# Patient Record
Sex: FEMALE | Race: WHITE | Employment: UNEMPLOYED | URBAN - NONMETROPOLITAN AREA
[De-identification: names, ages, dates, MRNs, and addresses within clinical notes are randomized per-mention and may not be internally consistent; named-entity substitution may affect disease eponyms.]

---

## 2017-07-24 ENCOUNTER — OFFICE VISIT (OUTPATIENT)
Dept: OPTOMETRY | Age: 72
End: 2017-07-24
Payer: MEDICARE

## 2017-07-24 DIAGNOSIS — H52.4 MYOPIA OF BOTH EYES WITH ASTIGMATISM AND PRESBYOPIA: ICD-10-CM

## 2017-07-24 DIAGNOSIS — H52.203 MYOPIA OF BOTH EYES WITH ASTIGMATISM AND PRESBYOPIA: ICD-10-CM

## 2017-07-24 DIAGNOSIS — H53.8 BLURRED VISION, BILATERAL: Primary | ICD-10-CM

## 2017-07-24 DIAGNOSIS — H52.13 MYOPIA OF BOTH EYES WITH ASTIGMATISM AND PRESBYOPIA: ICD-10-CM

## 2017-07-24 DIAGNOSIS — H26.9 CATARACT: ICD-10-CM

## 2017-07-24 PROCEDURE — 1123F ACP DISCUSS/DSCN MKR DOCD: CPT | Performed by: OPTOMETRIST

## 2017-07-24 PROCEDURE — 1090F PRES/ABSN URINE INCON ASSESS: CPT | Performed by: OPTOMETRIST

## 2017-07-24 PROCEDURE — 3014F SCREEN MAMMO DOC REV: CPT | Performed by: OPTOMETRIST

## 2017-07-24 PROCEDURE — 1036F TOBACCO NON-USER: CPT | Performed by: OPTOMETRIST

## 2017-07-24 PROCEDURE — G8421 BMI NOT CALCULATED: HCPCS | Performed by: OPTOMETRIST

## 2017-07-24 PROCEDURE — 3017F COLORECTAL CA SCREEN DOC REV: CPT | Performed by: OPTOMETRIST

## 2017-07-24 PROCEDURE — 99203 OFFICE O/P NEW LOW 30 MIN: CPT | Performed by: OPTOMETRIST

## 2017-07-24 PROCEDURE — G8400 PT W/DXA NO RESULTS DOC: HCPCS | Performed by: OPTOMETRIST

## 2017-07-24 PROCEDURE — 4040F PNEUMOC VAC/ADMIN/RCVD: CPT | Performed by: OPTOMETRIST

## 2017-07-24 PROCEDURE — G8427 DOCREV CUR MEDS BY ELIG CLIN: HCPCS | Performed by: OPTOMETRIST

## 2017-07-24 RX ORDER — BENOXINATE HCL/FLUORESCEIN SOD 0.4%-0.25%
1 DROPS OPHTHALMIC (EYE) ONCE
Status: COMPLETED | OUTPATIENT
Start: 2017-07-24 | End: 2017-07-24

## 2017-07-24 RX ADMIN — Medication 1 DROP: at 15:12

## 2017-07-24 ASSESSMENT — VISUAL ACUITY
OD_SC: 20/20 OU
CORRECTION_TYPE: GLASSES
OS_CC: 20/25
METHOD: SNELLEN - LINEAR

## 2017-07-24 ASSESSMENT — REFRACTION_WEARINGRX
OS_CYLINDER: -0.25
SPECS_TYPE: SVL
OD_SPHERE: -3.50
OS_AXIS: 123
OD_AXIS: 125
OD_CYLINDER: -0.75
OS_SPHERE: -3.50

## 2017-07-24 ASSESSMENT — REFRACTION_MANIFEST
OD_SPHERE: -3.50
OD_CYLINDER: -1.00
OS_CYLINDER: -0.50
OS_AXIS: 175
OS_SPHERE: -3.50
OD_AXIS: 125

## 2017-07-24 ASSESSMENT — SLIT LAMP EXAM - LIDS
COMMENTS: NORMAL
COMMENTS: NORMAL

## 2017-07-24 ASSESSMENT — TONOMETRY
OS_IOP_MMHG: 16
OD_IOP_MMHG: 16
IOP_METHOD: APPLANATION W FLURESS DROP

## 2017-08-04 ENCOUNTER — HOSPITAL ENCOUNTER (OUTPATIENT)
Age: 72
Setting detail: SPECIMEN
Discharge: HOME | End: 2017-08-04
Payer: MEDICARE

## 2017-08-04 ENCOUNTER — OFFICE VISIT (OUTPATIENT)
Dept: OBGYN | Age: 72
End: 2017-08-04
Payer: MEDICARE

## 2017-08-04 VITALS
HEIGHT: 63 IN | DIASTOLIC BLOOD PRESSURE: 80 MMHG | BODY MASS INDEX: 34.91 KG/M2 | WEIGHT: 197 LBS | RESPIRATION RATE: 16 BRPM | HEART RATE: 80 BPM | SYSTOLIC BLOOD PRESSURE: 118 MMHG

## 2017-08-04 DIAGNOSIS — N81.6 RECTOCELE: ICD-10-CM

## 2017-08-04 DIAGNOSIS — Z01.419 WELL WOMAN EXAM: ICD-10-CM

## 2017-08-04 DIAGNOSIS — N81.10 PELVIC ORGAN PROLAPSE QUANTIFICATION STAGE 3 CYSTOCELE: ICD-10-CM

## 2017-08-04 DIAGNOSIS — R35.0 URINARY FREQUENCY: Primary | ICD-10-CM

## 2017-08-04 LAB
-: ABNORMAL
AMORPHOUS: ABNORMAL
BACTERIA: ABNORMAL
BILIRUBIN URINE: NEGATIVE
CASTS UA: ABNORMAL /LPF (ref 0–2)
COLOR: ABNORMAL
COMMENT UA: ABNORMAL
CRYSTALS, UA: ABNORMAL /HPF
EPITHELIAL CELLS UA: ABNORMAL /HPF (ref 0–5)
GLUCOSE URINE: NEGATIVE
KETONES, URINE: NEGATIVE
LEUKOCYTE ESTERASE, URINE: ABNORMAL
MUCUS: ABNORMAL
NITRITE, URINE: NEGATIVE
OTHER OBSERVATIONS UA: ABNORMAL
PH UA: 5.5 (ref 5–6)
PROTEIN UA: NEGATIVE
RBC UA: ABNORMAL /HPF (ref 0–4)
RENAL EPITHELIAL, UA: ABNORMAL /HPF
SPECIFIC GRAVITY UA: 1.02 (ref 1.01–1.02)
TRICHOMONAS: ABNORMAL
TURBIDITY: ABNORMAL
URINE HGB: ABNORMAL
UROBILINOGEN, URINE: NORMAL
WBC UA: ABNORMAL /HPF (ref 0–4)
YEAST: ABNORMAL

## 2017-08-04 PROCEDURE — G8427 DOCREV CUR MEDS BY ELIG CLIN: HCPCS | Performed by: OBSTETRICS & GYNECOLOGY

## 2017-08-04 PROCEDURE — 81001 URINALYSIS AUTO W/SCOPE: CPT | Performed by: OBSTETRICS & GYNECOLOGY

## 2017-08-04 PROCEDURE — 3014F SCREEN MAMMO DOC REV: CPT | Performed by: OBSTETRICS & GYNECOLOGY

## 2017-08-04 PROCEDURE — 99203 OFFICE O/P NEW LOW 30 MIN: CPT | Performed by: OBSTETRICS & GYNECOLOGY

## 2017-08-04 PROCEDURE — 1090F PRES/ABSN URINE INCON ASSESS: CPT | Performed by: OBSTETRICS & GYNECOLOGY

## 2017-08-04 PROCEDURE — 87624 HPV HI-RISK TYP POOLED RSLT: CPT | Performed by: OBSTETRICS & GYNECOLOGY

## 2017-08-04 PROCEDURE — 3017F COLORECTAL CA SCREEN DOC REV: CPT | Performed by: OBSTETRICS & GYNECOLOGY

## 2017-08-04 PROCEDURE — 4040F PNEUMOC VAC/ADMIN/RCVD: CPT | Performed by: OBSTETRICS & GYNECOLOGY

## 2017-08-04 PROCEDURE — G8400 PT W/DXA NO RESULTS DOC: HCPCS | Performed by: OBSTETRICS & GYNECOLOGY

## 2017-08-04 PROCEDURE — G8417 CALC BMI ABV UP PARAM F/U: HCPCS | Performed by: OBSTETRICS & GYNECOLOGY

## 2017-08-04 PROCEDURE — 1123F ACP DISCUSS/DSCN MKR DOCD: CPT | Performed by: OBSTETRICS & GYNECOLOGY

## 2017-08-04 PROCEDURE — 1036F TOBACCO NON-USER: CPT | Performed by: OBSTETRICS & GYNECOLOGY

## 2017-08-05 LAB
CULTURE: NORMAL
CULTURE: NORMAL
Lab: NORMAL
SPECIMEN DESCRIPTION: NORMAL
STATUS: NORMAL

## 2017-08-07 LAB — CYTOLOGY REPORT: NORMAL

## 2017-08-08 DIAGNOSIS — Z01.419 ROUTINE GYNECOLOGICAL EXAMINATION: Primary | ICD-10-CM

## 2017-08-08 PROCEDURE — 87624 HPV HI-RISK TYP POOLED RSLT: CPT | Performed by: OBSTETRICS & GYNECOLOGY

## 2017-08-09 LAB
HPV SAMPLE: NORMAL
HPV SOURCE: NORMAL
HPV, GENOTYPE 16: NOT DETECTED
HPV, GENOTYPE 18: NOT DETECTED
HPV, HIGH RISK OTHER: NOT DETECTED
HPV, INTERPRETATION: NORMAL

## 2017-10-18 ENCOUNTER — HOSPITAL ENCOUNTER (OUTPATIENT)
Dept: SLEEP CENTER | Age: 72
Discharge: HOME | End: 2017-10-18
Payer: MEDICARE

## 2017-10-18 PROCEDURE — G0399 HOME SLEEP TEST/TYPE 3 PORTA: HCPCS

## 2017-10-27 NOTE — PROGRESS NOTES
Michelle [address]                [address] [place] [address]                                 SLEEP STUDY    PATIENT NAME: Migel Jj                       :             1945  MED REC NO:   [id number]                              ROOM:  ACCOUNT NO:   [de-identified]                            ADMISSION DATE:  10/18/2017  PROVIDER:     Bogdan Peres    SLEEP IMPROVEMENT [place]  INTERPRETATION OF CLINICAL POLYSOMNOGRAPHY    DATE OF STUDY:  10/18/2017    REFERRING PROVIDER:  Dr. Magui Salvador. CLINICAL IMPRESSION:  1. Obstructive sleep apnea syndrome. 2.  Diabetes. PROCEDURE STANDARD:  1-night ApneaLink home sleep study. PROCEDURE:  The sleep/wake evaluation consisted of an intensive intake  interview, 1 night of home sleep testing. The standard montage for Home Sleep Testing included the [address]. The respiratory battery consisted of measurements of  simultaneous recording of ventilation (oronasal thermal airflow),  respiratory effort (single thoracoabdominal piezo belt), ECG or heart  rate, oxygen saturation (finger oximetry). The sleep study was started at 11:30 p.m. and was finished at 6:35  a.m. The total time duration spent in bed was 7 hours and 28 minutes. Total evaluation was 7 hours and 16 minutes. The respiratory evaluation revealed apnea-hypopnea index of 2. The  lowest oxygen saturation was 84%. IMPRESSION:  Sleep study did not reveal any significant sleep apnea. A clinical correlation should be obtained. If the patient has  pathological snoring, an ENT evaluation may be requested for the  treatment of snoring. The patient is overweight. She should be encouraged to lose weight. If the patient's history is highly suggestive of sleep apnea, then  in-lab observed study should be ordered for further evaluation of the  sleep apnea.         Alejandro Palacios    D: 10/26/2017 14:11:43       T: 10/26/2017 16:09:30 [email or web address]  Job#: [id number]     Doc#: [id number]

## 2018-05-04 ENCOUNTER — OFFICE VISIT (OUTPATIENT)
Dept: PRIMARY CARE CLINIC | Age: 73
End: 2018-05-04

## 2018-05-04 VITALS
BODY MASS INDEX: 35.44 KG/M2 | HEART RATE: 70 BPM | SYSTOLIC BLOOD PRESSURE: 128 MMHG | TEMPERATURE: 97.6 F | DIASTOLIC BLOOD PRESSURE: 74 MMHG | OXYGEN SATURATION: 98 % | WEIGHT: 200 LBS | HEIGHT: 63 IN

## 2018-05-04 DIAGNOSIS — S81.831A PUNCTURE WOUND OF RIGHT LOWER LEG, INITIAL ENCOUNTER: ICD-10-CM

## 2018-05-04 DIAGNOSIS — L03.115 CELLULITIS OF RIGHT ANTERIOR LOWER LEG: Primary | ICD-10-CM

## 2018-05-04 PROCEDURE — 90714 TD VACC NO PRESV 7 YRS+ IM: CPT | Performed by: NURSE PRACTITIONER

## 2018-05-04 PROCEDURE — 99202 OFFICE O/P NEW SF 15 MIN: CPT | Performed by: NURSE PRACTITIONER

## 2018-05-04 PROCEDURE — 90471 IMMUNIZATION ADMIN: CPT | Performed by: NURSE PRACTITIONER

## 2018-05-04 RX ORDER — CEPHALEXIN 500 MG/1
500 CAPSULE ORAL 3 TIMES DAILY
Qty: 30 CAPSULE | Refills: 0 | Status: SHIPPED | OUTPATIENT
Start: 2018-05-04 | End: 2018-11-28

## 2018-05-04 ASSESSMENT — ENCOUNTER SYMPTOMS: RESPIRATORY NEGATIVE: 1

## 2018-09-24 ENCOUNTER — APPOINTMENT (OUTPATIENT)
Dept: GENERAL RADIOLOGY | Age: 73
End: 2018-09-24
Payer: MEDICARE

## 2018-09-24 ENCOUNTER — APPOINTMENT (OUTPATIENT)
Dept: CT IMAGING | Age: 73
End: 2018-09-24
Payer: MEDICARE

## 2018-09-24 ENCOUNTER — HOSPITAL ENCOUNTER (EMERGENCY)
Age: 73
Discharge: HOME | End: 2018-09-24
Attending: SPECIALIST
Payer: MEDICARE

## 2018-09-24 VITALS
BODY MASS INDEX: 33.13 KG/M2 | OXYGEN SATURATION: 100 % | SYSTOLIC BLOOD PRESSURE: 138 MMHG | TEMPERATURE: 98 F | DIASTOLIC BLOOD PRESSURE: 76 MMHG | HEIGHT: 63 IN | RESPIRATION RATE: 14 BRPM | HEART RATE: 66 BPM | WEIGHT: 187 LBS

## 2018-09-24 DIAGNOSIS — R42 DIZZINESS: Primary | ICD-10-CM

## 2018-09-24 DIAGNOSIS — R20.2 PARESTHESIA: ICD-10-CM

## 2018-09-24 LAB
ABSOLUTE EOS #: 0 K/UL (ref 0–0.4)
ABSOLUTE IMMATURE GRANULOCYTE: NORMAL K/UL (ref 0–0.3)
ABSOLUTE LYMPH #: 1.6 K/UL (ref 1–4.8)
ABSOLUTE MONO #: 0.5 K/UL (ref 0.1–1.2)
ANION GAP SERPL CALCULATED.3IONS-SCNC: 12 MMOL/L (ref 9–17)
BASOPHILS # BLD: 1 % (ref 0–1)
BASOPHILS ABSOLUTE: 0 K/UL (ref 0–0.2)
BUN BLDV-MCNC: 15 MG/DL (ref 8–23)
BUN/CREAT BLD: 19 (ref 9–20)
CALCIUM SERPL-MCNC: 9.3 MG/DL (ref 8.6–10.4)
CHLORIDE BLD-SCNC: 100 MMOL/L (ref 98–107)
CO2: 26 MMOL/L (ref 20–31)
CREAT SERPL-MCNC: 0.78 MG/DL (ref 0.5–0.9)
DIFFERENTIAL TYPE: NORMAL
EKG ATRIAL RATE: 61 BPM
EKG P AXIS: 32 DEGREES
EKG P-R INTERVAL: 182 MS
EKG Q-T INTERVAL: 432 MS
EKG QRS DURATION: 80 MS
EKG QTC CALCULATION (BAZETT): 434 MS
EKG R AXIS: 9 DEGREES
EKG T AXIS: 19 DEGREES
EKG VENTRICULAR RATE: 61 BPM
EOSINOPHILS RELATIVE PERCENT: 1 % (ref 1–7)
GFR AFRICAN AMERICAN: >60 ML/MIN
GFR NON-AFRICAN AMERICAN: >60 ML/MIN
GFR SERPL CREATININE-BSD FRML MDRD: NORMAL ML/MIN/{1.73_M2}
GFR SERPL CREATININE-BSD FRML MDRD: NORMAL ML/MIN/{1.73_M2}
GLUCOSE BLD-MCNC: 89 MG/DL (ref 70–99)
HCT VFR BLD CALC: 39.6 % (ref 36–46)
HEMOGLOBIN: 13.5 G/DL (ref 12–16)
IMMATURE GRANULOCYTES: NORMAL %
LYMPHOCYTES # BLD: 27 % (ref 16–46)
MAGNESIUM: 2.6 MG/DL (ref 1.6–2.6)
MCH RBC QN AUTO: 30.2 PG (ref 26–34)
MCHC RBC AUTO-ENTMCNC: 34 G/DL (ref 31–37)
MCV RBC AUTO: 88.8 FL (ref 80–100)
MONOCYTES # BLD: 9 % (ref 4–11)
NRBC AUTOMATED: NORMAL PER 100 WBC
PDW BLD-RTO: 14.1 % (ref 11–14.5)
PLATELET # BLD: 361 K/UL (ref 140–450)
PLATELET ESTIMATE: NORMAL
PMV BLD AUTO: 7.3 FL (ref 6–12)
POTASSIUM SERPL-SCNC: 4.2 MMOL/L (ref 3.7–5.3)
RBC # BLD: 4.46 M/UL (ref 4–5.2)
RBC # BLD: NORMAL 10*6/UL
SEG NEUTROPHILS: 62 % (ref 43–77)
SEGMENTED NEUTROPHILS ABSOLUTE COUNT: 3.6 K/UL (ref 1.8–7.7)
SODIUM BLD-SCNC: 138 MMOL/L (ref 135–144)
TROPONIN INTERP: NORMAL
TROPONIN T: <0.03 NG/ML
WBC # BLD: 5.7 K/UL (ref 3.5–11)
WBC # BLD: NORMAL 10*3/UL

## 2018-09-24 PROCEDURE — 70450 CT HEAD/BRAIN W/O DYE: CPT

## 2018-09-24 PROCEDURE — 99284 EMERGENCY DEPT VISIT MOD MDM: CPT

## 2018-09-24 PROCEDURE — 71046 X-RAY EXAM CHEST 2 VIEWS: CPT

## 2018-09-24 PROCEDURE — 36415 COLL VENOUS BLD VENIPUNCTURE: CPT

## 2018-09-24 PROCEDURE — 80048 BASIC METABOLIC PNL TOTAL CA: CPT

## 2018-09-24 PROCEDURE — 93005 ELECTROCARDIOGRAM TRACING: CPT

## 2018-09-24 PROCEDURE — 85025 COMPLETE CBC W/AUTO DIFF WBC: CPT

## 2018-09-24 PROCEDURE — 83735 ASSAY OF MAGNESIUM: CPT

## 2018-09-24 PROCEDURE — 84484 ASSAY OF TROPONIN QUANT: CPT

## 2018-09-24 NOTE — ED PROVIDER NOTES
Normal range of motion. Neck supple. Carotid bruit is not present. Cardiovascular: Normal rate, regular rhythm, normal heart sounds and intact distal pulses. No murmur heard. Pulmonary/Chest: Effort normal and breath sounds normal. No respiratory distress. Abdominal: Soft. Bowel sounds are normal. She exhibits no distension. There is no tenderness. Neurological: She is alert and oriented to person, place, and time. She has normal strength and normal reflexes. No cranial nerve deficit or sensory deficit. She displays a negative Romberg sign. GCS eye subscore is 4. GCS verbal subscore is 5. GCS motor subscore is 6. Skin: Skin is warm and dry. Nursing note and vitals reviewed. DIFFERENTIAL DIAGNOSIS/ MDM:     Transient ischemic attack, cerebrovascular accident, electrolyte imbalance, cardiac arrhythmia    INITIAL NIH STROKE SCALE    Administer stroke scale items in the order listed. Record performance in each category after each subscale exam. Do not go back and change scores. Follow directions provided for each exam technique. Scores should reflect what the patient does, not what the clinician thinks the patient can do. The clinician should record answers while administering the exam and work quickly. Except where indicated, the patient should not be coached (i.e., repeated requests to patient to make a special effort). 1a.  Level of consciousness:  0 - alert; keenly responsive  1b. Level of consciousness questions:  0 - answers both questions correctly  1c. Level of consciousness questions:  0 - performs both tasks correctly  2. Best Gaze:  0 - normal  3. Visual:  0 - no visual loss  4. Facial Palsy:  0 - normal symmetric movement  5a. Motor left arm:  0 - no drift, limb holds 90 (or 45) degrees for full 10 seconds  5b. Motor right arm:  0 - no drift, limb holds 90 (or 45) degrees for full 10 seconds  6a.   Motor left le - no drift; leg holds 30 degree position for full 5 seconds  6b. Motor right le - no drift; leg holds 30 degree position for full 5 seconds  7. Limb Ataxia:  0 - absent  8. Sensory:  0 - normal; no sensory loss  9. Best Language:  0 - no aphasia, normal  10. Dysarthria:  0 - normal  11. Extinction and Inattention:  0 - no abnormality    TOTAL:  0    DIAGNOSTIC RESULTS     EKG: All EKG's are interpreted by the Emergency Department Physician who either signs or Co-signs this chart in the absence of a cardiologist.    EKG Interpretation    Interpreted by me    Rhythm: normal sinus   Rate: normal  Axis: normal  Ectopy: none  Conduction: normal  ST Segments: no acute change  T Waves: no acute change  Q Waves: none    Clinical Impression: no acute changes and normal EKG    RADIOLOGY:   I directly visualized the following  images and reviewed the radiologist interpretations:  CT Head WO Contrast   Final Result   1. No acute intracranial abnormality. XR CHEST STANDARD (2 VW)   Final Result   No acute pulmonary process. Xr Chest Standard (2 Vw)    Result Date: 2018  EXAMINATION: TWO VIEWS OF THE CHEST 2018 1:27 pm COMPARISON: None. HISTORY: ORDERING SYSTEM PROVIDED HISTORY: Dizzy, wobbly, paresthesia right arm TECHNOLOGIST PROVIDED HISTORY: Dizzy, wobbly, paresthesia right arm Ordering Physician Provided Reason for Exam: dizziness; headache; right arm numbness FINDINGS: Lungs: Clear Mediastinum: Unremarkable Pleura: No pleural effusion or pneumothorax Other: Unremarkable. No acute pulmonary process. Ct Head Wo Contrast    Result Date: 2018  EXAMINATION: CT OF THE HEAD WITHOUT CONTRAST  2018 1:29 pm TECHNIQUE: CT of the head was performed without the administration of intravenous contrast. Dose modulation, iterative reconstruction, and/or weight based adjustment of the mA/kV was utilized to reduce the radiation dose to as low as reasonably achievable. COMPARISON: None.  HISTORY: ORDERING SYSTEM PROVIDED HISTORY: Dizzy, wobbly, paresthesia right arm TECHNOLOGIST PROVIDED HISTORY: Ordering Physician Provided Reason for Exam: Headache; dizziness; right arm numbness FINDINGS: BRAIN/VENTRICLES: There is no acute intracranial hemorrhage, mass effect or midline shift. No abnormal extra-axial fluid collection. The gray-white differentiation is maintained without evidence of an acute infarct. There is no evidence of hydrocephalus. ORBITS: The visualized portion of the orbits demonstrate no acute abnormality. SINUSES: The visualized paranasal sinuses and mastoid air cells demonstrate no acute abnormality. SOFT TISSUES/SKULL:  No acute abnormality of the visualized skull or soft tissues. Note is made of lucency involving the right frontal calvarium likely representing a vascular lake. 1.No acute intracranial abnormality. LABS:  Labs Reviewed   BASIC METABOLIC PANEL   MAGNESIUM   TROPONIN   CBC WITH AUTO DIFFERENTIAL       I reviewed all the lab results and these are within normal range. EMERGENCY DEPARTMENT COURSE:   Vitals:    Vitals:    09/24/18 1210 09/24/18 1316 09/24/18 1358   BP: (!) 152/85 134/65 138/76   Pulse: 74 69 66   Resp: 14 14 14   Temp: 98 °F (36.7 °C)     TempSrc: Tympanic     SpO2: 98% 99% 100%   Weight: 84.8 kg (187 lb)     Height: 5' 3\" (1.6 m)       -------------------------  BP: 138/76, Temp: 98 °F (36.7 °C), Pulse: 66, Resp: 14    No orders of the defined types were placed in this encounter. During emergency department course, patient is resting comfortably and does not appear to be in any pain or distress. She was put on the monitor which revealed normal sinus rhythm. She remained hemodynamically stable and neurologically intact throughout emergency department course. She prefers to go home.   Plan is to discharge the patient with instructions to continue current medications including aspirin once daily, follow-up with primary care physician for further evaluation as an outpatient,

## 2018-11-28 ENCOUNTER — OFFICE VISIT (OUTPATIENT)
Dept: FAMILY MEDICINE CLINIC | Age: 73
End: 2018-11-28
Payer: MEDICARE

## 2018-11-28 VITALS
SYSTOLIC BLOOD PRESSURE: 128 MMHG | DIASTOLIC BLOOD PRESSURE: 72 MMHG | WEIGHT: 192.4 LBS | BODY MASS INDEX: 34.09 KG/M2 | HEIGHT: 63 IN | HEART RATE: 82 BPM | RESPIRATION RATE: 16 BRPM

## 2018-11-28 DIAGNOSIS — E66.9 CLASS 1 OBESITY WITHOUT SERIOUS COMORBIDITY WITH BODY MASS INDEX (BMI) OF 34.0 TO 34.9 IN ADULT, UNSPECIFIED OBESITY TYPE: ICD-10-CM

## 2018-11-28 DIAGNOSIS — Z23 NEED FOR TDAP VACCINATION: ICD-10-CM

## 2018-11-28 DIAGNOSIS — Z11.59 ENCOUNTER FOR HEPATITIS C SCREENING TEST FOR LOW RISK PATIENT: ICD-10-CM

## 2018-11-28 DIAGNOSIS — Z78.0 POSTMENOPAUSAL: ICD-10-CM

## 2018-11-28 DIAGNOSIS — Z23 NEED FOR SHINGLES VACCINE: ICD-10-CM

## 2018-11-28 DIAGNOSIS — R53.83 FATIGUE, UNSPECIFIED TYPE: Primary | ICD-10-CM

## 2018-11-28 DIAGNOSIS — Z23 NEED FOR INFLUENZA VACCINATION: ICD-10-CM

## 2018-11-28 DIAGNOSIS — Z12.31 ENCOUNTER FOR SCREENING MAMMOGRAM FOR BREAST CANCER: ICD-10-CM

## 2018-11-28 DIAGNOSIS — N83.201 CYST OF RIGHT OVARY: ICD-10-CM

## 2018-11-28 DIAGNOSIS — R73.09 ELEVATED GLUCOSE: ICD-10-CM

## 2018-11-28 PROBLEM — E03.9 HYPOTHYROIDISM: Status: ACTIVE | Noted: 2017-09-11

## 2018-11-28 PROBLEM — R73.9 HYPERGLYCEMIA: Status: ACTIVE | Noted: 2018-11-28

## 2018-11-28 PROBLEM — E55.9 VITAMIN D DEFICIENCY: Status: ACTIVE | Noted: 2018-11-28

## 2018-11-28 PROCEDURE — G8417 CALC BMI ABV UP PARAM F/U: HCPCS | Performed by: FAMILY MEDICINE

## 2018-11-28 PROCEDURE — 99214 OFFICE O/P EST MOD 30 MIN: CPT | Performed by: FAMILY MEDICINE

## 2018-11-28 PROCEDURE — 3017F COLORECTAL CA SCREEN DOC REV: CPT | Performed by: FAMILY MEDICINE

## 2018-11-28 PROCEDURE — 1123F ACP DISCUSS/DSCN MKR DOCD: CPT | Performed by: FAMILY MEDICINE

## 2018-11-28 PROCEDURE — G0008 ADMIN INFLUENZA VIRUS VAC: HCPCS | Performed by: FAMILY MEDICINE

## 2018-11-28 PROCEDURE — 1090F PRES/ABSN URINE INCON ASSESS: CPT | Performed by: FAMILY MEDICINE

## 2018-11-28 PROCEDURE — 90662 IIV NO PRSV INCREASED AG IM: CPT | Performed by: FAMILY MEDICINE

## 2018-11-28 PROCEDURE — G8482 FLU IMMUNIZE ORDER/ADMIN: HCPCS | Performed by: FAMILY MEDICINE

## 2018-11-28 PROCEDURE — 1036F TOBACCO NON-USER: CPT | Performed by: FAMILY MEDICINE

## 2018-11-28 PROCEDURE — 4040F PNEUMOC VAC/ADMIN/RCVD: CPT | Performed by: FAMILY MEDICINE

## 2018-11-28 PROCEDURE — 1101F PT FALLS ASSESS-DOCD LE1/YR: CPT | Performed by: FAMILY MEDICINE

## 2018-11-28 PROCEDURE — G8400 PT W/DXA NO RESULTS DOC: HCPCS | Performed by: FAMILY MEDICINE

## 2018-11-28 PROCEDURE — G8427 DOCREV CUR MEDS BY ELIG CLIN: HCPCS | Performed by: FAMILY MEDICINE

## 2018-11-28 RX ORDER — CITALOPRAM 10 MG/1
10 TABLET ORAL DAILY
Qty: 30 TABLET | Refills: 2 | Status: SHIPPED | OUTPATIENT
Start: 2018-11-28 | End: 2019-01-29

## 2018-11-28 ASSESSMENT — PATIENT HEALTH QUESTIONNAIRE - PHQ9
SUM OF ALL RESPONSES TO PHQ QUESTIONS 1-9: 2
1. LITTLE INTEREST OR PLEASURE IN DOING THINGS: 1
SUM OF ALL RESPONSES TO PHQ9 QUESTIONS 1 & 2: 2
SUM OF ALL RESPONSES TO PHQ QUESTIONS 1-9: 2
2. FEELING DOWN, DEPRESSED OR HOPELESS: 1

## 2018-11-28 NOTE — PROGRESS NOTES
Have you had an allergic reaction to the flu (influenza) shot? No  Are you allergic to eggs or any component of the flu vaccine? No  Do you have a history of Guillain-Waldo Syndrome (GBS), which is paralysis after receiving the flu vaccine? no  Are you feeling well today? Yes  Flu vaccine given as ordered. Patient tolerated it well. No questions re: VIS information.

## 2018-12-03 ENCOUNTER — HOSPITAL ENCOUNTER (OUTPATIENT)
Dept: LAB | Age: 73
Discharge: HOME | End: 2018-12-03
Payer: MEDICARE

## 2018-12-03 DIAGNOSIS — E66.9 CLASS 1 OBESITY WITHOUT SERIOUS COMORBIDITY WITH BODY MASS INDEX (BMI) OF 34.0 TO 34.9 IN ADULT, UNSPECIFIED OBESITY TYPE: ICD-10-CM

## 2018-12-03 DIAGNOSIS — R53.83 FATIGUE, UNSPECIFIED TYPE: ICD-10-CM

## 2018-12-03 DIAGNOSIS — R73.09 ELEVATED GLUCOSE: ICD-10-CM

## 2018-12-03 DIAGNOSIS — Z11.59 ENCOUNTER FOR HEPATITIS C SCREENING TEST FOR LOW RISK PATIENT: ICD-10-CM

## 2018-12-03 LAB
ABSOLUTE EOS #: 0.1 K/UL (ref 0–0.4)
ABSOLUTE IMMATURE GRANULOCYTE: NORMAL K/UL (ref 0–0.3)
ABSOLUTE LYMPH #: 1.6 K/UL (ref 1–4.8)
ABSOLUTE MONO #: 0.4 K/UL (ref 0.1–1.2)
ALBUMIN SERPL-MCNC: 4 G/DL (ref 3.5–5.2)
ALBUMIN/GLOBULIN RATIO: 1.1 (ref 1–2.5)
ALP BLD-CCNC: 72 U/L (ref 35–104)
ALT SERPL-CCNC: 20 U/L (ref 5–33)
ANION GAP SERPL CALCULATED.3IONS-SCNC: 11 MMOL/L (ref 9–17)
AST SERPL-CCNC: 23 U/L
BASOPHILS # BLD: 1 % (ref 0–1)
BASOPHILS ABSOLUTE: 0 K/UL (ref 0–0.2)
BILIRUB SERPL-MCNC: 0.3 MG/DL (ref 0.3–1.2)
BUN BLDV-MCNC: 13 MG/DL (ref 8–23)
BUN/CREAT BLD: 20 (ref 9–20)
CALCIUM SERPL-MCNC: 9.4 MG/DL (ref 8.6–10.4)
CHLORIDE BLD-SCNC: 103 MMOL/L (ref 98–107)
CHOLESTEROL, FASTING: 92 MG/DL
CHOLESTEROL/HDL RATIO: 2.2
CO2: 25 MMOL/L (ref 20–31)
CREAT SERPL-MCNC: 0.66 MG/DL (ref 0.5–0.9)
DIFFERENTIAL TYPE: NORMAL
EOSINOPHILS RELATIVE PERCENT: 1 % (ref 1–7)
ESTIMATED AVERAGE GLUCOSE: 114 MG/DL
GFR AFRICAN AMERICAN: >60 ML/MIN
GFR NON-AFRICAN AMERICAN: >60 ML/MIN
GFR SERPL CREATININE-BSD FRML MDRD: NORMAL ML/MIN/{1.73_M2}
GFR SERPL CREATININE-BSD FRML MDRD: NORMAL ML/MIN/{1.73_M2}
GLUCOSE BLD-MCNC: 87 MG/DL (ref 70–99)
HBA1C MFR BLD: 5.6 % (ref 4.8–5.9)
HCT VFR BLD CALC: 40.5 % (ref 36–46)
HDLC SERPL-MCNC: 42 MG/DL
HEMOGLOBIN: 13.2 G/DL (ref 12–16)
HEPATITIS C ANTIBODY: NONREACTIVE
IMMATURE GRANULOCYTES: NORMAL %
LDL CHOLESTEROL: 38 MG/DL (ref 0–130)
LYMPHOCYTES # BLD: 28 % (ref 16–46)
MCH RBC QN AUTO: 28.9 PG (ref 26–34)
MCHC RBC AUTO-ENTMCNC: 32.6 G/DL (ref 31–37)
MCV RBC AUTO: 88.6 FL (ref 80–100)
MONOCYTES # BLD: 8 % (ref 4–11)
NRBC AUTOMATED: NORMAL PER 100 WBC
PDW BLD-RTO: 14 % (ref 11–14.5)
PLATELET # BLD: 335 K/UL (ref 140–450)
PLATELET ESTIMATE: NORMAL
PMV BLD AUTO: 7.5 FL (ref 6–12)
POTASSIUM SERPL-SCNC: 4 MMOL/L (ref 3.7–5.3)
RBC # BLD: 4.57 M/UL (ref 4–5.2)
RBC # BLD: NORMAL 10*6/UL
SEG NEUTROPHILS: 62 % (ref 43–77)
SEGMENTED NEUTROPHILS ABSOLUTE COUNT: 3.4 K/UL (ref 1.8–7.7)
SODIUM BLD-SCNC: 139 MMOL/L (ref 135–144)
THYROXINE, FREE: 1.28 NG/DL (ref 0.93–1.7)
TOTAL PROTEIN: 7.6 G/DL (ref 6.4–8.3)
TRIGLYCERIDE, FASTING: 60 MG/DL
TSH SERPL DL<=0.05 MIU/L-ACNC: 2.93 MIU/L (ref 0.3–5)
VLDLC SERPL CALC-MCNC: NORMAL MG/DL (ref 1–30)
WBC # BLD: 5.5 K/UL (ref 3.5–11)
WBC # BLD: NORMAL 10*3/UL

## 2018-12-03 PROCEDURE — 80061 LIPID PANEL: CPT

## 2018-12-03 PROCEDURE — 36415 COLL VENOUS BLD VENIPUNCTURE: CPT

## 2018-12-03 PROCEDURE — 85025 COMPLETE CBC W/AUTO DIFF WBC: CPT

## 2018-12-03 PROCEDURE — 86803 HEPATITIS C AB TEST: CPT

## 2018-12-03 PROCEDURE — 84439 ASSAY OF FREE THYROXINE: CPT

## 2018-12-03 PROCEDURE — 84443 ASSAY THYROID STIM HORMONE: CPT

## 2018-12-03 PROCEDURE — 80053 COMPREHEN METABOLIC PANEL: CPT

## 2018-12-03 PROCEDURE — 83036 HEMOGLOBIN GLYCOSYLATED A1C: CPT

## 2018-12-10 DIAGNOSIS — Z78.0 POSTMENOPAUSAL: Primary | ICD-10-CM

## 2019-01-08 ENCOUNTER — HOSPITAL ENCOUNTER (OUTPATIENT)
Dept: MAMMOGRAPHY | Age: 74
Discharge: HOME | End: 2019-01-10
Payer: MEDICARE

## 2019-01-08 ENCOUNTER — HOSPITAL ENCOUNTER (OUTPATIENT)
Dept: BONE DENSITY | Age: 74
Discharge: HOME | End: 2019-01-10
Payer: MEDICARE

## 2019-01-08 DIAGNOSIS — Z12.31 ENCOUNTER FOR SCREENING MAMMOGRAM FOR BREAST CANCER: ICD-10-CM

## 2019-01-08 DIAGNOSIS — Z78.0 POSTMENOPAUSAL: ICD-10-CM

## 2019-01-08 PROCEDURE — 77085 DXA BONE DENSITY AXL VRT FX: CPT

## 2019-01-08 PROCEDURE — 77063 BREAST TOMOSYNTHESIS BI: CPT

## 2019-01-09 DIAGNOSIS — E55.9 VITAMIN D DEFICIENCY: Primary | ICD-10-CM

## 2019-01-14 ENCOUNTER — HOSPITAL ENCOUNTER (OUTPATIENT)
Dept: LAB | Age: 74
Discharge: HOME | End: 2019-01-14
Payer: MEDICARE

## 2019-01-14 DIAGNOSIS — E55.9 VITAMIN D DEFICIENCY: ICD-10-CM

## 2019-01-14 LAB — VITAMIN D 25-HYDROXY: 47.9 NG/ML (ref 30–100)

## 2019-01-14 PROCEDURE — 82306 VITAMIN D 25 HYDROXY: CPT

## 2019-01-14 PROCEDURE — 36415 COLL VENOUS BLD VENIPUNCTURE: CPT

## 2019-01-25 ENCOUNTER — TELEPHONE (OUTPATIENT)
Dept: FAMILY MEDICINE CLINIC | Age: 74
End: 2019-01-25

## 2019-01-25 DIAGNOSIS — R92.8 ABNORMAL SCREENING MAMMOGRAM: ICD-10-CM

## 2019-01-25 DIAGNOSIS — Z12.31 SCREENING MAMMOGRAM, ENCOUNTER FOR: Primary | ICD-10-CM

## 2019-01-25 DIAGNOSIS — R92.8 ABNORMAL SCREENING MAMMOGRAM: Primary | ICD-10-CM

## 2019-01-28 ENCOUNTER — HOSPITAL ENCOUNTER (OUTPATIENT)
Dept: ULTRASOUND IMAGING | Age: 74
Discharge: HOME | End: 2019-01-30
Payer: MEDICARE

## 2019-01-28 ENCOUNTER — HOSPITAL ENCOUNTER (OUTPATIENT)
Dept: MAMMOGRAPHY | Age: 74
Discharge: HOME | End: 2019-01-30
Payer: MEDICARE

## 2019-01-28 DIAGNOSIS — N83.201 CYST OF RIGHT OVARY: ICD-10-CM

## 2019-01-28 DIAGNOSIS — R92.8 ABNORMAL SCREENING MAMMOGRAM: ICD-10-CM

## 2019-01-28 PROCEDURE — 77065 DX MAMMO INCL CAD UNI: CPT

## 2019-01-28 PROCEDURE — 76830 TRANSVAGINAL US NON-OB: CPT

## 2019-01-28 PROCEDURE — 76642 ULTRASOUND BREAST LIMITED: CPT

## 2019-01-29 ENCOUNTER — OFFICE VISIT (OUTPATIENT)
Dept: FAMILY MEDICINE CLINIC | Age: 74
End: 2019-01-29
Payer: MEDICARE

## 2019-01-29 VITALS
HEIGHT: 63 IN | BODY MASS INDEX: 34.41 KG/M2 | HEART RATE: 74 BPM | SYSTOLIC BLOOD PRESSURE: 120 MMHG | WEIGHT: 194.2 LBS | DIASTOLIC BLOOD PRESSURE: 82 MMHG | RESPIRATION RATE: 16 BRPM

## 2019-01-29 DIAGNOSIS — Z23 NEED FOR PNEUMOCOCCAL VACCINE: ICD-10-CM

## 2019-01-29 DIAGNOSIS — M85.852 OSTEOPENIA OF LEFT HIP: ICD-10-CM

## 2019-01-29 DIAGNOSIS — F32.A DEPRESSION, UNSPECIFIED DEPRESSION TYPE: Primary | ICD-10-CM

## 2019-01-29 PROCEDURE — G8427 DOCREV CUR MEDS BY ELIG CLIN: HCPCS | Performed by: FAMILY MEDICINE

## 2019-01-29 PROCEDURE — 1090F PRES/ABSN URINE INCON ASSESS: CPT | Performed by: FAMILY MEDICINE

## 2019-01-29 PROCEDURE — G8400 PT W/DXA NO RESULTS DOC: HCPCS | Performed by: FAMILY MEDICINE

## 2019-01-29 PROCEDURE — 90670 PCV13 VACCINE IM: CPT | Performed by: FAMILY MEDICINE

## 2019-01-29 PROCEDURE — 3017F COLORECTAL CA SCREEN DOC REV: CPT | Performed by: FAMILY MEDICINE

## 2019-01-29 PROCEDURE — 4040F PNEUMOC VAC/ADMIN/RCVD: CPT | Performed by: FAMILY MEDICINE

## 2019-01-29 PROCEDURE — G0009 ADMIN PNEUMOCOCCAL VACCINE: HCPCS | Performed by: FAMILY MEDICINE

## 2019-01-29 PROCEDURE — 1123F ACP DISCUSS/DSCN MKR DOCD: CPT | Performed by: FAMILY MEDICINE

## 2019-01-29 PROCEDURE — 1101F PT FALLS ASSESS-DOCD LE1/YR: CPT | Performed by: FAMILY MEDICINE

## 2019-01-29 PROCEDURE — G8482 FLU IMMUNIZE ORDER/ADMIN: HCPCS | Performed by: FAMILY MEDICINE

## 2019-01-29 PROCEDURE — 1036F TOBACCO NON-USER: CPT | Performed by: FAMILY MEDICINE

## 2019-01-29 PROCEDURE — 99213 OFFICE O/P EST LOW 20 MIN: CPT | Performed by: FAMILY MEDICINE

## 2019-01-29 PROCEDURE — G8417 CALC BMI ABV UP PARAM F/U: HCPCS | Performed by: FAMILY MEDICINE

## 2019-01-29 ASSESSMENT — PATIENT HEALTH QUESTIONNAIRE - PHQ9
2. FEELING DOWN, DEPRESSED OR HOPELESS: 1
SUM OF ALL RESPONSES TO PHQ QUESTIONS 1-9: 2
SUM OF ALL RESPONSES TO PHQ QUESTIONS 1-9: 2
1. LITTLE INTEREST OR PLEASURE IN DOING THINGS: 1
SUM OF ALL RESPONSES TO PHQ9 QUESTIONS 1 & 2: 2

## 2019-01-30 ENCOUNTER — TELEPHONE (OUTPATIENT)
Dept: FAMILY MEDICINE CLINIC | Age: 74
End: 2019-01-30

## 2019-01-31 PROBLEM — R13.10 DYSPHAGIA: Status: ACTIVE | Noted: 2019-01-31

## 2019-01-31 PROBLEM — M85.852 OSTEOPENIA OF LEFT HIP: Status: ACTIVE | Noted: 2019-01-31

## 2019-02-22 ENCOUNTER — OFFICE VISIT (OUTPATIENT)
Dept: FAMILY MEDICINE CLINIC | Age: 74
End: 2019-02-22

## 2019-02-22 VITALS
WEIGHT: 194.8 LBS | SYSTOLIC BLOOD PRESSURE: 128 MMHG | HEIGHT: 63 IN | DIASTOLIC BLOOD PRESSURE: 72 MMHG | BODY MASS INDEX: 34.52 KG/M2

## 2019-02-22 DIAGNOSIS — Z53.21 PATIENT LEFT WITHOUT BEING SEEN: Primary | ICD-10-CM

## 2019-02-22 PROCEDURE — 99999 PR OFFICE/OUTPT VISIT,PROCEDURE ONLY: CPT | Performed by: FAMILY MEDICINE

## 2019-02-22 RX ORDER — CITALOPRAM 10 MG/1
10 TABLET ORAL DAILY
COMMUNITY
Start: 2019-02-10 | End: 2019-05-15

## 2019-02-22 ASSESSMENT — PATIENT HEALTH QUESTIONNAIRE - PHQ9
SUM OF ALL RESPONSES TO PHQ QUESTIONS 1-9: 2
1. LITTLE INTEREST OR PLEASURE IN DOING THINGS: 1
SUM OF ALL RESPONSES TO PHQ QUESTIONS 1-9: 2
2. FEELING DOWN, DEPRESSED OR HOPELESS: 1
SUM OF ALL RESPONSES TO PHQ9 QUESTIONS 1 & 2: 2

## 2019-05-15 ENCOUNTER — OFFICE VISIT (OUTPATIENT)
Dept: FAMILY MEDICINE CLINIC | Age: 74
End: 2019-05-15
Payer: MEDICARE

## 2019-05-15 ENCOUNTER — HOSPITAL ENCOUNTER (OUTPATIENT)
Dept: GENERAL RADIOLOGY | Age: 74
Discharge: HOME | End: 2019-05-17
Payer: MEDICARE

## 2019-05-15 VITALS
BODY MASS INDEX: 34.41 KG/M2 | HEART RATE: 72 BPM | DIASTOLIC BLOOD PRESSURE: 62 MMHG | RESPIRATION RATE: 16 BRPM | HEIGHT: 63 IN | WEIGHT: 194.2 LBS | SYSTOLIC BLOOD PRESSURE: 112 MMHG

## 2019-05-15 DIAGNOSIS — F32.A DEPRESSION, UNSPECIFIED DEPRESSION TYPE: Primary | ICD-10-CM

## 2019-05-15 DIAGNOSIS — R73.09 ELEVATED GLUCOSE: ICD-10-CM

## 2019-05-15 DIAGNOSIS — M79.675 PAIN AND SWELLING OF TOE, LEFT: ICD-10-CM

## 2019-05-15 DIAGNOSIS — M79.89 PAIN AND SWELLING OF TOE, LEFT: ICD-10-CM

## 2019-05-15 DIAGNOSIS — N81.10 CYSTOCELE WITH RECTOCELE: ICD-10-CM

## 2019-05-15 DIAGNOSIS — N81.6 CYSTOCELE WITH RECTOCELE: ICD-10-CM

## 2019-05-15 PROCEDURE — G8400 PT W/DXA NO RESULTS DOC: HCPCS | Performed by: FAMILY MEDICINE

## 2019-05-15 PROCEDURE — 73630 X-RAY EXAM OF FOOT: CPT

## 2019-05-15 PROCEDURE — G8427 DOCREV CUR MEDS BY ELIG CLIN: HCPCS | Performed by: FAMILY MEDICINE

## 2019-05-15 PROCEDURE — 4040F PNEUMOC VAC/ADMIN/RCVD: CPT | Performed by: FAMILY MEDICINE

## 2019-05-15 PROCEDURE — 99214 OFFICE O/P EST MOD 30 MIN: CPT | Performed by: FAMILY MEDICINE

## 2019-05-15 PROCEDURE — 1123F ACP DISCUSS/DSCN MKR DOCD: CPT | Performed by: FAMILY MEDICINE

## 2019-05-15 PROCEDURE — G8417 CALC BMI ABV UP PARAM F/U: HCPCS | Performed by: FAMILY MEDICINE

## 2019-05-15 PROCEDURE — 3017F COLORECTAL CA SCREEN DOC REV: CPT | Performed by: FAMILY MEDICINE

## 2019-05-15 PROCEDURE — 1090F PRES/ABSN URINE INCON ASSESS: CPT | Performed by: FAMILY MEDICINE

## 2019-05-15 PROCEDURE — 1036F TOBACCO NON-USER: CPT | Performed by: FAMILY MEDICINE

## 2019-05-15 RX ORDER — FLUOXETINE 10 MG/1
10 CAPSULE ORAL DAILY
Qty: 30 CAPSULE | Refills: 1 | Status: SHIPPED | OUTPATIENT
Start: 2019-05-15 | End: 2019-06-19 | Stop reason: SDUPTHER

## 2019-05-15 ASSESSMENT — PATIENT HEALTH QUESTIONNAIRE - PHQ9
SUM OF ALL RESPONSES TO PHQ9 QUESTIONS 1 & 2: 2
SUM OF ALL RESPONSES TO PHQ QUESTIONS 1-9: 2
2. FEELING DOWN, DEPRESSED OR HOPELESS: 1
1. LITTLE INTEREST OR PLEASURE IN DOING THINGS: 1
SUM OF ALL RESPONSES TO PHQ QUESTIONS 1-9: 2

## 2019-05-15 NOTE — PROGRESS NOTES
[address], [address]                        Telephone [phone]             Fax [phone]     Romayne Metz  1945  MRN:  [id number]  Date of visit:  5/15/2019    Subjective:    Romayne Metz is a 72 y.o.  female who presents to [facility] today (5/15/2019) for follow up/evaluation of:  Depression (follow up visit-stopped taking Celexa-felt dizzy and was not sleeping well); Toe Pain (stubbed left 4th toe,painful and swollen,happened 3 weeks ago); and Other (discuss referral to OB/GYN for cystocele/rectocele,had seen Dr Nicole Morgan in [facility] last year)      She states that she stopped Celexa after her last office visit as it was causing her to feel dizzy, and she was not sleeping well. She still has symptoms of depression, and she would like to try a different medication. She also reports pain in the left 4th toe. She states that she injured to toe about 3 weeks ago. She continues to have pain and swelling in the toe. She also requests a referral to an OB-Gyn physician for treatment of a cystocele and rectocele. She has tried a pessary in the past without success. She was not interested in surgery in the past, but she is now ready to consider surgery. She has the following problem list:  Patient Active Problem List   Diagnosis    Vitamin D deficiency    Obesity (BMI 30-39. 9)    Hypothyroidism    Hyperglycemia    Osteopenia of left hip    Dysphagia        Current medications are:  Outpatient Medications Marked as Taking for the 5/15/19 encounter (Office Visit) with Karly Morris MD   Medication Sig Dispense Refill    Cholecalciferol (VITAMIN D3) 5000 UNITS TABS Take 5,000 Units by mouth daily      co-enzyme Q-10 50 MG capsule Take 50 mg by mouth daily      magnesium oxide (MAG-OX) 400 MG tablet Take 400 mg by mouth daily      Omega-3 300 MG CAPS - 2.5 Final    GFR Non- 12/03/2018 >60  >60 mL/min Final    GFR  12/03/2018 >60  >60 mL/min Final    GFR Comment 12/03/2018        Final    Comment: Average GFR for 79or more years old:   76 mL/min/1.73sq m  Chronic Kidney Disease:   <60 mL/min/1.73sq m  Kidney failure:   <15 mL/min/1.73sq m              eGFR calculated using average adult body mass. Additional eGFR calculator   available at:        [email or web address]            GFR Staging 12/03/2018 NOT REPORTED   Final    Hepatitis C Ab 12/03/2018 NONREACTIVE  NR Final    Comment:       The hepatitis C procedure used in our laboratory is a Chemiluminescent test   specific for three recombinant HCV antigens. A negative anti-HCV result   indicates that the antibodies to hepatitis C virus are not present at this   time. Individuals with reactive anti-HCV should be considered infected and infectious   until proven otherwise. Confirmation of all equivocal or reactive results is   recommended by ordering HCV RNA by PCR.  Cholesterol, Fasting 12/03/2018 92  <200 mg/dL Final    Comment:    Cholesterol Guidelines:      <200  Desirable   200-240  Borderline      >240  Undesirable         HDL 12/03/2018 42  >40 mg/dL Final    Comment:    HDL Guidelines:    <40     Undesirable   40-59    Borderline    >59     Desirable         LDL Cholesterol 12/03/2018 38  0 - 130 mg/dL Final    Comment:    LDL Guidelines:     <100    Desirable   100-129   Near to/above Desirable   130-159   Borderline      >159   Undesirable     Direct (measured) LDL and calculated LDL are not interchangeable tests.  Chol/HDL Ratio 12/03/2018 2.2  <5 Final            Triglyceride, Fasting 12/03/2018 60  <150 mg/dL Final    Comment:    Triglyceride Guidelines:     <150   Desirable   150-199  Borderline   200-499  High     >499   Very high   Based on AHA Guidelines for fasting triglyceride, October 2012.          VLDL 12/03/2018 NOT REPORTED  1 - 30 mg/dL Final    Hemoglobin A1C 12/03/2018 5.6  4.8 - 5.9 % Final    Estimated Avg Glucose 12/03/2018 114  mg/dL Final          Assessment and Plan:    1. Depression, unspecified depression type  As above, she did not tolerate Celexa. I will have her begin a low dose of Fluoxetine:  - FLUoxetine (PROZAC) 10 MG capsule; Take 1 capsule by mouth daily  Dispense: 30 capsule; Refill: 1    I have asked her to return in approximately 4 weeks for re-evaluation, or sooner prn.    2. Elevated glucose  Her fasting glucose (87 mg/dL) and HgbA1c (5.6%) are both within normal limits on her recent lab work done 12/3/2018. I recommended that she decrease her intake of processed carbohydrates, begin a regular exercise program, and attempt to lose weight. 3. Cystocele with rectocele  She requested a referral to [facility], and a referral was ordered:  - External Referral To Urogynecology    4. Pain and swelling of toe, left  - XR FOOT LEFT (MIN 3 VIEWS); Future was ordered to be done today. She will be contacted when the radiologist's interpretation of her x-rays is available. 5.  Routine health maintenance  Health maintenance was reviewed with the patient. She has had one dose of Shingrix. All other health maintenance, including Pneumovax, Prevnar-13, and Tdap, is up to date at this time.       (Please note that portions of this note were completed with a voice-recognition program. Efforts were made to edit the dictation but occasionally words are mis-transcribed.)

## 2019-05-15 NOTE — PATIENT INSTRUCTIONS
Hospital Outpatient Visit on 01/14/2019   Component Date Value Ref Range Status    Vit D, 25-Hydroxy 01/14/2019 47.9  30.0 - 100.0 ng/mL Final    Comment:    Reference Range:  Vitamin D status         Range   Deficiency              <20 ng/mL   Mild Deficiency       20-30 ng/mL   Sufficiency           ng/mL   Toxicity               >100 ng/mL     Hospital Outpatient Visit on 12/03/2018   Component Date Value Ref Range Status    Thyroxine, Free 12/03/2018 1.28  0.93 - 1.70 ng/dL Final    TSH 12/03/2018 2.93  0.30 - 5.00 mIU/L Final    WBC 12/03/2018 5.5  3.5 - 11.0 k/uL Final    RBC 12/03/2018 4.57  4.0 - 5.2 m/uL Final    Hemoglobin 12/03/2018 13.2  12.0 - 16.0 g/dL Final    Hematocrit 12/03/2018 40.5  36 - 46 % Final    MCV 12/03/2018 88.6  80 - 100 fL Final    MCH 12/03/2018 28.9  26 - 34 pg Final    MCHC 12/03/2018 32.6  31 - 37 g/dL Final    RDW 12/03/2018 14.0  11.0 - 14.5 % Final    Platelets 35/83/4761 335  140 - 450 k/uL Final    MPV 12/03/2018 7.5  6.0 - 12.0 fL Final    NRBC Automated 12/03/2018 NOT REPORTED  per 100 WBC Final    Differential Type 12/03/2018 NOT REPORTED   Final    Immature Granulocytes 12/03/2018 NOT REPORTED  0 % Final    Absolute Immature Granulocyte 12/03/2018 NOT REPORTED  0.00 - 0.30 k/uL Final    WBC Morphology 12/03/2018 NOT REPORTED   Final    RBC Morphology 12/03/2018 NOT REPORTED   Final    Platelet Estimate 16/07/7988 NOT REPORTED   Final    Seg Neutrophils 12/03/2018 62  43 - 77 % Final    Lymphocytes 12/03/2018 28  16 - 46 % Final    Monocytes 12/03/2018 8  4 - 11 % Final    Eosinophils % 12/03/2018 1  1 - 7 % Final    Basophils 12/03/2018 1  0 - 1 % Final    Segs Absolute 12/03/2018 3.40  1.8 - 7.7 k/uL Final    Absolute Lymph # 12/03/2018 1.60  1.0 - 4.8 k/uL Final    Absolute Mono # 12/03/2018 0.40  0.1 - 1.2 k/uL Final    Absolute Eos # 12/03/2018 0.10  0.0 - 0.4 k/uL Final    Basophils # 12/03/2018 0.00  0.0 - 0.2 k/uL Final    Glucose 12/03/2018 87  70 - 99 mg/dL Final    BUN 12/03/2018 13  8 - 23 mg/dL Final    CREATININE 12/03/2018 0.66  0.50 - 0.90 mg/dL Final    Bun/Cre Ratio 12/03/2018 20  9 - 20 Final    Calcium 12/03/2018 9.4  8.6 - 10.4 mg/dL Final    Sodium 12/03/2018 139  135 - 144 mmol/L Final    Potassium 12/03/2018 4.0  3.7 - 5.3 mmol/L Final    Chloride 12/03/2018 103  98 - 107 mmol/L Final    CO2 12/03/2018 25  20 - 31 mmol/L Final    Anion Gap 12/03/2018 11  9 - 17 mmol/L Final    Alkaline Phosphatase 12/03/2018 72  35 - 104 U/L Final    ALT 12/03/2018 20  5 - 33 U/L Final    AST 12/03/2018 23  <32 U/L Final    Total Bilirubin 12/03/2018 0.30  0.3 - 1.2 mg/dL Final    Total Protein 12/03/2018 7.6  6.4 - 8.3 g/dL Final    Alb 12/03/2018 4.0  3.5 - 5.2 g/dL Final    Albumin/Globulin Ratio 12/03/2018 1.1  1.0 - 2.5 Final    GFR Non- 12/03/2018 >60  >60 mL/min Final    GFR  12/03/2018 >60  >60 mL/min Final    GFR Comment 12/03/2018        Final    Comment: Average GFR for 79or more years old:   76 mL/min/1.73sq m  Chronic Kidney Disease:   <60 mL/min/1.73sq m  Kidney failure:   <15 mL/min/1.73sq m              eGFR calculated using average adult body mass. Additional eGFR calculator   available at:        [email or web address]            GFR Staging 12/03/2018 NOT REPORTED   Final    Hepatitis C Ab 12/03/2018 NONREACTIVE  NR Final    Comment:       The hepatitis C procedure used in our laboratory is a Chemiluminescent test   specific for three recombinant HCV antigens. A negative anti-HCV result   indicates that the antibodies to hepatitis C virus are not present at this   time. Individuals with reactive anti-HCV should be considered infected and infectious   until proven otherwise. Confirmation of all equivocal or reactive results is   recommended by ordering HCV RNA by PCR.       Cholesterol, Fasting 12/03/2018 92  <200 mg/dL Final    Comment:

## 2019-06-19 ENCOUNTER — HOSPITAL ENCOUNTER (OUTPATIENT)
Dept: LAB | Age: 74
Discharge: HOME | End: 2019-06-19
Payer: MEDICARE

## 2019-06-19 ENCOUNTER — OFFICE VISIT (OUTPATIENT)
Dept: FAMILY MEDICINE CLINIC | Age: 74
End: 2019-06-19
Payer: MEDICARE

## 2019-06-19 VITALS
HEIGHT: 63 IN | BODY MASS INDEX: 33.84 KG/M2 | SYSTOLIC BLOOD PRESSURE: 122 MMHG | OXYGEN SATURATION: 98 % | HEART RATE: 65 BPM | DIASTOLIC BLOOD PRESSURE: 60 MMHG | WEIGHT: 191 LBS

## 2019-06-19 DIAGNOSIS — F32.A DEPRESSION, UNSPECIFIED DEPRESSION TYPE: ICD-10-CM

## 2019-06-19 DIAGNOSIS — R73.09 ELEVATED GLUCOSE: ICD-10-CM

## 2019-06-19 DIAGNOSIS — F41.9 ANXIETY DISORDER, UNSPECIFIED TYPE: ICD-10-CM

## 2019-06-19 DIAGNOSIS — E66.9 CLASS 1 OBESITY WITHOUT SERIOUS COMORBIDITY WITH BODY MASS INDEX (BMI) OF 34.0 TO 34.9 IN ADULT, UNSPECIFIED OBESITY TYPE: ICD-10-CM

## 2019-06-19 DIAGNOSIS — Z86.010 HISTORY OF COLON POLYPS: ICD-10-CM

## 2019-06-19 DIAGNOSIS — Z00.00 ROUTINE GENERAL MEDICAL EXAMINATION AT A HEALTH CARE FACILITY: Primary | ICD-10-CM

## 2019-06-19 LAB
ALBUMIN SERPL-MCNC: 4.1 G/DL (ref 3.5–5.2)
ALBUMIN/GLOBULIN RATIO: 1.1 (ref 1–2.5)
ALP BLD-CCNC: 69 U/L (ref 35–104)
ALT SERPL-CCNC: 23 U/L (ref 5–33)
ANION GAP SERPL CALCULATED.3IONS-SCNC: 9 MMOL/L (ref 9–17)
AST SERPL-CCNC: 26 U/L
BILIRUB SERPL-MCNC: 0.35 MG/DL (ref 0.3–1.2)
BUN BLDV-MCNC: 19 MG/DL (ref 8–23)
BUN/CREAT BLD: 25 (ref 9–20)
CALCIUM SERPL-MCNC: 9.7 MG/DL (ref 8.6–10.4)
CHLORIDE BLD-SCNC: 102 MMOL/L (ref 98–107)
CHOLESTEROL/HDL RATIO: 2.3
CHOLESTEROL: 100 MG/DL
CO2: 29 MMOL/L (ref 20–31)
CREAT SERPL-MCNC: 0.76 MG/DL (ref 0.5–0.9)
ESTIMATED AVERAGE GLUCOSE: 108 MG/DL
GFR AFRICAN AMERICAN: >60 ML/MIN
GFR NON-AFRICAN AMERICAN: >60 ML/MIN
GFR SERPL CREATININE-BSD FRML MDRD: ABNORMAL ML/MIN/{1.73_M2}
GFR SERPL CREATININE-BSD FRML MDRD: ABNORMAL ML/MIN/{1.73_M2}
GLUCOSE BLD-MCNC: 94 MG/DL (ref 70–99)
HBA1C MFR BLD: 5.4 % (ref 4.8–5.9)
HDLC SERPL-MCNC: 44 MG/DL
LDL CHOLESTEROL: 46 MG/DL (ref 0–130)
POTASSIUM SERPL-SCNC: 4.5 MMOL/L (ref 3.7–5.3)
SODIUM BLD-SCNC: 140 MMOL/L (ref 135–144)
THYROXINE, FREE: 1.28 NG/DL (ref 0.93–1.7)
TOTAL PROTEIN: 7.8 G/DL (ref 6.4–8.3)
TRIGL SERPL-MCNC: 48 MG/DL
TSH SERPL DL<=0.05 MIU/L-ACNC: 4.69 MIU/L (ref 0.3–5)
VLDLC SERPL CALC-MCNC: NORMAL MG/DL (ref 1–30)

## 2019-06-19 PROCEDURE — G0438 PPPS, INITIAL VISIT: HCPCS | Performed by: FAMILY MEDICINE

## 2019-06-19 PROCEDURE — G8400 PT W/DXA NO RESULTS DOC: HCPCS | Performed by: FAMILY MEDICINE

## 2019-06-19 PROCEDURE — 1123F ACP DISCUSS/DSCN MKR DOCD: CPT | Performed by: FAMILY MEDICINE

## 2019-06-19 PROCEDURE — 80061 LIPID PANEL: CPT

## 2019-06-19 PROCEDURE — 3017F COLORECTAL CA SCREEN DOC REV: CPT | Performed by: FAMILY MEDICINE

## 2019-06-19 PROCEDURE — 80053 COMPREHEN METABOLIC PANEL: CPT

## 2019-06-19 PROCEDURE — 83036 HEMOGLOBIN GLYCOSYLATED A1C: CPT

## 2019-06-19 PROCEDURE — 1036F TOBACCO NON-USER: CPT | Performed by: FAMILY MEDICINE

## 2019-06-19 PROCEDURE — G8417 CALC BMI ABV UP PARAM F/U: HCPCS | Performed by: FAMILY MEDICINE

## 2019-06-19 PROCEDURE — 84443 ASSAY THYROID STIM HORMONE: CPT

## 2019-06-19 PROCEDURE — 36415 COLL VENOUS BLD VENIPUNCTURE: CPT

## 2019-06-19 PROCEDURE — 4040F PNEUMOC VAC/ADMIN/RCVD: CPT | Performed by: FAMILY MEDICINE

## 2019-06-19 PROCEDURE — G8427 DOCREV CUR MEDS BY ELIG CLIN: HCPCS | Performed by: FAMILY MEDICINE

## 2019-06-19 PROCEDURE — 99213 OFFICE O/P EST LOW 20 MIN: CPT | Performed by: FAMILY MEDICINE

## 2019-06-19 PROCEDURE — 1090F PRES/ABSN URINE INCON ASSESS: CPT | Performed by: FAMILY MEDICINE

## 2019-06-19 PROCEDURE — 84439 ASSAY OF FREE THYROXINE: CPT

## 2019-06-19 RX ORDER — FLUOXETINE HYDROCHLORIDE 20 MG/1
20 CAPSULE ORAL DAILY
Qty: 30 CAPSULE | Refills: 1 | Status: SHIPPED | OUTPATIENT
Start: 2019-06-19 | End: 2019-07-17 | Stop reason: SDUPTHER

## 2019-06-19 ASSESSMENT — PATIENT HEALTH QUESTIONNAIRE - PHQ9
SUM OF ALL RESPONSES TO PHQ9 QUESTIONS 1 & 2: 0
2. FEELING DOWN, DEPRESSED OR HOPELESS: 0
SUM OF ALL RESPONSES TO PHQ QUESTIONS 1-9: 0
1. LITTLE INTEREST OR PLEASURE IN DOING THINGS: 0
SUM OF ALL RESPONSES TO PHQ QUESTIONS 1-9: 0

## 2019-06-19 ASSESSMENT — LIFESTYLE VARIABLES: HOW OFTEN DO YOU HAVE A DRINK CONTAINING ALCOHOL: 0

## 2019-06-19 ASSESSMENT — ANXIETY QUESTIONNAIRES: GAD7 TOTAL SCORE: 0

## 2019-06-19 NOTE — PROGRESS NOTES
[address], [address] [place] [address] Drive                        Telephone [phone]             Fax [phone]     Yoselin Nolan  1945  MRN:  [id number]  Date of visit:  6/19/2019    Subjective:    Yoselin Nolan is a 72 y.o.  female who presents to [facility] today (6/19/2019) for follow up/evaluation of:  Medicare AWV and 1 Month Follow-Up (depression; started taking prozac 1 month ago; unsure if working)    Overall, she states that she has been feeling well. Fluoxetine was prescribed at her last office visit on 5/15/2019, Fluoxetine 10 mg was prescribed. She is not certain if it is helping. She denies side effects. She has the following problem list:  Patient Active Problem List   Diagnosis    Vitamin D deficiency    Obesity (BMI 30-39. 9)    Hypothyroidism    Hyperglycemia    Osteopenia of left hip    Dysphagia        Current medications are:  Outpatient Medications Marked as Taking for the 6/19/19 encounter (Office Visit) with Viki Velásquez MD   Medication Sig Dispense Refill    FLUoxetine (PROZAC) 10 MG capsule Take 1 capsule by mouth daily 30 capsule 1    Cholecalciferol (VITAMIN D3) 5000 UNITS TABS Take 5,000 Units by mouth daily      co-enzyme Q-10 50 MG capsule Take 50 mg by mouth daily      magnesium oxide (MAG-OX) 400 MG tablet Take 400 mg by mouth daily      Omega-3 300 MG CAPS Take 300 mg by mouth daily         She is allergic to asa [aspirin]; celexa [citalopram hydrobromide]; penicillins; and sulfur. She  reports that she has never smoked. She has never used smokeless tobacco.      Objective:    Vitals:    06/19/19 0911   BP: 122/60   Pulse: 65   SpO2: 98%   Weight: 191 lb (86.6 kg)   Height: 5' 3\" (1.6 m)     Body mass index is 33.83 kg/m². Obese elderly  female, healthy-appearing, alert, cooperative and in no distress.   Neck

## 2019-06-19 NOTE — PROGRESS NOTES
Medicare Annual Wellness Visit  Name: Ramses Pedersen Date: 2019   MRN: [id number] Sex: Female   Age: 72 y.o. Ethnicity: Non-/Non    : 1945 Race: Roselyn Sher is here for Medicare AWV and 1 Month Follow-Up (depression; started taking prozac 1 month ago; unsure if working)    Screenings for behavioral, psychosocial and functional/safety risks, and cognitive dysfunction are all negative except as indicated below. These results, as well as other patient data from the [address] form, are documented in Flowsheets linked to this Encounter. Allergies   Allergen Reactions    Asa [Aspirin]      Nausea and Vomiting    Celexa [Citalopram Hydrobromide]      dizziness    Penicillins Nausea Only    Sulfur Hives     Prior to Visit Medications    Medication Sig Taking?  Authorizing Provider   FLUoxetine (PROZAC) 10 MG capsule Take 1 capsule by mouth daily Yes Bekah Quiroga MD   Cholecalciferol (VITAMIN D3) 5000 UNITS TABS Take 5,000 Units by mouth daily Yes Historical Provider, MD   co-enzyme Q-10 50 MG capsule Take 50 mg by mouth daily Yes Historical Provider, MD   magnesium oxide (MAG-OX) 400 MG tablet Take 400 mg by mouth daily Yes Historical Provider, MD   Omega-3 300 MG CAPS Take 300 mg by mouth daily Yes Historical Provider, MD     Past Medical History:   Diagnosis Date    ADHD (attention deficit hyperactivity disorder)     Dysphagia     schatzki ring    Hyperglycemia     Vitamin D deficiency      Past Surgical History:   Procedure Laterality Date    COLONOSCOPY  2013    internal hemorrhoids,two sessile polyps ascending colon,few diverticula noted descending colon and sigmoid colon, per Dr Demond Chen at [id number] ARTHROSCOPY Left    [facility] OTHER SURGICAL HISTORY  2009    lipoma removed from upper arm    UPPER GASTROINTESTINAL ENDOSCOPY  2013    fragments of squamous mucosa with minimal,non-specific reactive epithelial changes,negative Hpylori,intestinal metaplasia or dysplasia,negative for active inflammatory changes per Dr Chayito Edwards at [facility] History   Problem Relation Age of Onset    Cataracts Father     Diabetes Father     Alzheimer's Disease Mother     Diabetes Brother     Diabetes Sister     Glaucoma Neg Hx        CareTeam (Including outside providers/suppliers regularly involved in providing care):   Patient Care Team:  Maria Teresa Paredes MD as PCP - General (Family Medicine)  Maria Teresa Paredes MD as PCP - [facility] Provider    Wt Readings from Last 3 Encounters:   06/19/19 191 lb (86.6 kg)   05/15/19 194 lb 3.2 oz (88.1 kg)   02/22/19 194 lb 12.8 oz (88.4 kg)     Vitals:    06/19/19 0911   BP: 122/60   Pulse: 65   SpO2: 98%   Weight: 191 lb (86.6 kg)   Height: 5' 3\" (1.6 m)     Body mass index is 33.83 kg/m². Based upon direct observation of the patient, evaluation of cognition reveals recent and remote memory intact. Patient's complete Health Risk Assessment and screening values have been reviewed and are found in Flowsheets. The following problems were reviewed today and where indicated follow up appointments were made and/or referrals ordered. Positive Risk Factor Screenings with Interventions:     General Health:  General  In general, how would you say your health is?: Very Good  In the past 7 days, have you experienced any of the following?  New or Increased Pain, New or Increased Fatigue, Loneliness, Social Isolation, Stress or Anger?: (!) Stress, Anger  Do you get the social and emotional support that you need?: Yes  Do you have a Living Will?: Yes  General Health Risk Interventions:  · Stress: counseling/psychotherapy referral ordered for further evaluation/treatment    Health Habits/Nutrition:  Health Habits/Nutrition  Do you exercise for at least 20 minutes 2-3 times per week?: Yes  Have you lost any weight without trying in the past 3 months?: No  Do you eat fewer than 2 meals per day?: No  Have you seen a dentist within the past year?: Yes  Body mass index is 33.83 kg/m². Health Habits/Nutrition Interventions:  · Nutritional issues:  educational materials for healthy, well-balanced diet provided    Safety:  Safety  Do you have working smoke detectors?: Yes  Have all throw rugs been removed or fastened?: Yes  Do you have non-slip mats in all bathtubs?: Yes  Do all of your stairways have a railing or banister?: (!) No(one level home)  Are your doorways, halls and stairs free of clutter?: Yes  Do you always fasten your seatbelt when you are in a car?: Yes  Safety Interventions:  · Patient declines any further evaluation/treatment for this issue    Personalized Preventive Plan   Current Health Maintenance Status  Immunization History   Administered Date(s) Administered    Influenza Vaccine, unspecified formulation 10/02/2017    Influenza, High Dose (Fluzone 65 yrs and older) 11/28/2018    Pneumococcal Conjugate 13-valent (Lindsay Beers) 01/29/2019    Pneumococcal Polysaccharide (Oqahesnwu80) 07/12/2004, 09/18/2007, 08/22/2013    Td, unspecified formulation 05/04/2018    Tdap (Boostrix, Adacel) 09/03/2012, 11/28/2018    Zoster Recombinant (Shingrix) 03/01/2019, 05/15/2019        Health Maintenance   Topic Date Due    Annual Wellness Visit (AWV)  09/22/2010    TSH testing  12/03/2019    Breast cancer screen  01/28/2020    DEXA (modify frequency per FRAX score)  01/08/2021    Colon cancer screen colonoscopy  08/12/2023    Lipid screen  12/03/2023    DTaP/Tdap/Td vaccine (4 - Td) 11/28/2028    Flu vaccine  Completed    Shingles Vaccine  Completed    Pneumococcal 65+ years Vaccine  Completed    Hepatitis C screen  Completed     Recommendations for Preventive Services Due: see orders and patient instructions/AVS.  .   Recommended screening schedule for the next 5-10 years is provided to the patient in written form: see Patient Instructions/AVS.

## 2019-06-19 NOTE — PATIENT INSTRUCTIONS
Patient Education        Learning About Mindfulness for Stress  What are mindfulness and stress? Stress is what you feel when you have to handle more than you are used to. A lot of things can cause stress. You may feel stress when you go on a job interview, take a test, or run a race. This kind of short-term stress is normal and even useful. It can help you if you need to work hard or react quickly. Stress also can last a long time. Long-term stress is caused by stressful situations or events. Examples of long-term stress include long-term health problems, ongoing problems at work, and conflicts in your family. Long-term stress can harm your health. Mindfulness is a focus only on things happening in the present moment. It's a process of purposefully paying attention to and being aware of your surroundings, your emotions, your thoughts, and how your body feels. You are aware of these things, but you aren't judging these experiences as \"good\" or \"bad. \" Mindfulness can help you learn to calm your mind and body to help you cope with illness, pain, and stress. How does mindfulness help to relieve stress? Mindfulness can help quiet your mind and relax your body. Studies show that it can help some people sleep better, feel less anxious, and bring their blood pressure down. And it's been shown to help some people live and cope better with certain health problems like heart disease, depression, chronic pain, and cancer. How do you practice mindfulness? To be mindful is to pay attention, to be present, and to be accepting. · When you're mindful, you do just one thing and you pay close attention to that one thing. For example, you may sit quietly and notice your emotions or how your food tastes and smells. · When you're present, you focus on the things that are happening right now. You let go of your thoughts about the past and the future.  When you dwell on the past or the future, you miss moments that can heal and strengthen you. You may miss moments like hearing a child laugh or seeing a friendly face when you think you're all alone. · When you're accepting, you don't  the present moment. Instead you accept your thoughts and feelings as they come. You can practice anytime, anywhere, and in any way you choose. You can practice in many ways. Here are a few ideas:  · While doing your chores, like washing the dishes, let your mind focus on what's in your hand. What does the dish feel like? Is the water warm or cold? · Go outside and take a few deep breaths. What is the air like? Is it warm or cold? · When you can, take some time at the start of your day to sit alone and think. · Take a slow walk by yourself. Count your steps while you breathe in and out. · Try yoga breathing exercises, stretches, and poses to strengthen and relax your muscles. · At work, if you can, try to stop for a few moments each hour. Note how your body feels. Let yourself regroup and let your mind settle before you return to what you were doing. · If you struggle with anxiety or \"worry thoughts,\" imagine your mind as a blue celio and your worry thoughts as clouds. Now imagine those worry thoughts floating across your mind's celio. Just let them pass by as you watch. Follow-up care is a key part of your treatment and safety. Be sure to make and go to all appointments, and call your doctor if you are having problems. It's also a good idea to know your test results and keep a list of the medicines you take. Where can you learn more? Go to https://[employer] Inventory S[pecialists. org and sign in to your [facility] account. Enter L685 in the [employer] box to learn more about \"Learning About Mindfulness for Stress. \"     If you do not have an account, please click on the \"Sign Up Now\" link. Current as of: June 28, 2018  Content Version: 12.0  © 1529-4421 Healthwise, Incorporated. Care instructions adapted under license by [facility] ([facility]).  If you LDL and decreases HDL     It is generally recommended that you limit your total fat for the day to less than 30% of your total calories. If you follow an 1800-calorie heart healthy diet, for example, this would mean 60 grams of fat or less per day. Saturated fat and trans fat in your diet raises your blood cholesterol the most, much more than dietary cholesterol does. For this reason, on a heart-healthy diet, less than 7% of your calories should come from saturated fat and ideally 0% from trans fat. On an 1800-calorie diet, this translates into less than 14 grams of saturated fat per day, leaving 46 grams of fat to come from mono- and polyunsaturated fats.    Food Choices on a Heart Healthy Diet   Food Category   Foods Recommended   Foods to Avoid   Grains   Breads and rolls without salted tops Most dry and cooked cereals Unsalted crackers and breadsticks Low-sodium or homemade breadcrumbs or stuffing All rice and pastas   Breads, rolls, and crackers with salted tops High-fat baked goods (eg, muffins, donuts, pastries) Quick breads, self-rising flour, and biscuit mixes Regular bread crumbs Instant hot cereals Commercially prepared rice, pasta, or stuffing mixes   Vegetables   Most fresh, frozen, and low-sodium canned vegetables Low-sodium and salt-free vegetable juices Canned vegetables if unsalted or rinsed   Regular canned vegetables and juices, including sauerkraut and pickled vegetables Frozen vegetables with sauces Commercially prepared potato and vegetable mixes   Fruits   Most fresh, frozen, and canned fruits All fruit juices   Fruits processed with salt or sodium   Milk   Nonfat or low-fat (1%) milk Nonfat or low-fat yogurt Cottage cheese, low-fat ricotta, cheeses labeled as low-fat and low-sodium   Whole milk Reduced-fat (2%) milk Malted and chocolate milk Full fat yogurt Most cheeses (unless low-fat and low salt) Buttermilk (no more than 1 cup per week)   Meats and Beans   Lean cuts of fresh or frozen products low in fat and cholesterol, look for fat free, low-fat, cholesterol free, saturated fat free, and trans fat freeAlso scan the Nutrition Facts Label, which lists saturated fat, trans fat, and cholesterol amounts. For products low in sodium, look for sodium free, very low sodium, low sodium, no added salt, and unsalted   Skip the salt when cooking or at the table; if food needs more flavor, get creative and try out different herbs and spices. Garlic and onion also add substantial flavor to foods. Trim any visible fat off meat and poultry before cooking, and drain the fat off after walsh. Use cooking methods that require little or no added fat, such as grilling, boiling, baking, poaching, broiling, roasting, steaming, stir-frying, and sauting. Avoid fast food and convenience food. They tend to be high in saturated and trans fat and have a lot of added salt. Talk to a registered dietitian for individualized diet advice.       Last Reviewed: March 2011 Serena Park MS, MPH, RD   Updated: 3/29/2011   ·

## 2019-06-26 ENCOUNTER — OFFICE VISIT (OUTPATIENT)
Dept: BEHAVIORAL/MENTAL HEALTH | Age: 74
End: 2019-06-26
Payer: MEDICARE

## 2019-06-26 DIAGNOSIS — F43.23 ADJUSTMENT DISORDER WITH MIXED ANXIETY AND DEPRESSED MOOD: Primary | ICD-10-CM

## 2019-06-26 DIAGNOSIS — R41.840 ATTENTION DISTURBANCE: ICD-10-CM

## 2019-06-26 PROCEDURE — 90791 PSYCH DIAGNOSTIC EVALUATION: CPT | Performed by: COUNSELOR

## 2019-06-26 NOTE — PROGRESS NOTES
Behavioral Health Consultation  Alfredo Mckeon PsyD  Psychologist  6/26/2019  6:46 PM      Time spent with Patient:  60 minutes  This is patient's first  [facility] appointment. Reason for Consult:  depression  Referring Provider: Virginia Ashraf MD  [address] [place] [address], [id number]    Pt provided informed consent for the behavioral health program. Discussed with patient model of service to include the limits of confidentiality (i.e. abuse reporting, suicide intervention, etc.) and short-term intervention focused approach. Pt indicated understanding. S:  Patient presented alone for appointment, and engaged without hesitation. Patient was highly animated and notably verbose throughout appointment. Patient speech was tangential, at times repetitive, and difficult to interrupt. Patient appeared to have a generally good fund of knowledge and recall. Patient reported that she has not begun her new antidepressant medication, and stated that she does not think it would be of benefit to her. Patient reported that she is not sure that she is actually experiencing depression, but knows that she feels as if something is abnormal about her. Patient reported difficulty sleeping, but denied appetite disruption, and reported engagement in a variety of healthful activities. Patient stated that she has been struggling with the lack of stimulation since moving from [place] to [place] 5 years ago. Patient reported that she is hoping to move back to [facility], but is struggling with the financial aspects of doing so. Patient reported that she has believed for decades that she had ADHD, but believed that it was under control. Patient stated that she had requested evaluation for this approximately 30 years ago; patient reported that she would attempt to provide a copy of associated paperwork for review.   Patient reported some ongoing anxiety related to self-monitoring and feeling overwhelmed by family difficulties and caregiving duties. Patient stated that she feels as if she is drowning in paper at her home, because she is generally disorganized. O:  MSE:    Appearance    alert, cooperative  Appetite normal  Sleep disturbance Yes  Loss of pleasure mild  Speech    normal volume, well articulated, rapid and hyperverbal  Mood    Anxious  Affect    normal affect  Thought Content    tangential  Insight    Fair  Judgment    Intact  Suicide Assessment    no suicidal ideation      History:    Medications:   Current Outpatient Medications   Medication Sig Dispense Refill    FLUoxetine (PROZAC) 20 MG capsule Take 1 capsule by mouth daily 30 capsule 1    Cholecalciferol (VITAMIN D3) 5000 UNITS TABS Take 5,000 Units by mouth daily      co-enzyme Q-10 50 MG capsule Take 50 mg by mouth daily      magnesium oxide (MAG-OX) 400 MG tablet Take 400 mg by mouth daily      Omega-3 300 MG CAPS Take 300 mg by mouth daily       No current facility-administered medications for this visit.         Social History:   Social History     Socioeconomic History    Marital status: Single     Spouse name: Not on file    Number of children: Not on file    Years of education: Not on file    Highest education level: Not on file   Occupational History    Not on file   Social Needs    Financial resource strain: Not on file    Food insecurity:     Worry: Not on file     Inability: Not on file    Transportation needs:     Medical: Not on file     Non-medical: Not on file   Tobacco Use    Smoking status: Never Smoker    Smokeless tobacco: Never Used   Substance and Sexual Activity    Alcohol use: No    Drug use: No    Sexual activity: Never   Lifestyle    Physical activity:     Days per week: Not on file     Minutes per session: Not on file    Stress: Not on file   Relationships    Social connections:     Talks on phone: Not on file     Gets together: Not on file     Attends Hinduism service: Not on file     Active member of club or organization: Not on file     Attends meetings of clubs or organizations: Not on file     Relationship status: Not on file    Intimate partner violence:     Fear of current or ex partner: Not on file     Emotionally abused: Not on file     Physically abused: Not on file     Forced sexual activity: Not on file   Other Topics Concern    Not on file   Social History Narrative    Not on file       TOBACCO:   reports that she has never smoked. She has never used smokeless tobacco.  ETOH:   reports that she does not drink alcohol. Family History:   Family History   Problem Relation Age of Onset    Cataracts Father     Diabetes Father     Alzheimer's Disease Mother     Diabetes Brother     Diabetes Sister     Glaucoma Neg Hx            A:  I do not think that patient is experiencing a full clinical depression, but is experiencing adjustment difficulties following her move to this area. Patient reports a historical ADHD diagnosis, and I do think this may very well be accurate. I have asked patient to provide any paperwork that she has regarding the past diagnosis, and plan to explore further myself to better evaluate patient's current functioning with regard to any extant symptoms. If this is indeed the case, patient's feelings of being out of sorts may very well be due to stress associated with the involved executive function dysregulation. Patient has agreed to return for follow-up, and I plan to complete a brief cognitive evaluation, as well as more in-depth exploration of reported executive function symptoms.     Diagnosis:  Adjustment disorder with mixed anxiety and depressed mood  Attention disturbance  R/O Attention deficit/hyperactivity disorder        Diagnosis Date    ADHD (attention deficit hyperactivity disorder)     Dysphagia     schatzki ring    Hyperglycemia     Vitamin D deficiency          Plan:  Pt interventions:  Discussed various factors related to the development and maintenance of  stress (including biological, cognitive, behavioral, and environmental factors), Discussed and set plan for behavioral activation, Discussed self-care (sleep, nutrition, rewarding activities, social support, exercise), Established rapport, Conducted functional assessment, [place]-setting to identify pt's primary goals for [facility] visit / overall health and Supportive techniques      Pt Behavioral Change Plan:  **If you have copies of the past psychological evaluation(s) that were done, please bring them with you next time. 1) Review the attached information on sleep hygiene. Go through the worksheet at the end of it; are there things that make sense to try right now? If so, do them. 2) Make sure to eat regularly. If you haven't eaten in 3-4 hours, grab at least a small snack, even if you have no appetite. If your stomach is talking to you, listen. 3) Aim for at least 30-40 cumulative minutes of movement each day. This doesn't have to be all at once, or a workout. Just stretch or dance to a song you like. If you've been sitting for more than an hour or two, get up and move around for a few minutes. 4) Review the attached list of coping skills. Try 1-2 a week; do more if you're feeling frisky! 5) Review the attached information on deep breathing. Use this to help manage intense emotions, or just to relax. Practice this daily, even if you're not feeling particularly stressed. 6) Are there things that you've stopped doing because of stress or your mood? If so, make note of them and consider some ways to reincorporate them into your life. This is also something that can be discussed in counseling. 7) Remember to be kind to yourself. This is a challenging experience, and you're doing the best you can. Patient to return in 2 weeks for follow-up. Patient has been made aware and expressed understanding of resources and procedures for crisis contact if needed.

## 2019-06-26 NOTE — PATIENT INSTRUCTIONS
**If you have copies of the past psychological evaluation(s) that were done, please bring them with you next time. 1) Review the attached information on sleep hygiene. Go through the worksheet at the end of it; are there things that make sense to try right now? If so, do them. 2) Make sure to eat regularly. If you haven't eaten in 3-4 hours, grab at least a small snack, even if you have no appetite. If your stomach is talking to you, listen. 3) Aim for at least 30-40 cumulative minutes of movement each day. This doesn't have to be all at once, or a workout. Just stretch or dance to a song you like. If you've been sitting for more than an hour or two, get up and move around for a few minutes. 4) Review the attached list of coping skills. Try 1-2 a week; do more if you're feeling frisky! 5) Review the attached information on deep breathing. Use this to help manage intense emotions, or just to relax. Practice this daily, even if you're not feeling particularly stressed. 6) Are there things that you've stopped doing because of stress or your mood? If so, make note of them and consider some ways to reincorporate them into your life. This is also something that can be discussed in counseling. 7) Remember to be kind to yourself. This is a challenging experience, and you're doing the best you can. Sleep Hygiene Guidelines    Good dental hygiene is important in determining the health of your teeth and gums. We all know we are supposed to brush and floss regularly. Those who do so are more likely to have strong, healthy gums and less cavities. Similarly, good sleep hygiene is important in determining the quality and quantity of your sleep. Below are guidelines for good sleep hygiene practices. Review these guidelines and evaluate how well you practice good sleep hygiene.     Caffeine:  Avoid Caffeine 6-8 Hours Before Bedtime       Caffeine disturbs sleep, even in people who do not think they experience a stimulation effect. Individuals with insomnia are often more sensitive to mild stimulants than are normal sleepers. Caffeine is found in items such as coffee, tea, soda, chocolate, and many over-the-counter medications (e.g., Excedrin). Thus, drinking caffeinated beverages should be avoided near bedtime and during the night. You might consider a trial period of no caffeine if you tend to be sensitive to its effects. Nicotine:  Avoid Nicotine Before Bedtime       Although some smokers claim that smoking helps them relax, but nicotine is a stimulant. The initial relaxing effects occur with the initial entry of the nicotine, but as the nicotine builds in the system it produces an effect similar to caffeine. Thus, smoking, dipping, or chewing tobacco should be avoided near bedtime and during the night. Dont smoke to get yourself back to sleep. Alcohol:  Avoid Alcohol After Dinner       Alcohol often promotes the onset of sleep, but as alcohol is metabolized sleep becomes disturbed and fragmented. Thus, a large amount of alcohol is a poor sleep aid and should not be used as such. Limit alcohol use to small quantities to moderate quantities. Sleeping Pills:  Sleep Medications are Effective Only Temporarily       Scientists have shown that sleep medications lose their effectiveness in about 2 - 4 weeks when taken regularly. Despite advertisements to the contrary, over-the-counter sleeping aids have little impact on sleep beyond the placebo effect. Over time, sleeping pills actually can make sleep problems worse. When sleeping pills have been used for a long period, withdrawal from the medication can lead to an insomnia rebound. Thus, after long-term use, many individuals incorrectly conclude that they need sleeping pills in order to sleep normally. Keep use of sleep pills infrequent, but dont worry if you need t use one on an occasional basis.     Regular Exercise       Get regular exercise, preferably 40 minutes each day of an activity that causes sweating. .  Exercise in the late afternoon or early evening seems to aid sleep, although the positive effect often takes several weeks to become noticeable. Exercising sporadically is not likely to improve sleep, and exercise within 2 hours of bedtime may elevate nervous system activity and interfere with sleep onset. Hot Baths  Spending 20 minutes in a tub of hot water an hour or two prior to bedtime may promote sleep and is strongly recommended. Bedroom Environment: Moderate Temperature, Quiet, and Dark       Extremes of heat or cold can disrupt sleep. A quiet environment is more sleep promoting than a noisy one. Noises can be masked with background white noise (such as the noise of a fan) or with earplugs. Bedrooms may be darkened with black-out shades or sleep masks can be worn. Position clocks out-of-sight since clock-watching can increase worry about the effects of lack of sleep. Be sure your mattress is not too soft or too firm and that your pillow is the right height and firmness. Eating       A light bedtime snack, such a glass of warm milk, cheese, or a bowl of cereal can promote sleep. You should avoid the following foods at bedtime:  any caffeinated foods (e.g., chocolate), peanuts, beans, most raw fruits and vegetables (since they may cause gas), and high-fat foods such as potato chips or corn chips. Avoid snacks in the middle of the nights since awakening may become associated with hunger. If you have trouble with regurgitation, be especially careful to avid heavy meals and spices in the evening. Do not go to bed too hungry or too full. It may help to elevate you head with some pillows. Avoid Naps       Avoid naps, the sleep you obtain during the day takes away from you sleep need that night resulting in lighter, more restless sleep, difficulty falling asleep or early morning awakening.   If you must nap, keep it brief, and take the nap about 8 hours after arising. It is best to set an alarm to ensure you dont sleep more than 10-15 minutes. Limit Your Time in Bed        Restrict your sleep period to the average number of hours you have actually slept per night during the preceding week. Quality of sleep is important. Too much time in bed can decrease the quality on subsequent night and contribute to the maintenance of existing sleep problems. Dont lay in bed for extended times not sleep. If you arent asleep in about 15-20 minutes go ahead and get up. Do something outside the bedroom that is relaxing. When you feel sleepy (i.e., yawning, head bobbing, eyes closing, concentration decreasing, then return to bed. Dont confuse tiredness with sleepiness, they are different. Tiredness doesnt lead to sleep, only sleepiness does. Regular Sleep Schedule       Keep a regular time each day, 7 days a week, to get out of bed. Keeping a regular awaking time helps set your circadian rhythm set so that your body learns to sleep at the desired time. Use the attached form to develop a plan for improving you sleep hygiene. It will take time for you sleep to get back in line so once you begin your sleep hygiene plan, stick with if for at least 6-8 weeks. Planned Improvements of My Sleep Hygiene    Check Those  That Apply  _____ Avoid Caffeine 6-8 Hours Before Bedtime. I will not have caffeine after ________ PM.    ____ Avoid Nicotine Before Bedtime. I will not have a cigarette after _________ PM.    ______  Limit Alcohol Use. I will not have more than _______ drinks in the evening.    ______ Avoid Use of Sleeping Pills. (If you are currently using them regularly, all changes should be   medical supervised by your medical provider). ______ Do Exercise Regularly, But Not Within 2 Hours of Bedtime.  I ________________ for ____   minutes, on the following days ____________________________________________________    ______ Ensure your Bedroom is a Comfortable Temperature, Quiet, and Dark and Your   Mattress and Pillow are good. I will make the  following changes to my bedroom   ____________________________________________________________________________    ______ Do Take a Hot Bath 1-2 Hours Prior to Bedtime. I will take a hot bath about ______ PM.    ______ Eat a Light Snack at Bedtime but Avoid Large or Problematic Foods. I will eat     __________________  or _____________________ or __________________ before bed.    ______ Avoid Naps. I try not to nap, if I must, I will limit it to _______ minutes, about 8 hours after I   awoke and will use alarm to limit my nap time. ______ Limit Time In Bed. I have been sleeping on average ______ hours per night, therefore I will   limit my time in bed to _____ hours (the same number). If Im not asleep in about 15 to 20   minutes I will get up and not return to bed until Im sleepy.    ______ Stay on a Regular Sleep Schedule  I will get up at _______ AM, 7 days a week, no matter   how poorly I slept that night. Relaxation:  Diaphragmatic Breathing             _____________________________________________________________________________    1. Sit in a comfortable position    2. Place one hand on your stomach and the other on your chest    3. Try to breathe so that only your stomach rises and falls    As you inhale, concentrate on your chest remaining relatively still while your stomach rises. It may be helpful for you to imagine that your pants are too big and you need to push your stomach out to hold them up. When exhaling, allow your stomach to fall in and the air to fully escape. Inhale slowly. You may choose to hold the air in for about a second. Exhale slowly. Dont push the air out, but just let the natural pressure of your body slowly move it out.     It is normal for this healthy method of breathing challenging situations with others   Encourage others    Cognitive Coping:  Make a gratitude list  Brainstorm solutions   Lower your expectations of the situation   Keep an inspirational quote with you   Be flexible   Write a list of goals   Take a class Act opposite of negative feelings   Write a list of pros and cons for decisions   Reward or pamper yourself when successful   Write a list of strengths   Accept a challenge with a positive attitude    Tension Releasers:  Exercise or play sports   Catharsis (yelling in the bathroom, punching a punching bag)   Cry   Laugh    Physical:  Get enough sleep   Eat healthy foods   Get into a good routine   Eat a little chocolate   Limit caffeine   Deep/slow breathing    Spiritual:  [place] or meditate   Enjoy nature   Get involved in a worthy cause    Limit Setting:  Drop some involvement   Prioritize important tasks   Use assertive communication   Schedule time for yourself    The 2021 Elena Lira (NISHA) website also lists some coping skills, some that are positive and encourage mental health, and others that are destructive or used to avoid your problems.     The positive coping skills include:   Meditation and relaxation techniques  Having time to yourself   Physical activity or exercise   Reading   Spending time with friends   Finding humor   Spending time on your hobbies   Spirituality   Quality time with your pets   Getting a good nights sleep   Eating healthy    Negative coping skills include:  Drugs   Excessive alcohol   Self-mutilation   Ignoring or bottling up feelings   Taking sedatives   Taking stimulants   Working too much   Avoiding your problems   Denial    Aside from using the positive coping methods, the [address] website also suggests ten tips you can put to use to strengthen your mental state and build resilience to lifes stressors:   Build up your confidence  Accept compliments when they are given to you   Be sure to make time for your loved ones Give support to others when needed and accept support from others when needed   Create and stick to a realistic budget   Volunteer in your community   Find ways to manage your stress on a regular basis   Share your burdens with others, especially those who have been through the same things   Identify and address your shifting moods   Learn how to be at peace with yourself    While none of these tips are easy to follow, they are sure to provide you with the strength and resilience you need to navigate difficult times in your life (MHWW.org). More Coping Skills! · Exercise (running, walking, etc.). · Put on fake tattoos. · Write (poetry, stories, journal). · Scribble/doodle on paper. · Be with other people. · Watch a favorite TV show. · Post on [employer], and answer others' posts. · Go see a movie. · Do a word search or crossword puzzle. · Do schoolwork or read about something new and interesting. · Play a musical instrument. · Paint your nails, do make-up, or style your hair. · Sing. · Study the celio. · Punch a punching bag. · Cover yourself with Band-Aids instead of cutting. · Let yourself cry. · Take a nap (only if you are tired! ). · Take a hot shower or relaxing bath. · Play with a pet. · Go shopping. · Clean something. · Knit or sew. · Read a good book. · Listen to music. · Try some aromatherapy (candle, lotion, room spray). · Meditate. · Go somewhere very public. · Bake cookies. · Alphabetize your "[employer] / [place] / books. · Paint or draw. · Rip paper into itty-bitty pieces. · Shoot hoops, kick a ball. · Write a letter or send an email. · Plan your dream room (colors / furniture). · Hug a pillow or stuffed animal.  · Hyperfocus on something like a rock, hand, etc. (This is sensory mindfulness!)  · Dance. · Make hot chocolate, a milkshake, or a smoothie. · Play with modeling yadira or Play-Josue. · Build a pillow fort. · Go for a nice, long drive.   · Complete something you've been putting off. · Draw on yourself with a marker. · Take up a new hobby. · Look up recipes, cook a meal.  · Look at pretty things, like flowers or art. · Create or build something. · Frontier. · Make a list of blessings in your life. · Read the Bible. · Go to a friend's house. · Jump on a trampoline. · Watch an old, happy movie. · Contact a hotline / your therapist. Teodoro Raza can also dial the [facility] crisis line at 8-926-HOT-HELP (520-5006). · Talk to someone close to you. · Ride a bicycle. · Feed the ducks, birds, or squirrels. · Color. · Memorize a poem, play, or song. · Stretch. · Search for ridiculous things on the internet. · \"Shop\" online (without buying anything -- save that for when you feel better). · Color-coordinate your wardrobe. · Watch fish. · Make a CD or playlist of your favorite songs. · Play the \"15 minute game. \" (When you're frustrated by something - do something else for 15 minutes, then try again when time is up.)  · Plan your wedding / Sharlee Barrack / birthday party / another event. · Plant some seeds. · Hunt for your perfect home or car online. · Try to make as many words out of your full name as possible. · Sort through or edit your pictures. · Play with a balloon. · Give yourself a facial.  · Play with a favorite childhood toy. · Start collecting something. · Play video / computer games. · Clean up trash at your local park. · Try the Anxiety [employer] Depression [employer] of [place]'s online support group. You can find more information at https://adaa.org/adaa-online-support-group  · Text or call an old friend. · Write yourself an \"I love you because. [place] Burns \" letter. · Look up new words and use them. · Rearrange furniture. · Write a letter to someone that you may never send. · Smile at five people. · Play with a younger family member. · Go for a walk (with or without a friend). · Put a puzzle together. · Clean your room / closet.   · Try to do handstands, cartwheels, or backbends. · Yoga. · Teach your pet a new trick. · Learn a new language. · Move EVERYTHING in your room to a new spot. · Get together with friends and play Frisbee or a pick-up sport. · Cyrena Riling a friend or family member. · Search online for new songs / artists. · Make a list of goals for the week / month / year / next 5 years. · Perform a random act of kindness.

## 2019-07-10 ENCOUNTER — OFFICE VISIT (OUTPATIENT)
Dept: BEHAVIORAL/MENTAL HEALTH | Age: 74
End: 2019-07-10
Payer: MEDICARE

## 2019-07-10 DIAGNOSIS — F43.23 ADJUSTMENT DISORDER WITH MIXED ANXIETY AND DEPRESSED MOOD: Primary | ICD-10-CM

## 2019-07-10 DIAGNOSIS — R41.840 ATTENTION DISTURBANCE: ICD-10-CM

## 2019-07-10 PROCEDURE — 90837 PSYTX W PT 60 MINUTES: CPT | Performed by: COUNSELOR

## 2019-07-10 NOTE — PROGRESS NOTES
Adjustment disorder with mixed anxiety and depressed mood    2. Attention disturbance      Patient presented with even less mood disruption today than was evident at the time of her last contact. I do not have the impression that patient has begun taking her prescribed antidepressant medication, and it seems that much of patient's progress is due to behavioral means. Although patient was unable to locate her historical evaluation, based upon the information provided and my experience of her over several sessions, I am strongly inclined to believe patient's report of historical ADHD diagnosis. No formal measures have been pursued at this time due to difficulty adequately focusing patient's attention, although I am willing to complete them if requested. In the meantime, I am beginning some simple behavioral accommodations with patient to help her improve her functioning with regard to reported symptoms. I do think the patient may benefit from medical intervention for the symptoms as well, based upon the level of functional impairment described. P:    Discussed various factors related to the development and maintenance of  stress (including biological, cognitive, behavioral, and environmental factors), Discussed and set plan for behavioral activation, Discussed potential treatments for  ADHD, Discussed self-care (sleep, nutrition, rewarding activities, social support, exercise), Discussed and problem-solved barriers in adhering to behavioral change plan, Motivational Interviewing to increase patient confidence and compliance with adhering to behavioral change plan, Supportive techniques and Provided Psychoeducation re: ADHD    Patient Plan:  1) Review the attached information on sleep hygiene. Go through the worksheet at the end of it; are there things that make sense to try right now? If so, do them. 2) Make sure to eat regularly.  If you haven't eaten in 3-4 hours, grab at least a small snack, even if you

## 2019-07-17 ENCOUNTER — OFFICE VISIT (OUTPATIENT)
Dept: FAMILY MEDICINE CLINIC | Age: 74
End: 2019-07-17
Payer: MEDICARE

## 2019-07-17 VITALS
BODY MASS INDEX: 34.3 KG/M2 | WEIGHT: 193.6 LBS | SYSTOLIC BLOOD PRESSURE: 118 MMHG | DIASTOLIC BLOOD PRESSURE: 72 MMHG | HEART RATE: 74 BPM | HEIGHT: 63 IN

## 2019-07-17 DIAGNOSIS — F32.A DEPRESSION, UNSPECIFIED DEPRESSION TYPE: ICD-10-CM

## 2019-07-17 DIAGNOSIS — R73.09 ELEVATED GLUCOSE: Primary | ICD-10-CM

## 2019-07-17 PROCEDURE — 4040F PNEUMOC VAC/ADMIN/RCVD: CPT | Performed by: FAMILY MEDICINE

## 2019-07-17 PROCEDURE — 1036F TOBACCO NON-USER: CPT | Performed by: FAMILY MEDICINE

## 2019-07-17 PROCEDURE — 1123F ACP DISCUSS/DSCN MKR DOCD: CPT | Performed by: FAMILY MEDICINE

## 2019-07-17 PROCEDURE — G8427 DOCREV CUR MEDS BY ELIG CLIN: HCPCS | Performed by: FAMILY MEDICINE

## 2019-07-17 PROCEDURE — 1090F PRES/ABSN URINE INCON ASSESS: CPT | Performed by: FAMILY MEDICINE

## 2019-07-17 PROCEDURE — 3017F COLORECTAL CA SCREEN DOC REV: CPT | Performed by: FAMILY MEDICINE

## 2019-07-17 PROCEDURE — G8417 CALC BMI ABV UP PARAM F/U: HCPCS | Performed by: FAMILY MEDICINE

## 2019-07-17 PROCEDURE — G8400 PT W/DXA NO RESULTS DOC: HCPCS | Performed by: FAMILY MEDICINE

## 2019-07-17 PROCEDURE — 99213 OFFICE O/P EST LOW 20 MIN: CPT | Performed by: FAMILY MEDICINE

## 2019-07-17 RX ORDER — BUPROPION HYDROCHLORIDE 150 MG/1
150 TABLET, EXTENDED RELEASE ORAL 2 TIMES DAILY
Qty: 60 TABLET | Refills: 3 | Status: SHIPPED | OUTPATIENT
Start: 2019-07-17 | End: 2020-01-30

## 2019-07-17 RX ORDER — FLUOXETINE HYDROCHLORIDE 20 MG/1
20 CAPSULE ORAL DAILY
Qty: 30 CAPSULE | Refills: 5 | Status: SHIPPED | OUTPATIENT
Start: 2019-07-17 | End: 2019-09-11

## 2019-07-17 NOTE — PROGRESS NOTES
Patient not interested in colonoscopy at this time.
penicillins; and sulfur. She  reports that she has never smoked. She has never used smokeless tobacco.      Objective:    Vitals:    07/17/19 1520   BP: 118/72   Site: Right Upper Arm   Position: Sitting   Cuff Size: Large Adult   Pulse: 74   Weight: 193 lb 9.6 oz (87.8 kg)   Height: 5' 3\" (1.6 m)     Body mass index is 34.29 kg/m². Obese  female, healthy-appearing, alert, cooperative and in no distress. Neck supple. No adenopathy. Thyroid symmetric, normal size. Chest:  Normal expansion. Clear to auscultation. No rales, rhonchi, or wheezing. Heart sounds are normal.  Regular rate and rhythm without murmur, gallop or rub. Lower extremities have no edema. Affect is anxious. Thought processes are logical. There is no evidence of paranoia or delusions. Responses to questions are appropriate. Dress and grooming are appropriate. Assessment and Plan:    1. Depression, unspecified depression type  As above, the patient is not certain if she has had any improvement since increasing her dose of Fluoxetine. Her affect seems brighter today. I have recommended that she continue Fluoxetine:  - FLUoxetine (PROZAC) 20 MG capsule; Take 1 capsule by mouth daily  Dispense: 30 capsule; Refill: 5    I will also have her add Wellbutrin:  - buPROPion (WELLBUTRIN SR) 150 MG extended release tablet; Take 1 tablet by mouth 2 times daily  Dispense: 60 tablet; Refill: 3    I have asked her to return in 3 months for re-evaluation, or sooner prn.    2. Elevated glucose  Labs were ordered to be done prior to her return visit in 3 months:  - Comprehensive Metabolic Panel;  Future  - Hemoglobin A1C; Future          (Please note that portions of this note were completed with a voice-recognition program. Efforts were made to edit the dictation but occasionally words are mis-transcribed.)

## 2019-07-19 ENCOUNTER — OFFICE VISIT (OUTPATIENT)
Dept: OPHTHALMOLOGY | Age: 74
End: 2019-07-19
Payer: MEDICARE

## 2019-07-19 DIAGNOSIS — H02.831 DERMATOCHALASIS OF BOTH UPPER EYELIDS: ICD-10-CM

## 2019-07-19 DIAGNOSIS — H25.813 COMBINED FORMS OF AGE-RELATED CATARACT OF BOTH EYES: Primary | ICD-10-CM

## 2019-07-19 DIAGNOSIS — H43.813 DEGENERATION OF POSTERIOR VITREOUS BODY OF BOTH EYES: ICD-10-CM

## 2019-07-19 DIAGNOSIS — H02.834 DERMATOCHALASIS OF BOTH UPPER EYELIDS: ICD-10-CM

## 2019-07-19 PROCEDURE — 1090F PRES/ABSN URINE INCON ASSESS: CPT | Performed by: OPHTHALMOLOGY

## 2019-07-19 PROCEDURE — 1123F ACP DISCUSS/DSCN MKR DOCD: CPT | Performed by: OPHTHALMOLOGY

## 2019-07-19 PROCEDURE — G8427 DOCREV CUR MEDS BY ELIG CLIN: HCPCS | Performed by: OPHTHALMOLOGY

## 2019-07-19 PROCEDURE — 99204 OFFICE O/P NEW MOD 45 MIN: CPT | Performed by: OPHTHALMOLOGY

## 2019-07-19 PROCEDURE — G8417 CALC BMI ABV UP PARAM F/U: HCPCS | Performed by: OPHTHALMOLOGY

## 2019-07-19 PROCEDURE — G8400 PT W/DXA NO RESULTS DOC: HCPCS | Performed by: OPHTHALMOLOGY

## 2019-07-19 PROCEDURE — 3017F COLORECTAL CA SCREEN DOC REV: CPT | Performed by: OPHTHALMOLOGY

## 2019-07-19 PROCEDURE — 4040F PNEUMOC VAC/ADMIN/RCVD: CPT | Performed by: OPHTHALMOLOGY

## 2019-07-19 PROCEDURE — 92250 FUNDUS PHOTOGRAPHY W/I&R: CPT | Performed by: OPHTHALMOLOGY

## 2019-07-19 PROCEDURE — 1036F TOBACCO NON-USER: CPT | Performed by: OPHTHALMOLOGY

## 2019-07-19 RX ORDER — TROPICAMIDE 10 MG/ML
1 SOLUTION/ DROPS OPHTHALMIC ONCE
Status: COMPLETED | OUTPATIENT
Start: 2019-07-19 | End: 2019-07-19

## 2019-07-19 RX ORDER — PHENYLEPHRINE HYDROCHLORIDE 100 MG/ML
1 SOLUTION/ DROPS OPHTHALMIC ONCE
Status: COMPLETED | OUTPATIENT
Start: 2019-07-19 | End: 2019-07-19

## 2019-07-19 RX ADMIN — PHENYLEPHRINE HYDROCHLORIDE 1 DROP: 100 SOLUTION/ DROPS OPHTHALMIC at 10:52

## 2019-07-19 RX ADMIN — TROPICAMIDE 1 DROP: 10 SOLUTION/ DROPS OPHTHALMIC at 10:52

## 2019-07-19 ASSESSMENT — TONOMETRY
OD_IOP_MMHG: 20
IOP_METHOD: NON-CONTACT AIR PUFF
OS_IOP_MMHG: 24

## 2019-07-19 ASSESSMENT — SLIT LAMP EXAM - LIDS
COMMENTS: MILD BLEPHARITIS. MILD MGD, 2+ DERMATOCHALASIS - UPPER LID
COMMENTS: MILD BLEPHARITIS. MILD MGD, 2+ DERMATOCHALASIS - UPPER LID

## 2019-07-19 ASSESSMENT — VISUAL ACUITY
CORRECTION_TYPE: GLASSES
METHOD: SNELLEN - LINEAR
OS_CC: 20/30-

## 2019-07-19 ASSESSMENT — ENCOUNTER SYMPTOMS
EYES NEGATIVE: 0
GASTROINTESTINAL NEGATIVE: 0
ALLERGIC/IMMUNOLOGIC NEGATIVE: 0
RESPIRATORY NEGATIVE: 0

## 2019-07-19 ASSESSMENT — CONF VISUAL FIELD
OS_NORMAL: 1
OD_NORMAL: 1

## 2019-07-19 NOTE — PROGRESS NOTES
Not recorded         Confrontational Visual Fields     Visual Fields       Right Left     Full Full               Extraocular Movement     Extraocular Movement       Right Left     Full, Ortho Full, Ortho               Not recorded          Past Medical History:   Diagnosis Date    ADHD (attention deficit hyperactivity disorder)     Dysphagia     schatzki ring    Hyperglycemia     Vitamin D deficiency           Current Outpatient Medications:     FLUoxetine (PROZAC) 20 MG capsule, Take 1 capsule by mouth daily, Disp: 30 capsule, Rfl: 5    buPROPion (WELLBUTRIN SR) 150 MG extended release tablet, Take 1 tablet by mouth 2 times daily, Disp: 60 tablet, Rfl: 3    Cholecalciferol (VITAMIN D3) 5000 UNITS TABS, Take 5,000 Units by mouth daily, Disp: , Rfl:     co-enzyme Q-10 50 MG capsule, Take 50 mg by mouth daily, Disp: , Rfl:     magnesium oxide (MAG-OX) 400 MG tablet, Take 400 mg by mouth daily, Disp: , Rfl:     Omega-3 300 MG CAPS, Take 300 mg by mouth daily, Disp: , Rfl:      Allergies   Allergen Reactions    Asa [Aspirin]      Nausea and Vomiting    Celexa [Citalopram Hydrobromide]      dizziness    Penicillins Nausea Only    Sulfur Hives        IMPRESSION:  1. Combined forms of age-related cataract of both eyes    2. 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 Dermatochalasis of both upper eyelids        PLAN:    1. Combined forms of age-related cataract of both eyes    Pt states that they are having significant difficulty performing  Activities of Daily Living such as reading, watching television,  and driving. Counseled pt regarding Visually Significant  Cataracts and various treatments which may  include; Observation, Manifest Refraction, and  Cataract Surgery. Advised pt to exercise caution while operating a   motor vehicle or performing other critical visual tasks.     Thoroughly counseled pt regarding the significant  risks of Cataract Surgery including, but not limited to:    - Additional Surgery and/or the need to    refer to another specialist.  - Heart or Lung problems  - Long term eye pain  - Eyelid drooping or other eyelid problems  - Endophthalmitis  - Severe bleeding inside the eye  - Worsening of Medical Problems   - Intraocular Inflammation  - Intraocular Lens Dislocation  - Injury to the Iris (Iridodialysis, Prolapse)  - Macular Edema  - Permanent Pupillary Abnormalities  - Refractive Surprise  - The need for permanent vision correction      such as eyeglasses or contact lenses or      keratorefractive surgery for distance     vision and/or for near vision.  - A tear and/or detachment of the Retina    Pt request visual rehabilitation by having   cataract surgery. D/W pt various IOL options including:    - Limbal Relaxing Incisions  - Monofocal Lenses  - Premium Lenses   - Toric Lenses    Counseled pt regarding pre-operative medications and  post-operative instructions. Dilated Pupil Size:    OD: 7  OS: 7    Malyugin Ring:  No    Trypan Blue: Possible    MiLoop: Possible    Planned Surgery:  Right Eye First    2. 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 Follow. 3. Dermatochalasis of both upper eyelids    Follow with serial exams.     Electronically signed by Doug Mcmullen MD on 7/19/2019 at 10:48 AM

## 2019-07-30 ENCOUNTER — OFFICE VISIT (OUTPATIENT)
Dept: BEHAVIORAL/MENTAL HEALTH | Age: 74
End: 2019-07-30
Payer: MEDICARE

## 2019-07-30 ENCOUNTER — OFFICE VISIT (OUTPATIENT)
Dept: OPHTHALMOLOGY | Age: 74
End: 2019-07-30
Payer: MEDICAID

## 2019-07-30 DIAGNOSIS — F90.9 ATTENTION DEFICIT HYPERACTIVITY DISORDER (ADHD), UNSPECIFIED ADHD TYPE: Primary | ICD-10-CM

## 2019-07-30 DIAGNOSIS — H25.813 COMBINED FORMS OF AGE-RELATED CATARACT OF BOTH EYES: ICD-10-CM

## 2019-07-30 DIAGNOSIS — F43.23 ADJUSTMENT DISORDER WITH MIXED ANXIETY AND DEPRESSED MOOD: ICD-10-CM

## 2019-07-30 PROCEDURE — 92136 OPHTHALMIC BIOMETRY: CPT | Performed by: OPHTHALMOLOGY

## 2019-07-30 PROCEDURE — 90837 PSYTX W PT 60 MINUTES: CPT | Performed by: COUNSELOR

## 2019-07-30 NOTE — PROGRESS NOTES
Behavioral Health Consultation/Psychotherapy Note  Sara Fenton. Masoud Juan Psy.D. Visit Date:  7/30/2019    Patient:  Karely Mckay  YOB: 1945  Chief Complaint:  Follow-up    Duration of session:  60 minutes      S:     Patient presented alone for appointment, and engaged readily. Patient was again voluble throughout appointment, but with less physical restlessness. Speech was somewhat less tangential, although stream of consciousness thinking was still present. Patient reported that she continues to experience an increased overall stability of mood. 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 Patient reported feeling overwhelmed by the details of these arrangements, including an accumulation of clutter and bric-a-brac throughout her home. Patient discussed potential accommodations to assist her with organizing and packing belongings. Patient presented with a copy of the evaluation completed at [facility] Medicine, and discussed the results. Although patient was not able to be formally diagnosed with ADHD due to difficulty confirming symptom presentation in childhood under the stricter DSM-IV criteria, the evaluating psychiatrist did note multiple areas of concern in patient's executive functioning which fit with a diagnosis of ADHD. Psychiatrist additionally recommended that patient be treated presumptively for ADHD. The psychiatrist additionally noted symptoms of mild anxiety and depression, which are not unusual in the context of ADHD. Patient discussed how to use the information in the assessment to help herself accommodate her symptoms and advocate with her medical providers. Patient reviewed available resources for learning behavioral management of ADHD symptoms.       O:    Appearance    Patient presents as alert, oriented, and cooperative  Appetite normal  Sleep disturbance Yes  Loss of pleasure No  Speech    hyperverbal,

## 2019-08-12 ENCOUNTER — OFFICE VISIT (OUTPATIENT)
Dept: BEHAVIORAL/MENTAL HEALTH | Age: 74
End: 2019-08-12
Payer: MEDICARE

## 2019-08-12 DIAGNOSIS — F90.9 ATTENTION DEFICIT HYPERACTIVITY DISORDER (ADHD), UNSPECIFIED ADHD TYPE: Primary | ICD-10-CM

## 2019-08-12 DIAGNOSIS — F43.23 ADJUSTMENT DISORDER WITH MIXED ANXIETY AND DEPRESSED MOOD: ICD-10-CM

## 2019-08-12 PROCEDURE — 90837 PSYTX W PT 60 MINUTES: CPT | Performed by: COUNSELOR

## 2019-08-12 NOTE — PROGRESS NOTES
talking to you, listen. 3) Aim for at least 30-40 cumulative minutes of movement each day. This doesn't have to be all at once, or a workout. Just stretch or dance to a song you like. If you've been sitting for more than an hour or two, get up and move around for a few minutes. 4) Review the attached list of coping skills. Try 1-2 a week; do more if you're feeling frisky! 5) Review the attached information on deep breathing. Use this to help manage intense emotions, or just to relax. Practice this daily, even if you're not feeling particularly stressed. 6) Are there things that you've stopped doing because of stress or your mood? If so, make note of them and consider some ways to reincorporate them into your life. This is also something that can be discussed in counseling. 7) Remember to be kind to yourself. This is a challenging experience, and you're doing the best you can. 8) Check out www.[employer] for more information on ADHD accommodations. .. Patient to return in two weeks for follow-up. All questions about treatment plan answered. Pt instructed to call the crisis line and/or proceed to emergency room if suicidal or homicidal ideations occur outside of clinic hours and crisis management skills do not provide relief. Pt stated understanding and is agreeable to treatment and crisis plan.     (Please note that portions of this note were completed with a voice recognition program. Efforts were made to edit the dictations but occasionally words are mis-transcribed.)      Provider Signature:  Electronically signed by Andre Beltrán PSYD on 8/12/2019 at 12:49 PM

## 2019-08-12 NOTE — PATIENT INSTRUCTIONS
1) Review the attached information on sleep hygiene. Go through the worksheet at the end of it; are there things that make sense to try right now? If so, do them. 2) Make sure to eat regularly. If you haven't eaten in 3-4 hours, grab at least a small snack, even if you have no appetite. If your stomach is talking to you, listen. 3) Aim for at least 30-40 cumulative minutes of movement each day. This doesn't have to be all at once, or a workout. Just stretch or dance to a song you like. If you've been sitting for more than an hour or two, get up and move around for a few minutes. 4) Review the attached list of coping skills. Try 1-2 a week; do more if you're feeling frisky! 5) Review the attached information on deep breathing. Use this to help manage intense emotions, or just to relax. Practice this daily, even if you're not feeling particularly stressed. 6) Are there things that you've stopped doing because of stress or your mood? If so, make note of them and consider some ways to reincorporate them into your life. This is also something that can be discussed in counseling. 7) Remember to be kind to yourself. This is a challenging experience, and you're doing the best you can. 8) Check out www.[employer] for more information on ADHD accommodations.

## 2019-08-26 ENCOUNTER — OFFICE VISIT (OUTPATIENT)
Dept: BEHAVIORAL/MENTAL HEALTH | Age: 74
End: 2019-08-26
Payer: MEDICARE

## 2019-08-26 DIAGNOSIS — F43.23 ADJUSTMENT DISORDER WITH MIXED ANXIETY AND DEPRESSED MOOD: ICD-10-CM

## 2019-08-26 DIAGNOSIS — F90.9 ATTENTION DEFICIT HYPERACTIVITY DISORDER (ADHD), UNSPECIFIED ADHD TYPE: Primary | ICD-10-CM

## 2019-08-26 PROCEDURE — 90834 PSYTX W PT 45 MINUTES: CPT | Performed by: COUNSELOR

## 2019-08-26 NOTE — PROGRESS NOTES
Behavioral Health Consultation/Psychotherapy Note  Simon Cook. Blaze Felipe Psy.D. Visit Date:  8/26/2019    Patient:  Vijaya Williamson  YOB: 1945  Chief Complaint:  Follow-up    Duration of session:  42 minutes      S:     Patient presented alone for appointment but was tardy. Patient engaged readily, but was notably more distractible today. Patient reported that she continues to take prescribed Wellbutrin, but is only compliant with 1 dose per day due to experiencing side effects when taking both prescribed doses. Patient was encouraged to discuss this with her PCP. Patient reported that she was scheduled to move at the beginning of September, but that this is likely to be delayed due to difficulties arranging logistics and completing packing. Patient reported increased stress associated with needing to pay rent on her new apartment beginning 9/1 despite not being able to move there yet. Patient reviewed information she had recently read regarding ADHD, and discussed information on practical accommodations to assist her with organization. Patient processed recent family stress, and discussed ways to reduce her entanglement in order to increase her ability to focus on her own needs and factors associated with the move. O:    Appearance    Patient presents as alert, oriented, and cooperative  Appetite normal  Sleep disturbance minor improvement  Loss of pleasure No  Speech    clear and understandable but highly voluble and interrupting  Mood    Anxious  Affect    agitation  Thought Process    highly tangential but better able to redirect self back to her original topic; noted difficulty considering the viewpoint of others  Insight    Fair  Judgment    Intact  Memory    remote memory intact, recent memory impaired  Suicide Assessment    no suicidal ideation      A:    1. Attention deficit hyperactivity disorder (ADHD), unspecified ADHD type    2.  Adjustment disorder with mixed anxiety and depressed mood      Patient continues to take Wellbutrin, but is not fully compliant due to reported side effects. Patient reported that she is taking her morning dose consistently, but has stopped taking her second prescribed dose. Patient is showing some improvement in her ability to focus, but is still demonstrating significant cognitive symptoms. As patient is on a lower dose of medication, this is not unexpected. As patient is not at her full therapeutic dose, I have encouraged her to follow up with her PCP regarding her concerns. Patient has indicated that she continues not to take prescribed Prozac. Patient continues to work toward moving out of state, but continues to struggle with logistics. Patient is hoping to have completed her move within the next 2 weeks, but has currently scheduled a follow-up for further contact in the event that this does not happen as planned. If patient does complete her move, she will contact to cancel and arrange appropriate referrals for continuity of care.       P:    Provided education on the use of medication to treat  stress and ADHD, Discussed various factors related to the development and maintenance of  stress and ADHD (including biological, cognitive, behavioral, and environmental factors), Discussed and set plan for behavioral activation, Discussed self-care (sleep, nutrition, rewarding activities, social support, exercise), Discussed benefits of referral for specialty care, Discussed and problem-solved barriers in adhering to behavioral change plan, Motivational Interviewing to increase patient confidence and compliance with adhering to behavioral change plan, Discussed potential barriers to change, Supportive techniques, Provided Psychoeducation re: ADHD, CBT to target Cognitive distortions, Identified maladaptive thoughts, Provided pt book recommendation and Engaged in review of behavioral interventions for ADHD accommodation    Patient Plan:  1) Review the are mis-transcribed.)      Provider Signature:  Electronically signed by Jonathan Hogan PSYD on 8/26/2019 at 10:49 AM

## 2019-09-11 ENCOUNTER — OFFICE VISIT (OUTPATIENT)
Dept: FAMILY MEDICINE CLINIC | Age: 74
End: 2019-09-11
Payer: MEDICARE

## 2019-09-11 ENCOUNTER — OFFICE VISIT (OUTPATIENT)
Dept: BEHAVIORAL/MENTAL HEALTH | Age: 74
End: 2019-09-11
Payer: MEDICARE

## 2019-09-11 VITALS
RESPIRATION RATE: 16 BRPM | SYSTOLIC BLOOD PRESSURE: 132 MMHG | BODY MASS INDEX: 32.92 KG/M2 | DIASTOLIC BLOOD PRESSURE: 74 MMHG | HEART RATE: 70 BPM | TEMPERATURE: 97.6 F | OXYGEN SATURATION: 98 % | HEIGHT: 63 IN | WEIGHT: 185.8 LBS

## 2019-09-11 DIAGNOSIS — R05.9 COUGH: ICD-10-CM

## 2019-09-11 DIAGNOSIS — Z01.818 PRE-OP TESTING: Primary | ICD-10-CM

## 2019-09-11 DIAGNOSIS — F90.9 ATTENTION DEFICIT HYPERACTIVITY DISORDER (ADHD), UNSPECIFIED ADHD TYPE: Primary | ICD-10-CM

## 2019-09-11 DIAGNOSIS — F43.23 ADJUSTMENT DISORDER WITH MIXED ANXIETY AND DEPRESSED MOOD: ICD-10-CM

## 2019-09-11 PROCEDURE — 1090F PRES/ABSN URINE INCON ASSESS: CPT | Performed by: FAMILY MEDICINE

## 2019-09-11 PROCEDURE — 4040F PNEUMOC VAC/ADMIN/RCVD: CPT | Performed by: FAMILY MEDICINE

## 2019-09-11 PROCEDURE — 99213 OFFICE O/P EST LOW 20 MIN: CPT | Performed by: FAMILY MEDICINE

## 2019-09-11 PROCEDURE — 1036F TOBACCO NON-USER: CPT | Performed by: FAMILY MEDICINE

## 2019-09-11 PROCEDURE — 93000 ELECTROCARDIOGRAM COMPLETE: CPT | Performed by: FAMILY MEDICINE

## 2019-09-11 PROCEDURE — G8427 DOCREV CUR MEDS BY ELIG CLIN: HCPCS | Performed by: FAMILY MEDICINE

## 2019-09-11 PROCEDURE — G8417 CALC BMI ABV UP PARAM F/U: HCPCS | Performed by: FAMILY MEDICINE

## 2019-09-11 PROCEDURE — 90834 PSYTX W PT 45 MINUTES: CPT | Performed by: COUNSELOR

## 2019-09-11 PROCEDURE — G8400 PT W/DXA NO RESULTS DOC: HCPCS | Performed by: FAMILY MEDICINE

## 2019-09-11 PROCEDURE — 1123F ACP DISCUSS/DSCN MKR DOCD: CPT | Performed by: FAMILY MEDICINE

## 2019-09-11 PROCEDURE — 3017F COLORECTAL CA SCREEN DOC REV: CPT | Performed by: FAMILY MEDICINE

## 2019-09-11 RX ORDER — BENZONATATE 100 MG/1
100-200 CAPSULE ORAL 3 TIMES DAILY PRN
Qty: 60 CAPSULE | Refills: 0 | Status: SHIPPED | OUTPATIENT
Start: 2019-09-11 | End: 2019-09-18

## 2019-09-11 RX ORDER — ALBUTEROL SULFATE 90 UG/1
2 AEROSOL, METERED RESPIRATORY (INHALATION) EVERY 4 HOURS PRN
Qty: 1 INHALER | Refills: 1 | Status: SHIPPED | OUTPATIENT
Start: 2019-09-11 | End: 2020-01-30

## 2019-09-11 ASSESSMENT — ENCOUNTER SYMPTOMS
SHORTNESS OF BREATH: 0
CHEST TIGHTNESS: 0
EYE DISCHARGE: 0
BLOOD IN STOOL: 0
VOMITING: 0
ABDOMINAL PAIN: 0
EYE ITCHING: 0
COUGH: 1
SINUS PRESSURE: 0
SORE THROAT: 0
NAUSEA: 0
SINUS PAIN: 0

## 2019-09-11 NOTE — PROGRESS NOTES
Behavioral Health Consultation/Psychotherapy Note  Yasmin Phelan. Eliel Zaidi Psy.D. Visit Date:  9/11/2019    Patient:  Peri Wright  YOB: 1945  Chief Complaint:  Follow-up    Duration of session:  47 minutes      S:     Patient presented alone for appointment, but was again tardy. Patient engaged readily, but continued to demonstrate elevated distractibility. Patient reported that she continues to take prescribed Wellbutrin, but is not consistently compliant with full dosing due to side effects. Patient was encouraged to discuss this with her PCP at an appointment scheduled later that day. Patient discussed her progress in moving out of state, and expressed frustration that she had not been able to complete her move yet. Patient reported having significant difficulty focusing on packing, as well as that she is finding it difficult to discard various belongings due to the perceived invested value. Patient reported that she had been working with members of her [employer] to help her in remaining on task, and that this was beneficial, but that she felt self-conscious about asking them to do more of the same for her. Patient verbalized her desire to organize her space herself, and then request friends to come help pack what she had sorted. Patient discussed whether this was a realistic expectation, and if this might further prolong her from moving. Patient discussed options for further follow-up, as well as assistance arranging continuity of care subsequent to her move.       O:    Appearance    Patient presents as alert, oriented, and cooperative  Appetite normal  Sleep disturbance some increased disturbance due to elevated stress  Loss of pleasure No  Speech    clear and understandable but highly voluble and interrupting  Mood    Anxious  Affect    agitation  Thought Process    highly tangential with evidence of cognitive distortions; continued difficulty considering the viewpoint of others  Insight

## 2019-09-11 NOTE — PROGRESS NOTES
Hospital       Current Outpatient Medications   Medication Sig Dispense Refill    buPROPion (WELLBUTRIN SR) 150 MG extended release tablet Take 1 tablet by mouth 2 times daily (Patient taking differently: Take 150 mg by mouth daily ) 60 tablet 3    Cholecalciferol (VITAMIN D3) 5000 UNITS TABS Take 5,000 Units by mouth daily      co-enzyme Q-10 50 MG capsule Take 50 mg by mouth daily      magnesium oxide (MAG-OX) 400 MG tablet Take 500 mg by mouth daily       Omega-3 300 MG CAPS Take 300 mg by mouth daily       No current facility-administered medications for this visit. She is allergic to asa [aspirin]; celexa [citalopram hydrobromide]; penicillins; sulfur; and wasp venom. She  reports that she has never smoked. She has never used smokeless tobacco..    Review of Systems:  Review of Systems   Constitutional: Positive for appetite change. Negative for chills and fever. Appetite decreased on Wellbutrin   HENT: Negative for congestion, ear pain, sinus pressure, sinus pain and sore throat. Eyes: Negative for discharge and itching. Respiratory: Positive for cough. Negative for chest tightness and shortness of breath. Cardiovascular: Negative for chest pain, palpitations and leg swelling. Gastrointestinal: Negative for abdominal pain, blood in stool, nausea and vomiting. Endocrine: Negative for polydipsia and polyuria. Genitourinary: Negative for difficulty urinating and hematuria. Musculoskeletal: Positive for joint swelling. Negative for arthralgias. Occasional swelling in left knee after exercise   Skin: Negative for rash and wound. Neurological: Negative for dizziness, seizures and headaches. Hematological: Negative for adenopathy. Does not bruise/bleed easily. Psychiatric/Behavioral: Negative for dysphoric mood. The patient is not nervous/anxious.          Objective:    Vitals:    09/11/19 1331   BP: 132/74   Pulse: 70   Resp: 16   Temp: 97.6 °F (36.4 °C)   SpO2: 98%

## 2020-01-20 ENCOUNTER — OFFICE VISIT (OUTPATIENT)
Dept: OPHTHALMOLOGY | Age: 75
End: 2020-01-20
Payer: MEDICARE

## 2020-01-20 PROCEDURE — 1036F TOBACCO NON-USER: CPT | Performed by: OPHTHALMOLOGY

## 2020-01-20 PROCEDURE — 1123F ACP DISCUSS/DSCN MKR DOCD: CPT | Performed by: OPHTHALMOLOGY

## 2020-01-20 PROCEDURE — G8417 CALC BMI ABV UP PARAM F/U: HCPCS | Performed by: OPHTHALMOLOGY

## 2020-01-20 PROCEDURE — 1090F PRES/ABSN URINE INCON ASSESS: CPT | Performed by: OPHTHALMOLOGY

## 2020-01-20 PROCEDURE — 3017F COLORECTAL CA SCREEN DOC REV: CPT | Performed by: OPHTHALMOLOGY

## 2020-01-20 PROCEDURE — G8427 DOCREV CUR MEDS BY ELIG CLIN: HCPCS | Performed by: OPHTHALMOLOGY

## 2020-01-20 PROCEDURE — G8400 PT W/DXA NO RESULTS DOC: HCPCS | Performed by: OPHTHALMOLOGY

## 2020-01-20 PROCEDURE — 4040F PNEUMOC VAC/ADMIN/RCVD: CPT | Performed by: OPHTHALMOLOGY

## 2020-01-20 PROCEDURE — 99214 OFFICE O/P EST MOD 30 MIN: CPT | Performed by: OPHTHALMOLOGY

## 2020-01-20 PROCEDURE — G8484 FLU IMMUNIZE NO ADMIN: HCPCS | Performed by: OPHTHALMOLOGY

## 2020-01-20 PROCEDURE — 99211 OFF/OP EST MAY X REQ PHY/QHP: CPT

## 2020-01-20 RX ORDER — TROPICAMIDE 10 MG/ML
1 SOLUTION/ DROPS OPHTHALMIC ONCE
Status: COMPLETED | OUTPATIENT
Start: 2020-01-20 | End: 2020-01-21

## 2020-01-20 RX ORDER — TROPICAMIDE 10 MG/ML
1 SOLUTION/ DROPS OPHTHALMIC ONCE
Status: CANCELLED | OUTPATIENT
Start: 2020-01-20 | End: 2020-01-20

## 2020-01-20 RX ORDER — PHENYLEPHRINE HYDROCHLORIDE 100 MG/ML
1 SOLUTION/ DROPS OPHTHALMIC ONCE
Status: CANCELLED | OUTPATIENT
Start: 2020-01-20 | End: 2020-01-20

## 2020-01-20 RX ORDER — PHENYLEPHRINE HYDROCHLORIDE 100 MG/ML
1 SOLUTION/ DROPS OPHTHALMIC ONCE
Status: COMPLETED | OUTPATIENT
Start: 2020-01-20 | End: 2020-01-21

## 2020-01-20 ASSESSMENT — CONF VISUAL FIELD
OS_NORMAL: 1
OD_NORMAL: 1

## 2020-01-20 ASSESSMENT — ENCOUNTER SYMPTOMS
GASTROINTESTINAL NEGATIVE: 0
EYES NEGATIVE: 0
ALLERGIC/IMMUNOLOGIC NEGATIVE: 0
RESPIRATORY NEGATIVE: 0

## 2020-01-20 ASSESSMENT — TONOMETRY
OD_IOP_MMHG: 19
IOP_METHOD: NON-CONTACT AIR PUFF
OS_IOP_MMHG: 15

## 2020-01-20 ASSESSMENT — SLIT LAMP EXAM - LIDS
COMMENTS: NORMAL
COMMENTS: NORMAL

## 2020-01-20 ASSESSMENT — VISUAL ACUITY
OS_CC+: -1
METHOD: SNELLEN - LINEAR
OD_CC+: -1
OS_CC: 20/20

## 2020-01-20 NOTE — PROGRESS NOTES
Shannan laird 69 y.o. female here for a complete eye exam.      Chief Complaint   Patient presents with    Cataract    Pre-op Exam       HPI     Cataract     In both eyes. Associated symptoms include blurred vision, glare and haloes. Context:  night driving and driving. Treatments tried include no treatments and glasses. Comments     Pt presents today for evaluation for Cataract surgery OU .                 ROS     Positive for: HENT    Negative for: Constitutional, Gastrointestinal, Neurological, Skin, Genitourinary, Musculoskeletal, Endocrine, Cardiovascular, Eyes, Respiratory, Psychiatric, Allergic/Imm, Heme/Lymph          Main Ophthalmology Exam     Slit Lamp Exam       Right Left    Lids/Lashes Normal Normal    Conjunctiva/Sclera White and quiet White and quiet    Cornea no guttata no guttata    Anterior Chamber Deep and quiet Deep and quiet    Iris Round and reactive; 7mm pupil dilation Round and reactive; 7mm pupil dilation    Lens 2+ Cortical cataract, 2+ Nuclear sclerosis 2+ Cortical cataract, 2+ Nuclear sclerosis    Vitreous Normal Normal          Fundus Exam       Right Left    Disc Normal Normal    C/D Ratio Vertical 0.45 0.45    Macula Normal     Vessels Normal     Periphery Normal                    Tonometry     Tonometry (Non-contact air puff, 5:11 PM)       Right Left    Pressure 19 15              Visual Acuity     Visual Acuity (Snellen - Linear)       Right Left    Dist cc 20/20 -1 20/20 -1   BAT  OD:  20/25-2  OS:  20/30-2               Pupils     Pupils       Pupils    Right PERRL    Left PERRL               Confrontational Visual Fields     Visual Fields       Right Left     Full Full               Extraocular Movement     Extraocular Movement       Right Left     Full, Ortho Full, Ortho               Not recorded          Past Medical History:   Diagnosis Date    ADHD (attention deficit hyperactivity disorder)     Dysphagia     schatzki ring    Hyperglycemia    

## 2020-01-21 RX ADMIN — TROPICAMIDE 1 DROP: 10 SOLUTION/ DROPS OPHTHALMIC at 08:11

## 2020-01-21 RX ADMIN — PHENYLEPHRINE HYDROCHLORIDE 1 DROP: 100 SOLUTION/ DROPS OPHTHALMIC at 08:10

## 2020-01-27 ENCOUNTER — OFFICE VISIT (OUTPATIENT)
Dept: BEHAVIORAL/MENTAL HEALTH | Age: 75
End: 2020-01-27
Payer: COMMERCIAL

## 2020-01-27 PROCEDURE — 90834 PSYTX W PT 45 MINUTES: CPT | Performed by: COUNSELOR

## 2020-01-27 NOTE — PROGRESS NOTES
Behavioral Health Consultation/Psychotherapy Note  Alem Hanson Psy.D. Visit Date:  1/27/2020    Patient:  Ihsan Gunderson  YOB: 1945  Chief Complaint:  Follow-up    Duration of session:  44 minutes      S:     Patient presented alone and on time for appointment. Patient engaged readily, but continues to demonstrate elevated distractibility. Patient reported that she had stopped all psychiatric medication following her last contact. Patient discussed her move out of state, and processed the impact her mood and outlook. Patient reported that she is back in the area to attend to some medical matters subsequent to insurance issues in her new home in [place]. Patient discussed her confusion regarding the intentions of her landlord pertaining to a possible relationship, and and discussed her relationship history. Patient processed her feelings regarding the flirtation. Patient discussed options for further follow-up. O:    Appearance    Patient presents as alert, oriented, and cooperative  Appetite normal  Sleep disturbance some increased disturbance due to elevated stress  Loss of pleasure No  Speech    clear and understandable but highly voluble and interrupting  Mood    Anxious  Affect    agitation  Thought Process    highly tangential with evidence of cognitive distortions; continued difficulty considering the viewpoint of others  Insight    Fair  Judgment    Intact  Memory    remote memory intact, recent memory impaired  Suicide Assessment    no suicidal ideation      A:    1. Attention deficit hyperactivity disorder (ADHD), unspecified ADHD type    2. Adjustment disorder with mixed anxiety and depressed mood      Patient is presenting with improved general mood at this time, and appears to be responding well to her move to [place].   Patient is engaging more actively in her interests due to improved access in the city, and has been more attentive to diet and exercise, resulting in improved general health. Patient has had difficulties with access to healthcare, as there are few providers who accept her specific insurance in her new area. Patient has requested to keep periodic follow-up while she is back in town for several health procedures. P:    Provided education on the use of medication to treat  stress and ADHD, Discussed various factors related to the development and maintenance of  stress and ADHD (including biological, cognitive, behavioral, and environmental factors), Discussed and set plan for behavioral activation, Trained in relaxation strategies, Discussed self-care (sleep, nutrition, rewarding activities, social support, exercise), Discussed benefits of referral for specialty care, Discussed and problem-solved barriers in adhering to behavioral change plan, Motivational Interviewing to increase patient confidence and compliance with adhering to behavioral change plan, Motivational Interviewing to determine importance and readiness for change, Discussed potential barriers to change, Supportive techniques, Emphasized self-care as important for managing overall health, Provided Psychoeducation re: ADHD, CBT to target cognitive distortions, Identified maladaptive thoughts and Engaged in review of behavioral interventions for ADHD accommodation    Patient Plan:  1) Review the attached information on sleep hygiene. Go through the worksheet at the end of it; are there things that make sense to try right now? If so, do them. 2) Make sure to eat regularly. If you haven't eaten in 3-4 hours, grab at least a small snack, even if you have no appetite. If your stomach is talking to you, listen. 3) Aim for at least 30-40 cumulative minutes of movement each day. This doesn't have to be all at once, or a workout. Just stretch or dance to a song you like. If you've been sitting for more than an hour or two, get up and move around for a few minutes.    4) Review the attached

## 2020-01-28 ENCOUNTER — HOSPITAL ENCOUNTER (OUTPATIENT)
Dept: LAB | Age: 75
Discharge: HOME | End: 2020-01-28
Payer: MEDICARE

## 2020-01-28 ENCOUNTER — OFFICE VISIT (OUTPATIENT)
Dept: OPHTHALMOLOGY | Age: 75
End: 2020-01-28
Payer: COMMERCIAL

## 2020-01-28 LAB
ALBUMIN SERPL-MCNC: 4.3 G/DL (ref 3.5–5.2)
ALBUMIN/GLOBULIN RATIO: 1.2 (ref 1–2.5)
ALP BLD-CCNC: 80 U/L (ref 35–104)
ALT SERPL-CCNC: 20 U/L (ref 5–33)
ANION GAP SERPL CALCULATED.3IONS-SCNC: 12 MMOL/L (ref 9–17)
AST SERPL-CCNC: 23 U/L
BILIRUB SERPL-MCNC: 0.35 MG/DL (ref 0.3–1.2)
BUN BLDV-MCNC: 15 MG/DL (ref 8–23)
BUN/CREAT BLD: 18 (ref 9–20)
CALCIUM SERPL-MCNC: 9.8 MG/DL (ref 8.6–10.4)
CHLORIDE BLD-SCNC: 97 MMOL/L (ref 98–107)
CO2: 27 MMOL/L (ref 20–31)
CREAT SERPL-MCNC: 0.84 MG/DL (ref 0.5–0.9)
ESTIMATED AVERAGE GLUCOSE: 114 MG/DL
GFR AFRICAN AMERICAN: >60 ML/MIN
GFR NON-AFRICAN AMERICAN: >60 ML/MIN
GFR SERPL CREATININE-BSD FRML MDRD: ABNORMAL ML/MIN/{1.73_M2}
GFR SERPL CREATININE-BSD FRML MDRD: ABNORMAL ML/MIN/{1.73_M2}
GLUCOSE BLD-MCNC: 103 MG/DL (ref 70–99)
HBA1C MFR BLD: 5.6 % (ref 4.8–5.9)
POTASSIUM SERPL-SCNC: 4.2 MMOL/L (ref 3.7–5.3)
SODIUM BLD-SCNC: 136 MMOL/L (ref 135–144)
TOTAL PROTEIN: 7.9 G/DL (ref 6.4–8.3)

## 2020-01-28 PROCEDURE — 83036 HEMOGLOBIN GLYCOSYLATED A1C: CPT

## 2020-01-28 PROCEDURE — 36415 COLL VENOUS BLD VENIPUNCTURE: CPT

## 2020-01-28 PROCEDURE — 92136 OPHTHALMIC BIOMETRY: CPT

## 2020-01-28 PROCEDURE — 92136 OPHTHALMIC BIOMETRY: CPT | Performed by: OPHTHALMOLOGY

## 2020-01-28 PROCEDURE — 80053 COMPREHEN METABOLIC PANEL: CPT

## 2020-01-28 PROCEDURE — 99024 POSTOP FOLLOW-UP VISIT: CPT | Performed by: OPHTHALMOLOGY

## 2020-01-30 ENCOUNTER — OFFICE VISIT (OUTPATIENT)
Dept: FAMILY MEDICINE CLINIC | Age: 75
End: 2020-01-30
Payer: MEDICARE

## 2020-01-30 VITALS
BODY MASS INDEX: 31.31 KG/M2 | DIASTOLIC BLOOD PRESSURE: 72 MMHG | RESPIRATION RATE: 16 BRPM | HEART RATE: 68 BPM | SYSTOLIC BLOOD PRESSURE: 112 MMHG | HEIGHT: 63 IN | WEIGHT: 176.7 LBS

## 2020-01-30 PROCEDURE — G8400 PT W/DXA NO RESULTS DOC: HCPCS | Performed by: FAMILY MEDICINE

## 2020-01-30 PROCEDURE — G8417 CALC BMI ABV UP PARAM F/U: HCPCS | Performed by: FAMILY MEDICINE

## 2020-01-30 PROCEDURE — 4040F PNEUMOC VAC/ADMIN/RCVD: CPT | Performed by: FAMILY MEDICINE

## 2020-01-30 PROCEDURE — G8427 DOCREV CUR MEDS BY ELIG CLIN: HCPCS | Performed by: FAMILY MEDICINE

## 2020-01-30 PROCEDURE — G8482 FLU IMMUNIZE ORDER/ADMIN: HCPCS | Performed by: FAMILY MEDICINE

## 2020-01-30 PROCEDURE — 99212 OFFICE O/P EST SF 10 MIN: CPT | Performed by: FAMILY MEDICINE

## 2020-01-30 PROCEDURE — 3017F COLORECTAL CA SCREEN DOC REV: CPT | Performed by: FAMILY MEDICINE

## 2020-01-30 PROCEDURE — 1123F ACP DISCUSS/DSCN MKR DOCD: CPT | Performed by: FAMILY MEDICINE

## 2020-01-30 PROCEDURE — 1090F PRES/ABSN URINE INCON ASSESS: CPT | Performed by: FAMILY MEDICINE

## 2020-01-30 PROCEDURE — 1036F TOBACCO NON-USER: CPT | Performed by: FAMILY MEDICINE

## 2020-01-30 PROCEDURE — 99213 OFFICE O/P EST LOW 20 MIN: CPT | Performed by: FAMILY MEDICINE

## 2020-01-30 RX ORDER — GLUCOSAMINE HCL/CHONDROITIN SU 500-400 MG
CAPSULE ORAL
Qty: 100 STRIP | Refills: 1 | Status: SHIPPED | OUTPATIENT
Start: 2020-01-30 | End: 2021-01-27 | Stop reason: SDUPTHER

## 2020-01-30 ASSESSMENT — PATIENT HEALTH QUESTIONNAIRE - PHQ9
SUM OF ALL RESPONSES TO PHQ9 QUESTIONS 1 & 2: 0
SUM OF ALL RESPONSES TO PHQ QUESTIONS 1-9: 0
SUM OF ALL RESPONSES TO PHQ QUESTIONS 1-9: 0
1. LITTLE INTEREST OR PLEASURE IN DOING THINGS: 0
2. FEELING DOWN, DEPRESSED OR HOPELESS: 0

## 2020-01-30 NOTE — PROGRESS NOTES
Stopped Wellbutrin months ago-due to change in medical coverage,doen't feel she really needed medication. Patient was in [place] October to [facility] she was going to stay there until her issue with medical coverage.

## 2020-01-30 NOTE — PROGRESS NOTES
[address], [address] [place] [address] Drive                        Telephone [phone]             Fax [phone]     Janice Connolly  1945  MRN:  [id number]  Date of visit:  1/30/2020    Subjective:    Janice Connolly is a 69 y.o.  female who presents to [facility] today (1/30/2020) for follow up/evaluation of:  Depression and Hyperglycemia      She states that she stopped Wellbutrin months ago due to change in medical coverage. She states that she has not felt much different since she stopped the medication. She is not certain if she needs to re-start Wellbutrin. She was in [place] from October to December. She thought she was going to stay there until she had issues with medical coverage. She has a Medicare Advantage plan, and she could not find providers in [place]. She states that she has been exercising regularly. She states that she has lost about 20 pounds in the past few months. She states that she has been sleeping well. She has concerns about uterine prolapse. She has seen a gynecologist in the past, and she tried a pessary. She is considering surgery. She has the following problem list:  Patient Active Problem List   Diagnosis    Vitamin D deficiency    Obesity (BMI 30-39. 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 She  reports that she has never smoked. She has never used smokeless tobacco.      Objective:    Vitals:    01/30/20 0937   BP: 112/72   Site: Right Upper Arm   Position: Sitting   Cuff Size: Large Adult   Pulse: 68   Resp: 16   Weight: 176 lb 11.2 oz (80.2 kg)   Height: 5' 3\" (1.6 m)     Body mass index is 31.3 kg/m². Obese  female, healthy-appearing, alert, cooperative and in no distress. Neck supple. No adenopathy. Thyroid symmetric, normal size. Chest:  Normal expansion. Clear to auscultation. No rales, rhonchi, or wheezing. Heart sounds are normal.  Regular rate and rhythm without murmur, gallop or rub. Lower extremities have no edema. Affect is somewhat anxious. Thought processes are logical. There is no evidence of paranoia or delusions. Responses to questions are appropriate. Dress and grooming are appropriate.      Labs done 1/28/2020 were reviewed with the patient:   Hospital Outpatient Visit on 01/28/2020   Component Date Value Ref Range Status    Hemoglobin A1C 01/28/2020 5.6  4.8 - 5.9 % Final    Estimated Avg Glucose 01/28/2020 114  mg/dL Final    Glucose 01/28/2020 103* 70 - 99 mg/dL Final    BUN 01/28/2020 15  8 - 23 mg/dL Final    CREATININE 01/28/2020 0.84  0.50 - 0.90 mg/dL Final    Bun/Cre Ratio 01/28/2020 18  9 - 20 Final    Calcium 01/28/2020 9.8  8.6 - 10.4 mg/dL Final    Sodium 01/28/2020 136  135 - 144 mmol/L Final    Potassium 01/28/2020 4.2  3.7 - 5.3 mmol/L Final    Chloride 01/28/2020 97* 98 - 107 mmol/L Final    CO2 01/28/2020 27  20 - 31 mmol/L Final    Anion Gap 01/28/2020 12  9 - 17 mmol/L Final    Alkaline Phosphatase 01/28/2020 80  35 - 104 U/L Final    ALT 01/28/2020 20  5 - 33 U/L Final    AST 01/28/2020 23  <32 U/L Final    Total Bilirubin 01/28/2020 0.35  0.3 - 1.2 mg/dL Final    Total Protein 01/28/2020 7.9  6.4 - 8.3 g/dL Final    Alb 01/28/2020 4.3  3.5 - 5.2 g/dL Final    Albumin/Globulin Ratio 01/28/2020 1.2  1.0 - 2.5 Final    GFR Non- 01/28/2020 >60  >60 mL/min Final    GFR  01/28/2020 >60  >60 mL/min Final    GFR Comment 01/28/2020        Final    Comment: Average GFR for 79or more years old:   76 mL/min/1.73sq m  Chronic Kidney Disease:   <60 mL/min/1.73sq m  Kidney failure:   <15 mL/min/1.73sq m              eGFR calculated using average adult body mass. Additional eGFR calculator available at:        [email or web address]            GFR Staging 01/28/2020 NOT REPORTED   Final         Assessment and Plan:    1. Depression, unspecified depression type  As above, she feels that she is doing well off of Wellbutrin. 2. Hyperglycemia  Her fasting glucose is elevated, but her HgbA1c is within normal limits. She has been trying to lose weight, and she has lost about 20 pounds in the past 9 months. I recommended that she avoid processed carbohydrates, exercise regularly, and continue her attempts to lose weight. Labs were ordered to be done prior to her return visit in 6 months:  - Lipid, Fasting; Future  - Comprehensive Metabolic Panel; Future  - Hemoglobin A1C; Future    Glucometer test strips were refilled:  - blood glucose monitor strips; Test one time a day & as needed for symptoms of irregular blood glucose. Dispense: 100 strip; Refill: 1    3. Female genital prolapse, unspecified type  As above, she has seen a gynecologist, and she had a trial of a pessary. She would like a second opinion. A referral was ordered:  - External Referral To Urogynecology    4. Routine health maintenance  Health maintenance was reviewed with the patient. She has received an influenza vaccine this influenza season. Colonoscopy was recommended.   She states that she has an

## 2020-02-05 ENCOUNTER — TELEPHONE (OUTPATIENT)
Dept: FAMILY MEDICINE CLINIC | Age: 75
End: 2020-02-05

## 2020-02-11 ENCOUNTER — OFFICE VISIT (OUTPATIENT)
Dept: BEHAVIORAL/MENTAL HEALTH | Age: 75
End: 2020-02-11
Payer: MEDICARE

## 2020-02-11 ENCOUNTER — OFFICE VISIT (OUTPATIENT)
Dept: OPTOMETRY | Age: 75
End: 2020-02-11
Payer: MEDICARE

## 2020-02-11 PROCEDURE — G8417 CALC BMI ABV UP PARAM F/U: HCPCS | Performed by: OPTOMETRIST

## 2020-02-11 PROCEDURE — 92250 FUNDUS PHOTOGRAPHY W/I&R: CPT | Performed by: OPTOMETRIST

## 2020-02-11 PROCEDURE — G8400 PT W/DXA NO RESULTS DOC: HCPCS | Performed by: OPTOMETRIST

## 2020-02-11 PROCEDURE — 90837 PSYTX W PT 60 MINUTES: CPT | Performed by: COUNSELOR

## 2020-02-11 PROCEDURE — 99213 OFFICE O/P EST LOW 20 MIN: CPT | Performed by: OPTOMETRIST

## 2020-02-11 PROCEDURE — G8427 DOCREV CUR MEDS BY ELIG CLIN: HCPCS | Performed by: OPTOMETRIST

## 2020-02-11 PROCEDURE — 1123F ACP DISCUSS/DSCN MKR DOCD: CPT | Performed by: OPTOMETRIST

## 2020-02-11 PROCEDURE — G8482 FLU IMMUNIZE ORDER/ADMIN: HCPCS | Performed by: OPTOMETRIST

## 2020-02-11 PROCEDURE — 1090F PRES/ABSN URINE INCON ASSESS: CPT | Performed by: OPTOMETRIST

## 2020-02-11 PROCEDURE — 3017F COLORECTAL CA SCREEN DOC REV: CPT | Performed by: OPTOMETRIST

## 2020-02-11 PROCEDURE — 4040F PNEUMOC VAC/ADMIN/RCVD: CPT | Performed by: OPTOMETRIST

## 2020-02-11 PROCEDURE — 99211 OFF/OP EST MAY X REQ PHY/QHP: CPT

## 2020-02-11 PROCEDURE — 1036F TOBACCO NON-USER: CPT | Performed by: OPTOMETRIST

## 2020-02-11 RX ORDER — PREDNISOLONE/MOXIFLO/NEPAFENAC 1-0.5-0.1%
SUSPENSION, DROPS(FINAL DOSAGE FORM)(ML) OPHTHALMIC (EYE)
Qty: 1 BOTTLE | Refills: 0 | Status: ON HOLD | OUTPATIENT
Start: 2020-02-11 | End: 2020-05-26

## 2020-02-11 ASSESSMENT — REFRACTION_WEARINGRX
OS_SPHERE: -3.50
OD_SPHERE: -3.50
OS_AXIS: 123
OD_CYLINDER: -0.75
OD_AXIS: 125
SPECS_TYPE: SVL
OS_CYLINDER: -0.25

## 2020-02-11 ASSESSMENT — ENCOUNTER SYMPTOMS
GASTROINTESTINAL NEGATIVE: 0
RESPIRATORY NEGATIVE: 0
EYES NEGATIVE: 0
ALLERGIC/IMMUNOLOGIC NEGATIVE: 0

## 2020-02-11 ASSESSMENT — VISUAL ACUITY
OS_CC+: -1
OS_CC: 20/30
OD_CC+: -1
CORRECTION_TYPE: GLASSES
OD_SC: 20/20 OU
METHOD: SNELLEN - LINEAR

## 2020-02-11 ASSESSMENT — SLIT LAMP EXAM - LIDS
COMMENTS: NORMAL
COMMENTS: NORMAL

## 2020-02-11 ASSESSMENT — TONOMETRY
OS_IOP_MMHG: 14
IOP_METHOD: NON-CONTACT AIR PUFF
OD_IOP_MMHG: 19

## 2020-02-11 NOTE — PATIENT INSTRUCTIONS
1) Review the attached information on sleep hygiene. Go through the worksheet at the end of it; are there things that make sense to try right now? If so, do them. 2) Make sure to eat regularly. If you haven't eaten in 3-4 hours, grab at least a small snack, even if you have no appetite. If your stomach is talking to you, listen. 3) Aim for at least 30-40 cumulative minutes of movement each day. This doesn't have to be all at once, or a workout. Just stretch or dance to a song you like. If you've been sitting for more than an hour or two, get up and move around for a few minutes. 4) Review the attached list of coping skills. Try 1-2 a week; do more if you're feeling frisky! 5) Review the attached information on deep breathing. Use this to help manage intense emotions, or just to relax. Practice this daily, even if you're not feeling particularly stressed. 6) Are there things that you've stopped doing because of stress or your mood? If so, make note of them and consider some ways to reincorporate them into your life. This is also something that can be discussed in counseling. 7) Remember to be kind to yourself. This is a challenging experience, and you're doing the best you can. 8) Check out www.[employer]. [employer] for more information on ADHD accommodations.

## 2020-02-11 NOTE — PROGRESS NOTES
Jennifer Devi presents today for   Chief Complaint   Patient presents with    Visual Problems   . HPI     Yesterday patient seen a huge floater out of her Right eye. She said she has had trouble in the past and is scheduled for cataract surgery in the first week of March. She states that the last time it happened was many years ago when she was living in [place] and she was referred to a specialist.   Not noticing today and no flashes were associated            Current Outpatient Medications   Medication Sig Dispense Refill    blood glucose monitor strips Test one time a day & as needed for symptoms of irregular blood glucose. 100 strip 1    Cholecalciferol (VITAMIN D3) 5000 UNITS TABS Take 5,000 Units by mouth daily      co-enzyme Q-10 50 MG capsule Take 50 mg by mouth daily      magnesium oxide (MAG-OX) 400 MG tablet Take 500 mg by mouth daily       Omega-3 300 MG CAPS Take 300 mg by mouth daily       No current facility-administered medications for this visit.           Family History   Problem Relation Age of Onset    Cataracts Father     Diabetes Father    [facility] Alzheimer's Disease Mother     Diabetes Brother     Diabetes Sister     Glaucoma Neg Hx      Social History     Socioeconomic History    Marital status: Single     Spouse name: None    Number of children: None    Years of education: None    Highest education level: None   Occupational History    None   Social Needs    Financial resource strain: None    Food insecurity:     Worry: None     Inability: None    Transportation needs:     Medical: None     Non-medical: None   Tobacco Use    Smoking status: Never Smoker    Smokeless tobacco: Never Used   Substance and Sexual Activity    Alcohol use: No    Drug use: No    Sexual activity: Never   Lifestyle    Physical activity:     Days per week: None     Minutes per session: None    Stress: None   Relationships    Social connections:     Talks on phone: None     Gets together: None     Attends Evangelical service: None     Active member of club or organization: None     Attends meetings of clubs or organizations: None     Relationship status: None    Intimate partner violence:     Fear of current or ex partner: None     Emotionally abused: None     Physically abused: None     Forced sexual activity: None   Other Topics Concern    None   Social History Narrative    None     Past Medical History:   Diagnosis Date    ADHD (attention deficit hyperactivity disorder)     Dysphagia     schatzki ring    Hyperglycemia     Vitamin D deficiency        ROS     Negative for: Constitutional, Gastrointestinal, Neurological, Skin, Genitourinary, Musculoskeletal, HENT, Endocrine, Cardiovascular, Eyes, Respiratory, Psychiatric, Allergic/Imm, Heme/Lymph          Main Ophthalmology Exam     External Exam       Right Left    External Normal Normal          Slit Lamp Exam       Right Left    Lids/Lashes Normal Normal    Conjunctiva/Sclera White and quiet White and quiet    Cornea Clear Clear    Anterior Chamber Deep and quiet Deep and quiet    Iris Round and reactive Round and reactive    Lens Trace Nuclear sclerosis Trace Nuclear sclerosis    Vitreous Normal Normal          Fundus Exam       Right Left    Disc Normal Normal    C/D Ratio wnl  wnl     Macula Normal Normal    Vessels Normal Normal    Periphery Normal Normal    360 attached                    Tonometry     Tonometry (Non-contact air puff, 9:41 AM)       Right Left    Pressure 19 14   IOP.1             21.2  CH:  9.3          17.3  WS: 5.7          4.9                  Not recorded         Not recorded          Visual Acuity (Snellen - Linear)       Right Left    Dist cc 20/30 -1 20/30 -1    Near sc 20/20 ou      Correction:  Glasses           Not recorded        Neuro/Psych     Neuro/Psych     Oriented x3:  Yes    Mood/Affect:  Normal               Not recorded            Ophthalmology Exam     Wearing Rx       Sphere Cylinder Axis Right -3.50 -0.75 125    Left -3.50 -0.25 123    Age:  2rs    Type:  SVL               Not recorded               1. Vitreous floaters of both eyes    2. Vitreous degeneration of both eyes           Patient Instructions   NO treatment today; If any flashes or flood of floaters return asap      Return if symptoms worsen or fail to improve, for if any flashes or flood of floaters return asap .

## 2020-02-17 ENCOUNTER — TELEPHONE (OUTPATIENT)
Dept: BEHAVIORAL/MENTAL HEALTH | Age: 75
End: 2020-02-17

## 2020-02-19 ENCOUNTER — OFFICE VISIT (OUTPATIENT)
Dept: FAMILY MEDICINE CLINIC | Age: 75
End: 2020-02-19
Payer: MEDICARE

## 2020-02-19 VITALS
TEMPERATURE: 97.9 F | HEIGHT: 63 IN | OXYGEN SATURATION: 99 % | WEIGHT: 169.1 LBS | BODY MASS INDEX: 29.96 KG/M2 | DIASTOLIC BLOOD PRESSURE: 60 MMHG | HEART RATE: 82 BPM | SYSTOLIC BLOOD PRESSURE: 102 MMHG | RESPIRATION RATE: 16 BRPM

## 2020-02-19 PROCEDURE — 99213 OFFICE O/P EST LOW 20 MIN: CPT | Performed by: FAMILY MEDICINE

## 2020-02-19 PROCEDURE — 1123F ACP DISCUSS/DSCN MKR DOCD: CPT | Performed by: FAMILY MEDICINE

## 2020-02-19 PROCEDURE — G8482 FLU IMMUNIZE ORDER/ADMIN: HCPCS | Performed by: FAMILY MEDICINE

## 2020-02-19 PROCEDURE — 93010 ELECTROCARDIOGRAM REPORT: CPT | Performed by: FAMILY MEDICINE

## 2020-02-19 PROCEDURE — G8427 DOCREV CUR MEDS BY ELIG CLIN: HCPCS | Performed by: FAMILY MEDICINE

## 2020-02-19 PROCEDURE — 1090F PRES/ABSN URINE INCON ASSESS: CPT | Performed by: FAMILY MEDICINE

## 2020-02-19 PROCEDURE — 1036F TOBACCO NON-USER: CPT | Performed by: FAMILY MEDICINE

## 2020-02-19 PROCEDURE — 4040F PNEUMOC VAC/ADMIN/RCVD: CPT | Performed by: FAMILY MEDICINE

## 2020-02-19 PROCEDURE — G8400 PT W/DXA NO RESULTS DOC: HCPCS | Performed by: FAMILY MEDICINE

## 2020-02-19 PROCEDURE — 93005 ELECTROCARDIOGRAM TRACING: CPT | Performed by: FAMILY MEDICINE

## 2020-02-19 PROCEDURE — 99213 OFFICE O/P EST LOW 20 MIN: CPT

## 2020-02-19 PROCEDURE — G8417 CALC BMI ABV UP PARAM F/U: HCPCS | Performed by: FAMILY MEDICINE

## 2020-02-19 PROCEDURE — 3017F COLORECTAL CA SCREEN DOC REV: CPT | Performed by: FAMILY MEDICINE

## 2020-02-19 ASSESSMENT — ENCOUNTER SYMPTOMS
COUGH: 0
SINUS PAIN: 0
DIARRHEA: 0
NAUSEA: 0
SORE THROAT: 0
ABDOMINAL PAIN: 0
EYE PAIN: 0
SHORTNESS OF BREATH: 0
BLOOD IN STOOL: 0
CONSTIPATION: 0
SINUS PRESSURE: 0
CHEST TIGHTNESS: 0
VOMITING: 0

## 2020-02-19 NOTE — TELEPHONE ENCOUNTER
Unable to reach patient via phone. Left message requesting that patient contact the Family Practice desk if she would still like to speak with me tomorrow, as my desk line does not have a voicemail inbox for messages.

## 2020-02-19 NOTE — PROGRESS NOTES
Dispense Refill    blood glucose monitor strips Test one time a day & as needed for symptoms of irregular blood glucose. 100 strip 1    Cholecalciferol (VITAMIN D3) 5000 UNITS TABS Take 5,000 Units by mouth daily      co-enzyme Q-10 50 MG capsule Take 50 mg by mouth daily      magnesium oxide (MAG-OX) 400 MG tablet Take 500 mg by mouth daily       Omega-3 300 MG CAPS Take 300 mg by mouth daily      Prednisolon-Moxiflox-Nepafenac 1-0.5-0.1 % SUSP Use one drop four times a day in the eye to be operated on starting one day prior to cataract surgery, and once on the morning of surgery. (Patient not taking: Reported on 2/19/2020) 1 Bottle 0     No current facility-administered medications for this visit. She is allergic to asa [aspirin]; celexa [citalopram hydrobromide]; penicillins; sulfur; and wasp venom. She  reports that she has never smoked. She has never used smokeless tobacco..    Review of Systems:  Review of Systems   Constitutional: Negative for appetite change, fatigue and fever. HENT: Negative for sinus pressure, sinus pain, sneezing and sore throat. Eyes: Positive for visual disturbance. Negative for pain. Floaters   Respiratory: Negative for cough, chest tightness and shortness of breath. Cardiovascular: Negative for chest pain, palpitations and leg swelling. Gastrointestinal: Negative for abdominal pain, blood in stool, constipation, diarrhea, nausea and vomiting. Endocrine: Negative for cold intolerance, heat intolerance and polyuria. Genitourinary: Negative for dysuria and frequency. Musculoskeletal: Negative for arthralgias and joint swelling. Skin: Negative for rash and wound. Neurological: Negative for dizziness, light-headedness and headaches. Hematological: Negative for adenopathy. Does not bruise/bleed easily. Psychiatric/Behavioral: Positive for sleep disturbance. Negative for dysphoric mood. The patient is not nervous/anxious. Objective:    Vitals:    02/19/20 1055   BP: 102/60   Pulse: 82   Resp: 16   Temp: 97.9 °F (36.6 °C)   SpO2: 99%     Body mass index is 30.44 kg/m². Obese  female healthy-appearing, alert, no distress, cooperative . Pupils are equal, round, reactive to light. Tympanic membranes and oropharynx are normal  bilaterally. Neck is supple. Chest clear to auscultation, no wheezes, rales, or rhonchi. Heart sounds are regular rate and rhythm, no murmur. Abdomen soft, non-tender. No masses or organomegaly. Lower extremities have no edema. ECG done today showed a normal sinus rhythm with a rate of 76. Assessment and Plan:       1. Pre-operative evaluation  Daly Meier is a low-risk candidate for the planned surgery. 2.  Routine health maintenance  Health maintenance was reviewed with the patient. She has received an influenza vaccine this influenza season. Colonoscopy was recommended. She plans to have her colonoscopy after she has cataract surgery. She has a mammogram scheduled for next week. All other health maintenance, including Pneumovax, Prevnar-13, Tdap, and Shingrix, is up to date at this time. This note was created using voice-recognition software, and may contain inaccuracies of transcription which were inadvertently overlooked prior to signature. For any questions, please contact me.

## 2020-02-20 ENCOUNTER — TELEPHONE (OUTPATIENT)
Dept: FAMILY MEDICINE CLINIC | Age: 75
End: 2020-02-20

## 2020-02-25 ENCOUNTER — HOSPITAL ENCOUNTER (OUTPATIENT)
Dept: MAMMOGRAPHY | Age: 75
Discharge: HOME | End: 2020-02-27
Payer: MEDICARE

## 2020-02-25 PROCEDURE — 77067 SCR MAMMO BI INCL CAD: CPT

## 2020-02-27 ENCOUNTER — OFFICE VISIT (OUTPATIENT)
Dept: BEHAVIORAL/MENTAL HEALTH | Age: 75
End: 2020-02-27
Payer: MEDICARE

## 2020-02-27 PROCEDURE — 90837 PSYTX W PT 60 MINUTES: CPT | Performed by: COUNSELOR

## 2020-02-27 NOTE — PATIENT INSTRUCTIONS
1) Review the attached information on sleep hygiene. Go through the worksheet at the end of it; are there things that make sense to try right now? If so, do them. 2) Make sure to eat regularly. If you haven't eaten in 3-4 hours, grab at least a small snack, even if you have no appetite. If your stomach is talking to you, listen. 3) Aim for at least 30-40 cumulative minutes of movement each day. This doesn't have to be all at once, or a workout. Just stretch or dance to a song you like. If you've been sitting for more than an hour or two, get up and move around for a few minutes. 4) Review the attached list of coping skills. Try 1-2 a week; do more if you're feeling frisky! 5) Review the attached information on deep breathing. Use this to help manage intense emotions, or just to relax. Practice this daily, even if you're not feeling particularly stressed. 6) Are there things that you've stopped doing because of stress or your mood? If so, make note of them and consider some ways to reincorporate them into your life. This is also something that can be discussed in counseling. 7) Remember to be kind to yourself. This is a challenging experience, and you're doing the best you can. 8) Check out www.[employer]. [employer] for more information on ADHD accommodations.

## 2020-02-27 NOTE — PROGRESS NOTES
Behavioral Health Consultation/Psychotherapy Note  [facility]. Karely German Psy.D. Visit Date:  2/27/2020    Patient:  Criss Cortes  YOB: 1945  Chief Complaint:  Follow-up    Duration of session:   60 minutes      S:     Patient presented alone and on time for appointment. Patient engaged readily but continues to demonstrate elevated distractibility and circular thinking with high perseveration on content themes. Patient discussed her ongoing confusion regarding the ambiguity in her relationship with her landlord, and her uncertainty whether the relationship is becoming more romantic in nature. Patient reported discomfort addressing this directly with the man, and processed the reasons behind this. Patient discussed the results of her medical consultation regarding her symptoms of organ prolapse, and processed her anxiety regarding her upcoming ophthalmological surgery. O:    Appearance    Patient presents as alert, oriented, and cooperative  Appetite normal  Sleep disturbance some increased disturbance due to elevated stress  Loss of pleasure No  Speech    clear and understandable but highly voluble and interrupting  Mood    Anxious  Affect    agitation  Thought Process    highly tangential with evidence of cognitive distortions; continued difficulty considering the viewpoint of others  Insight    Fair  Judgment    Intact  Memory    remote memory intact, recent memory impaired  Suicide Assessment    no suicidal ideation      A:    1. Attention deficit hyperactivity disorder (ADHD), unspecified ADHD type    2. Adjustment disorder with mixed anxiety and depressed mood      Patient continues to present with improved general mood, and continues to remain active in the community while staying here for medical care. Patient continues to experience acute stressors within her immediate social network, but accesses her supportive resources appropriately.   Patient is demonstrating an increasing tendency to fixate on specific thematic content in her discussion, and to struggle with information retention that would allow her to move beyond that. This may be a feature of her ADHD or could be indicative of a change in cognitive process. Patient continues to request periodic follow-up while back in town for scheduled health procedures. P:    Provided education on the use of medication to treat  stress and ADHD, Discussed various factors related to the development and maintenance of  stress and ADHD (including biological, cognitive, behavioral, and environmental factors), Discussed and set plan for behavioral activation, Trained in relaxation strategies, Discussed self-care (sleep, nutrition, rewarding activities, social support, exercise), Discussed benefits of referral for specialty care, Discussed and problem-solved barriers in adhering to behavioral change plan, Motivational Interviewing to increase patient confidence and compliance with adhering to behavioral change plan, Motivational Interviewing to determine importance and readiness for change, Discussed potential barriers to change, Supportive techniques, Emphasized self-care as important for managing overall health, Provided Psychoeducation re: ADHD, CBT to target cognitive distortions, Identified maladaptive thoughts and Engaged in review of behavioral interventions for ADHD accommodation    Patient Plan:  1) Review the attached information on sleep hygiene. Go through the worksheet at the end of it; are there things that make sense to try right now? If so, do them. 2) Make sure to eat regularly. If you haven't eaten in 3-4 hours, grab at least a small snack, even if you have no appetite. If your stomach is talking to you, listen. 3) Aim for at least 30-40 cumulative minutes of movement each day. This doesn't have to be all at once, or a workout. Just stretch or dance to a song you like.  If you've been sitting for more than an hour or two, get up and move around for a few minutes. 4) Review the attached list of coping skills. Try 1-2 a week; do more if you're feeling frisky! 5) Review the attached information on deep breathing. Use this to help manage intense emotions, or just to relax. Practice this daily, even if you're not feeling particularly stressed. 6) Are there things that you've stopped doing because of stress or your mood? If so, make note of them and consider some ways to reincorporate them into your life. This is also something that can be discussed in counseling. 7) Remember to be kind to yourself. This is a challenging experience, and you're doing the best you can. 8) Check out www.[employer]. [employer] for more information on ADHD accommodations. Patient to return in 2 weeks for follow-up. All questions about treatment plan answered. Pt instructed to call the crisis line and/or proceed to emergency room if suicidal or homicidal ideations occur outside of clinic hours and crisis management skills do not provide relief. Pt stated understanding and is agreeable to treatment and crisis plan.     (Please note that portions of this note were completed with a voice recognition program. Efforts were made to edit the dictations but occasionally words are mis-transcribed.)      Provider Signature:  Electronically signed by Dougie Stephens PSYD on 2/27/2020 at 11:01 AM

## 2020-03-04 NOTE — H&P
Please see below clearance exam from Dr. Ade Washington, Family Medicine, from 19 Feb 2020.    ________________________________________________________________    Office Visit     2/19/2020  Jay STEELE department [address], MD   Family Medicine   Preop testing   Dx   Pre-op Exam   Reason for Visit    Progress Notes     Expand All Collapse All                             [facility]                        [address] [facility] [facility], [facility] [address]                        Telephone [phone]                        Fax [phone]        Izzy Velazquez  1945  MRN:  [id number]  Date of visit:  2/19/2020      Subjective:     Izzy Velazquez is a 69 y.o. female who presents to [facility] today (2/19/2020) for a pre-operative evaluation.        She is scheduled for cataract extraction with Dr. Kojo Gonzales on 3/4/2020. She states that she has been feeling well. She has been exercising regularly. She denies chest pain or shortness of breath with activity or at rest.  She is tolerating her medications well. She denies any issues with anesthesia or with wound healing with any previous surgeries or procedures. She denies fever, chills, palpitations, sinus symptoms, abdominal pain, or change in bowel movements.     She has the following problem list:      Patient Active Problem List   Diagnosis    Vitamin D deficiency    Obesity (BMI 30-39. 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    Dermatochalasis of both upper eyelids         Past Surgical History         Past Surgical History:   Procedure Laterality Date    COLONOSCOPY   08/12/2013     internal hemorrhoids,two sessile polyps ascending colon,few diverticula noted descending colon and sigmoid colon, Additional Documentation     Vitals:    /60 (Site: Right Upper Arm, Position: Sitting, Cuff Size: Large Adult)    Pulse 82    Temp 97.9 °F (36.6 °C) (Tympanic)    Resp 16    Ht 5' 2.5\" (1.588 m)    Wt 169 lb 1.6 oz (76.7 kg)    SpO2 99%    BMI 30.44 kg/m²    BSA 1.84 m²       More Vitals    Flowsheets:    Calorie Assessment,    PHQ-2,    Vitals Reassessment       Encounter Info:    Billing Info,    Allergies,    Detailed Report,    History,    Medications,    Questionnaires       Communications         Chart Routed to Monica Squires MD   Chart Review Routing History     No routing history on file.    Encounter Status     Closed by Ilsa Wiley MD on 2/19/20 at 13:26

## 2020-03-05 ENCOUNTER — ANESTHESIA EVENT (OUTPATIENT)
Dept: OPERATING ROOM | Age: 75
End: 2020-03-05
Payer: MEDICARE

## 2020-03-05 ENCOUNTER — HOSPITAL ENCOUNTER (OUTPATIENT)
Age: 75
Setting detail: OUTPATIENT SURGERY
Discharge: HOME | End: 2020-03-05
Attending: OPHTHALMOLOGY | Admitting: OPHTHALMOLOGY
Payer: MEDICARE

## 2020-03-05 ENCOUNTER — ANESTHESIA (OUTPATIENT)
Dept: OPERATING ROOM | Age: 75
End: 2020-03-05
Payer: MEDICARE

## 2020-03-05 VITALS
RESPIRATION RATE: 15 BRPM | DIASTOLIC BLOOD PRESSURE: 57 MMHG | OXYGEN SATURATION: 99 % | TEMPERATURE: 97 F | SYSTOLIC BLOOD PRESSURE: 100 MMHG

## 2020-03-05 VITALS
RESPIRATION RATE: 18 BRPM | HEART RATE: 65 BPM | BODY MASS INDEX: 29.95 KG/M2 | TEMPERATURE: 97 F | OXYGEN SATURATION: 100 % | HEIGHT: 63 IN | WEIGHT: 169 LBS | DIASTOLIC BLOOD PRESSURE: 58 MMHG | SYSTOLIC BLOOD PRESSURE: 128 MMHG

## 2020-03-05 PROCEDURE — 6370000000 HC RX 637 (ALT 250 FOR IP): Performed by: OPHTHALMOLOGY

## 2020-03-05 PROCEDURE — V2632 POST CHMBR INTRAOCULAR LENS: HCPCS | Performed by: OPHTHALMOLOGY

## 2020-03-05 PROCEDURE — 3600000012 HC SURGERY LEVEL 2 ADDTL 15MIN: Performed by: OPHTHALMOLOGY

## 2020-03-05 PROCEDURE — 2580000003 HC RX 258: Performed by: OPHTHALMOLOGY

## 2020-03-05 PROCEDURE — 66984 XCAPSL CTRC RMVL W/O ECP: CPT | Performed by: OPHTHALMOLOGY

## 2020-03-05 PROCEDURE — 3600000002 HC SURGERY LEVEL 2 BASE: Performed by: OPHTHALMOLOGY

## 2020-03-05 PROCEDURE — 2500000003 HC RX 250 WO HCPCS: Performed by: OPHTHALMOLOGY

## 2020-03-05 PROCEDURE — 7100000010 HC PHASE II RECOVERY - FIRST 15 MIN: Performed by: OPHTHALMOLOGY

## 2020-03-05 PROCEDURE — 2709999900 HC NON-CHARGEABLE SUPPLY: Performed by: OPHTHALMOLOGY

## 2020-03-05 PROCEDURE — 7100000011 HC PHASE II RECOVERY - ADDTL 15 MIN: Performed by: OPHTHALMOLOGY

## 2020-03-05 PROCEDURE — 3700000000 HC ANESTHESIA ATTENDED CARE: Performed by: OPHTHALMOLOGY

## 2020-03-05 PROCEDURE — 6360000002 HC RX W HCPCS: Performed by: NURSE ANESTHETIST, CERTIFIED REGISTERED

## 2020-03-05 PROCEDURE — 6360000002 HC RX W HCPCS: Performed by: OPHTHALMOLOGY

## 2020-03-05 PROCEDURE — 3700000001 HC ADD 15 MINUTES (ANESTHESIA): Performed by: OPHTHALMOLOGY

## 2020-03-05 DEVICE — IMPLANTABLE DEVICE: Type: IMPLANTABLE DEVICE | Site: EYE | Status: FUNCTIONAL

## 2020-03-05 RX ORDER — CYCLOPENTOLATE HYDROCHLORIDE 10 MG/ML
1 SOLUTION/ DROPS OPHTHALMIC SEE ADMIN INSTRUCTIONS
Status: DISCONTINUED | OUTPATIENT
Start: 2020-03-05 | End: 2020-03-05 | Stop reason: HOSPADM

## 2020-03-05 RX ORDER — KETOROLAC TROMETHAMINE 5 MG/ML
1 SOLUTION OPHTHALMIC SEE ADMIN INSTRUCTIONS
Status: DISCONTINUED | OUTPATIENT
Start: 2020-03-05 | End: 2020-03-05 | Stop reason: HOSPADM

## 2020-03-05 RX ORDER — MOXIFLOXACIN 5 MG/ML
1 SOLUTION/ DROPS OPHTHALMIC SEE ADMIN INSTRUCTIONS
Status: DISCONTINUED | OUTPATIENT
Start: 2020-03-05 | End: 2020-03-05 | Stop reason: HOSPADM

## 2020-03-05 RX ORDER — MIDAZOLAM HYDROCHLORIDE 1 MG/ML
INJECTION INTRAMUSCULAR; INTRAVENOUS PRN
Status: DISCONTINUED | OUTPATIENT
Start: 2020-03-05 | End: 2020-03-05 | Stop reason: SDUPTHER

## 2020-03-05 RX ORDER — EPINEPHRINE 1 MG/ML
INJECTION, SOLUTION, CONCENTRATE INTRAVENOUS PRN
Status: DISCONTINUED | OUTPATIENT
Start: 2020-03-05 | End: 2020-03-05 | Stop reason: ALTCHOICE

## 2020-03-05 RX ORDER — TETRACAINE HYDROCHLORIDE 5 MG/ML
1 SOLUTION OPHTHALMIC SEE ADMIN INSTRUCTIONS
Status: DISCONTINUED | OUTPATIENT
Start: 2020-03-05 | End: 2020-03-05 | Stop reason: HOSPADM

## 2020-03-05 RX ORDER — SODIUM CHLORIDE 0.9 % (FLUSH) 0.9 %
10 SYRINGE (ML) INJECTION EVERY 12 HOURS SCHEDULED
Status: DISCONTINUED | OUTPATIENT
Start: 2020-03-05 | End: 2020-03-05 | Stop reason: HOSPADM

## 2020-03-05 RX ORDER — SODIUM CHLORIDE 0.9 % (FLUSH) 0.9 %
10 SYRINGE (ML) INJECTION PRN
Status: DISCONTINUED | OUTPATIENT
Start: 2020-03-05 | End: 2020-03-05 | Stop reason: HOSPADM

## 2020-03-05 RX ORDER — PHENYLEPHRINE HCL 2.5 %
1 DROPS OPHTHALMIC (EYE) SEE ADMIN INSTRUCTIONS
Status: DISCONTINUED | OUTPATIENT
Start: 2020-03-05 | End: 2020-03-05 | Stop reason: HOSPADM

## 2020-03-05 RX ORDER — TETRACAINE HYDROCHLORIDE 5 MG/ML
SOLUTION OPHTHALMIC PRN
Status: DISCONTINUED | OUTPATIENT
Start: 2020-03-05 | End: 2020-03-05 | Stop reason: ALTCHOICE

## 2020-03-05 RX ADMIN — KETOROLAC TROMETHAMINE 1 DROP: 5 SOLUTION/ DROPS OPHTHALMIC at 14:35

## 2020-03-05 RX ADMIN — PHENYLEPHRINE HYDROCHLORIDE 1 DROP: 25 SOLUTION/ DROPS OPHTHALMIC at 14:35

## 2020-03-05 RX ADMIN — CYCLOPENTOLATE HYDROCHLORIDE 1 DROP: 10 SOLUTION/ DROPS OPHTHALMIC at 14:35

## 2020-03-05 RX ADMIN — CYCLOPENTOLATE HYDROCHLORIDE 1 DROP: 10 SOLUTION/ DROPS OPHTHALMIC at 14:17

## 2020-03-05 RX ADMIN — MOXIFLOXACIN 1 DROP: 5 SOLUTION/ DROPS OPHTHALMIC at 14:17

## 2020-03-05 RX ADMIN — KETOROLAC TROMETHAMINE 1 DROP: 5 SOLUTION/ DROPS OPHTHALMIC at 14:27

## 2020-03-05 RX ADMIN — CYCLOPENTOLATE HYDROCHLORIDE 1 DROP: 10 SOLUTION/ DROPS OPHTHALMIC at 14:27

## 2020-03-05 RX ADMIN — MIDAZOLAM HYDROCHLORIDE 2 MG: 1 INJECTION, SOLUTION INTRAMUSCULAR; INTRAVENOUS at 14:53

## 2020-03-05 RX ADMIN — MOXIFLOXACIN 1 DROP: 5 SOLUTION/ DROPS OPHTHALMIC at 14:27

## 2020-03-05 RX ADMIN — SODIUM CHLORIDE, PRESERVATIVE FREE 10 ML: 5 INJECTION INTRAVENOUS at 14:29

## 2020-03-05 RX ADMIN — PHENYLEPHRINE HYDROCHLORIDE 1 DROP: 25 SOLUTION/ DROPS OPHTHALMIC at 14:17

## 2020-03-05 RX ADMIN — KETOROLAC TROMETHAMINE 1 DROP: 5 SOLUTION/ DROPS OPHTHALMIC at 14:17

## 2020-03-05 RX ADMIN — PHENYLEPHRINE HYDROCHLORIDE 1 DROP: 25 SOLUTION/ DROPS OPHTHALMIC at 14:27

## 2020-03-05 RX ADMIN — MOXIFLOXACIN 1 DROP: 5 SOLUTION/ DROPS OPHTHALMIC at 14:35

## 2020-03-05 RX ADMIN — SODIUM CHLORIDE, PRESERVATIVE FREE 10 ML: 5 INJECTION INTRAVENOUS at 14:53

## 2020-03-05 RX ADMIN — TETRACAINE HYDROCHLORIDE 1 DROP: 5 SOLUTION OPHTHALMIC at 14:27

## 2020-03-05 RX ADMIN — TETRACAINE HYDROCHLORIDE 1 DROP: 5 SOLUTION OPHTHALMIC at 14:35

## 2020-03-05 RX ADMIN — TETRACAINE HYDROCHLORIDE 1 DROP: 5 SOLUTION OPHTHALMIC at 14:17

## 2020-03-05 ASSESSMENT — PAIN - FUNCTIONAL ASSESSMENT: PAIN_FUNCTIONAL_ASSESSMENT: 0-10

## 2020-03-05 ASSESSMENT — PAIN SCALES - GENERAL
PAINLEVEL_OUTOF10: 0
PAINLEVEL_OUTOF10: 0

## 2020-03-05 NOTE — OP NOTE
posterior-chamber 1-piece IOL was inserted into the capsular bag. The IOL unfolded without complication. The cohesive viscoelastic was evacuated from behind the IOL. The IOL was centered within the bag with good optic capture. The cohesive viscoelastic was then removed from the anterior/posterior chamber. The main and paracentesis incisions were hydrated with BSS. The intraocular IOP was titrated to physiologic IOP. The wounds were checked with Weck-zev sponges found to be water-tight. The IOL remained well-centered. The eyelid speculum was removed. The drapes were removed. The face was cleansed of the povidone-iodine solution with wet and dry gauze. A drop of combination dexamethasone-moxifloxacin-ketorolac was instilled in the operative eye. The patient was raised into a seated position. A pair of sunglasses was placed. The patient was transferred by the nursing/anesthesia staff to the PACU in excellent condition. The patient was counseled in the PACU regarding their postoperative medications/instructions/precautions. An air bubble was placed in the anterior chamber, followed by trypan blue to stain the anterior capsule of the crystalline lens. The Trypan was flushed from the anterior chamber with BSS after remaining in the anterior chamber for 20 seconds  A 6.25 mm malyugin ring was inserted into the anterior chamber to mechanically improve the dilation to facilitate the removal of the cataract. The Malyugin ring was removed with a Sinskey prior to removal of the viscoelastic.

## 2020-03-05 NOTE — ANESTHESIA PRE PROCEDURE
Evaluation  Patient summary reviewed and Nursing notes reviewed no history of anesthetic complications:   Airway: Mallampati: II  TM distance: >3 FB   Neck ROM: full  Mouth opening: > = 3 FB Dental: normal exam         Pulmonary:Negative Pulmonary ROS breath sounds clear to auscultation                             Cardiovascular:  Exercise tolerance: good (>4 METS),       (-)  angina      Rhythm: regular  Rate: normal                    Neuro/Psych:   (+) psychiatric history:            GI/Hepatic/Renal: Neg GI/Hepatic/Renal ROS            Endo/Other:    (+) hypothyroidism::., .                 Abdominal:           Vascular: negative vascular ROS. Anesthesia Plan      MAC     ASA 2       Induction: intravenous. Anesthetic plan and risks discussed with patient.       Plan discussed with surgical team.                  Chirag Conception, APRN - CRNA   3/5/2020

## 2020-03-05 NOTE — ANESTHESIA POSTPROCEDURE EVALUATION
Department of Anesthesiology  Postprocedure Note    Patient: Dejah Lombardo  MRN: [id number]  YOB: 1945  Date of evaluation: 3/5/2020  Time:  3:29 PM     Procedure Summary     Date:  03/05/20 Room / Location:  [address] [place] [address]    Anesthesia Start:  1443 Anesthesia Stop:      Procedure:  Left Cataract Extraction w/ IOL Impant (Left ) Diagnosis:  (H25.9 cataract)    Surgeon:  Anurag South MD Responsible Provider:  Zelia Halsted, APRN - CRNA    Anesthesia Type:  MAC ASA Status:  2          Anesthesia Type: MAC    Tom Phase I: Tom Score: 10    Tom Phase II:      Last vitals: Reviewed and per EMR flowsheets.        Anesthesia Post Evaluation    Patient location during evaluation: PACU  Patient participation: complete - patient participated  Level of consciousness: awake and alert  Pain score: 0  Airway patency: patent  Nausea & Vomiting: no nausea and no vomiting  Complications: no  Cardiovascular status: blood pressure returned to baseline and hemodynamically stable  Respiratory status: acceptable  Hydration status: euvolemic

## 2020-03-06 ENCOUNTER — OFFICE VISIT (OUTPATIENT)
Dept: OPHTHALMOLOGY | Age: 75
End: 2020-03-06
Payer: MEDICARE

## 2020-03-06 PROBLEM — Z48.810 AFTERCARE FOLLOWING SURGERY OF A SENSORY ORGAN: Status: ACTIVE | Noted: 2020-03-06

## 2020-03-06 PROBLEM — Z98.42 CATARACT EXTRACTION STATUS OF EYE, LEFT: Status: ACTIVE | Noted: 2020-03-06

## 2020-03-06 PROCEDURE — 99024 POSTOP FOLLOW-UP VISIT: CPT | Performed by: OPHTHALMOLOGY

## 2020-03-06 PROCEDURE — 99211 OFF/OP EST MAY X REQ PHY/QHP: CPT

## 2020-03-06 ASSESSMENT — SLIT LAMP EXAM - LIDS
COMMENTS: NORMAL
COMMENTS: NORMAL

## 2020-03-06 ASSESSMENT — TONOMETRY
OS_IOP_MMHG: 32
IOP_METHOD: TONOPEN

## 2020-03-06 ASSESSMENT — VISUAL ACUITY
OS_SC: 20/20
METHOD: SNELLEN - LINEAR

## 2020-03-06 ASSESSMENT — CONF VISUAL FIELD
OD_NORMAL: 1
METHOD: COUNTING FINGERS
OS_NORMAL: 1

## 2020-03-12 ENCOUNTER — OFFICE VISIT (OUTPATIENT)
Dept: OPTOMETRY | Age: 75
End: 2020-03-12
Payer: MEDICARE

## 2020-03-12 PROCEDURE — G8417 CALC BMI ABV UP PARAM F/U: HCPCS | Performed by: OPTOMETRIST

## 2020-03-12 PROCEDURE — 1123F ACP DISCUSS/DSCN MKR DOCD: CPT | Performed by: OPTOMETRIST

## 2020-03-12 PROCEDURE — 1036F TOBACCO NON-USER: CPT | Performed by: OPTOMETRIST

## 2020-03-12 PROCEDURE — 99211 OFF/OP EST MAY X REQ PHY/QHP: CPT

## 2020-03-12 PROCEDURE — 3017F COLORECTAL CA SCREEN DOC REV: CPT | Performed by: OPTOMETRIST

## 2020-03-12 PROCEDURE — G8427 DOCREV CUR MEDS BY ELIG CLIN: HCPCS | Performed by: OPTOMETRIST

## 2020-03-12 PROCEDURE — 1090F PRES/ABSN URINE INCON ASSESS: CPT | Performed by: OPTOMETRIST

## 2020-03-12 PROCEDURE — G8482 FLU IMMUNIZE ORDER/ADMIN: HCPCS | Performed by: OPTOMETRIST

## 2020-03-12 PROCEDURE — 4040F PNEUMOC VAC/ADMIN/RCVD: CPT | Performed by: OPTOMETRIST

## 2020-03-12 PROCEDURE — 99212 OFFICE O/P EST SF 10 MIN: CPT | Performed by: OPTOMETRIST

## 2020-03-12 PROCEDURE — G8400 PT W/DXA NO RESULTS DOC: HCPCS | Performed by: OPTOMETRIST

## 2020-03-12 RX ORDER — PREDNISOLONE ACETATE 10 MG/ML
SUSPENSION/ DROPS OPHTHALMIC
Qty: 1 BOTTLE | Refills: 2 | Status: SHIPPED | OUTPATIENT
Start: 2020-03-12 | End: 2020-04-02

## 2020-03-12 RX ORDER — KETOROLAC TROMETHAMINE 5 MG/ML
SOLUTION OPHTHALMIC
Qty: 1 BOTTLE | Refills: 2 | Status: CANCELLED | OUTPATIENT
Start: 2020-03-12 | End: 2020-04-02

## 2020-03-12 ASSESSMENT — REFRACTION_MANIFEST
OS_CYLINDER: -0.50
OS_AXIS: 020
OS_SPHERE: +0.75

## 2020-03-12 ASSESSMENT — ENCOUNTER SYMPTOMS
ALLERGIC/IMMUNOLOGIC NEGATIVE: 0
RESPIRATORY NEGATIVE: 0
GASTROINTESTINAL NEGATIVE: 0
EYES NEGATIVE: 0

## 2020-03-12 ASSESSMENT — SLIT LAMP EXAM - LIDS: COMMENTS: NORMAL

## 2020-03-12 NOTE — PROGRESS NOTES
Tobacco Use    Smoking status: Never Smoker    Smokeless tobacco: Never Used   Substance and Sexual Activity    Alcohol use: No    Drug use: No    Sexual activity: Never   Lifestyle    Physical activity     Days per week: None     Minutes per session: None    Stress: None   Relationships    Social connections     Talks on phone: None     Gets together: None     Attends Restorationism service: None     Active member of club or organization: None     Attends meetings of clubs or organizations: None     Relationship status: None    Intimate partner violence     Fear of current or ex partner: None     Emotionally abused: None     Physically abused: None     Forced sexual activity: None   Other Topics Concern    None   Social History Narrative    None     Past Medical History:   Diagnosis Date    ADHD (attention deficit hyperactivity disorder)     Dysphagia     schatzki ring    Hyperglycemia     Vitamin D deficiency        ROS     Negative for: Constitutional, Gastrointestinal, Neurological, Skin, Genitourinary, Musculoskeletal, HENT, Endocrine, Cardiovascular, Eyes, Respiratory, Psychiatric, Allergic/Imm, Heme/Lymph          Main Ophthalmology Exam     External Exam       Right Left    External Normal Normal          Slit Lamp Exam       Right Left    Lids/Lashes  Normal    Conjunctiva/Sclera  White and quiet    Cornea  healing well     Anterior Chamber  Deep and quiet    Iris  Round and reactive    Lens Nuclear sclerosis Posterior chamber intraocular lens    Vitreous  Normal                           Pupils     Pupils       Pupils    Right PERRL    Left PERRL              Neuro/Psych     Neuro/Psych     Oriented x3:  Yes    Mood/Affect:  Normal                  Manifest Refraction     Manifest Refraction       Sphere Cylinder Axis Dist VA    Right        Left +0.75 -0.50 020 20/20               Final Rx       Sphere Cylinder Axis Dist VA    Right        Left +0.75 -0.50 020 20/20    Type:  do not fill

## 2020-03-13 ENCOUNTER — OFFICE VISIT (OUTPATIENT)
Dept: BEHAVIORAL/MENTAL HEALTH | Age: 75
End: 2020-03-13
Payer: MEDICARE

## 2020-03-13 PROCEDURE — 90834 PSYTX W PT 45 MINUTES: CPT | Performed by: COUNSELOR
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
## 2020-03-13 NOTE — PATIENT INSTRUCTIONS
1) Review the attached information on sleep hygiene. Go through the worksheet at the end of it; are there things that make sense to try right now? If so, do them. 2) Make sure to eat regularly. If you haven't eaten in 3-4 hours, grab at least a small snack, even if you have no appetite. If your stomach is talking to you, listen. 3) Aim for at least 30-40 cumulative minutes of movement each day. This doesn't have to be all at once, or a workout. Just stretch or dance to a song you like. If you've been sitting for more than an hour or two, get up and move around for a few minutes. 4) Review the attached list of coping skills. Try 1-2 a week; do more if you're feeling frisky! 5) Review the attached information on deep breathing. Use this to help manage intense emotions, or just to relax. Practice this daily, even if you're not feeling particularly stressed. 6) Are there things that you've stopped doing because of stress or your mood? If so, make note of them and consider some ways to reincorporate them into your life. This is also something that can be discussed in counseling. 7) Remember to be kind to yourself. This is a challenging experience, and you're doing the best you can. 8) Check out www.[employer] for more information on ADHD accommodations.

## 2020-03-16 ENCOUNTER — TELEPHONE (OUTPATIENT)
Dept: PREOP | Age: 75
End: 2020-03-16

## 2020-03-16 ENCOUNTER — OFFICE VISIT (OUTPATIENT)
Dept: OPHTHALMOLOGY | Age: 75
End: 2020-03-16
Payer: MEDICARE

## 2020-03-16 PROCEDURE — 99211 OFF/OP EST MAY X REQ PHY/QHP: CPT

## 2020-03-16 PROCEDURE — 99024 POSTOP FOLLOW-UP VISIT: CPT | Performed by: OPHTHALMOLOGY

## 2020-03-16 ASSESSMENT — REFRACTION_WEARINGRX
OS_SPHERE: -3.50
OD_CYLINDER: -0.75
OS_AXIS: 123
OD_SPHERE: -3.50
OD_AXIS: 125
OS_CYLINDER: -0.25
SPECS_TYPE: SVL

## 2020-03-16 ASSESSMENT — CONF VISUAL FIELD
OD_INFERIOR_NASAL_RESTRICTION: 1
OS_NORMAL: 1

## 2020-03-16 ASSESSMENT — TONOMETRY
IOP_METHOD: TONOPEN
OS_IOP_MMHG: 22
OD_IOP_MMHG: 15

## 2020-03-16 ASSESSMENT — SLIT LAMP EXAM - LIDS
COMMENTS: NORMAL
COMMENTS: NORMAL

## 2020-03-16 ASSESSMENT — VISUAL ACUITY
METHOD: SNELLEN - LINEAR
OD_SC: 20/200
OS_SC: 20/20
OD_PH_SC: 20/70

## 2020-03-16 ASSESSMENT — REFRACTION_MANIFEST
OS_CYLINDER: -0.50
OS_SPHERE: +0.75
OS_AXIS: 020

## 2020-03-16 NOTE — PROGRESS NOTES
Melba Acharya is a 69 y.o. female here for a complete eye exam.      Chief Complaint   Patient presents with    Post-Op Check       HPI     Pt is Post op check. Surgery was 3/5/2020. Patient satisfied with her vision. Using drops as prescribed. No pain. Review of Systems      Main Ophthalmology Exam     External Exam       Right Left    External Normal Normal          Slit Lamp Exam       Right Left    Lids/Lashes Normal Normal    Conjunctiva/Sclera White and quiet Trace Injection    Cornea no guttata No corneal edema    Anterior Chamber Deep and quiet No cell, no flare    Iris Round and reactive; 7mm pupil dilation Round and reactive    Lens 2+ Cortical cataract, 2+ Nuclear sclerosis Posterior chamber intraocular lens    Vitreous Normal Normal          Fundus Exam       Right Left    Disc Pink and sharp Pink and sharp    C/D Ratio 0.45 0.45    Macula Flat Flat    Vessels Normal Normal    Periphery Normal Paving stone inferior temporal                   Tonometry     Tonometry (tonopen, 9:59 AM)       Right Left    Pressure 15 22              Visual Acuity     Visual Acuity (Snellen - Linear)       Right Left    Dist sc 20/200 20/20    Dist ph sc 20/70               Pupils     Pupils       Pupils APD    Right PERRL None    Left PERRL nonq               Confrontational Visual Fields     Visual Fields       Right Left      Full    Restrictions Total inferior nasal deficiency                 Not recorded         Not recorded          Past Medical History:   Diagnosis Date    ADHD (attention deficit hyperactivity disorder)     Dysphagia     schatzki ring    Hyperglycemia     Vitamin D deficiency           Current Outpatient Medications:     prednisoLONE acetate (PRED FORTE) 1 % ophthalmic suspension, Place 1 drop into the left eye 3 times daily for 7 days, THEN 1 drop 2 times daily for 7 days, THEN 1 drop daily for 7 days. , Disp: 1 Bottle, Rfl: 2    Prednisolon-Moxiflox-Nepafenac 1-0.5-0.1 % SUSP,

## 2020-03-26 RX ORDER — GLUCOSAMINE HCL/CHONDROITIN SU 500-400 MG
1 CAPSULE ORAL DAILY
Qty: 100 STRIP | Refills: 0 | Status: SHIPPED | OUTPATIENT
Start: 2020-03-26 | End: 2020-05-13

## 2020-03-26 NOTE — TELEPHONE ENCOUNTER
Last Appt:  2/19/2020  Next Appt:   7/30/2020  Med verified in [facility] 3/26/2020

## 2020-03-27 ENCOUNTER — TELEPHONE (OUTPATIENT)
Dept: FAMILY MEDICINE CLINIC | Age: 75
End: 2020-03-27

## 2020-03-27 RX ORDER — LANCETS 30 GAUGE
1 EACH MISCELLANEOUS DAILY
Qty: 100 EACH | Refills: 1 | Status: SHIPPED | OUTPATIENT
Start: 2020-03-27 | End: 2020-07-01

## 2020-04-23 ENCOUNTER — TELEPHONE (OUTPATIENT)
Dept: BEHAVIORAL/MENTAL HEALTH | Age: 75
End: 2020-04-23

## 2020-04-23 NOTE — TELEPHONE ENCOUNTER
Spoke to patient, and advised that text had been sent to specified number an hour prior. Discussed process that patient had used to link to visit; patient identified that she had not completed check-in to waiting room. Walked through connection process with patient to teach steps, and confirmed ability to connect successfully. Patient opted to reschedule for Monday 4/28 to allow for more available time for discussion.

## 2020-05-13 RX ORDER — BLOOD SUGAR DIAGNOSTIC
STRIP MISCELLANEOUS
Qty: 100 STRIP | Refills: 3 | Status: SHIPPED | OUTPATIENT
Start: 2020-05-13 | End: 2021-01-27 | Stop reason: SDUPTHER

## 2020-05-23 ENCOUNTER — HOSPITAL ENCOUNTER (OUTPATIENT)
Age: 75
Discharge: HOME | End: 2020-05-23
Payer: MEDICARE

## 2020-05-23 PROCEDURE — U0002 COVID-19 LAB TEST NON-CDC: HCPCS

## 2020-05-24 LAB
PERFORMING LAB: NORMAL
REPORT: NORMAL
SARS-COV-2: NOT DETECTED

## 2020-05-26 ENCOUNTER — ANESTHESIA EVENT (OUTPATIENT)
Dept: ENDOSCOPY | Age: 75
End: 2020-05-26
Payer: MEDICARE

## 2020-05-26 ENCOUNTER — ANESTHESIA (OUTPATIENT)
Dept: ENDOSCOPY | Age: 75
End: 2020-05-26
Payer: MEDICARE

## 2020-05-26 ENCOUNTER — HOSPITAL ENCOUNTER (OUTPATIENT)
Age: 75
Setting detail: OUTPATIENT SURGERY
Discharge: HOME | End: 2020-05-26
Attending: INTERNAL MEDICINE | Admitting: INTERNAL MEDICINE
Payer: MEDICARE

## 2020-05-26 VITALS
SYSTOLIC BLOOD PRESSURE: 101 MMHG | OXYGEN SATURATION: 98 % | RESPIRATION RATE: 15 BRPM | DIASTOLIC BLOOD PRESSURE: 56 MMHG

## 2020-05-26 VITALS
SYSTOLIC BLOOD PRESSURE: 115 MMHG | HEIGHT: 62 IN | RESPIRATION RATE: 16 BRPM | TEMPERATURE: 97.5 F | DIASTOLIC BLOOD PRESSURE: 62 MMHG | WEIGHT: 151 LBS | HEART RATE: 75 BPM | BODY MASS INDEX: 27.79 KG/M2 | OXYGEN SATURATION: 100 %

## 2020-05-26 PROCEDURE — 3609017700 HC EGD DILATION GASTRIC/DUODENAL STRICTURE: Performed by: INTERNAL MEDICINE

## 2020-05-26 PROCEDURE — 7100000001 HC PACU RECOVERY - ADDTL 15 MIN: Performed by: INTERNAL MEDICINE

## 2020-05-26 PROCEDURE — 3609027000 HC COLONOSCOPY: Performed by: INTERNAL MEDICINE

## 2020-05-26 PROCEDURE — 2580000003 HC RX 258: Performed by: INTERNAL MEDICINE

## 2020-05-26 PROCEDURE — 3609012400 HC EGD TRANSORAL BIOPSY SINGLE/MULTIPLE: Performed by: INTERNAL MEDICINE

## 2020-05-26 PROCEDURE — 88305 TISSUE EXAM BY PATHOLOGIST: CPT

## 2020-05-26 PROCEDURE — 6360000002 HC RX W HCPCS: Performed by: NURSE ANESTHETIST, CERTIFIED REGISTERED

## 2020-05-26 PROCEDURE — 7100000000 HC PACU RECOVERY - FIRST 15 MIN: Performed by: INTERNAL MEDICINE

## 2020-05-26 PROCEDURE — 2709999900 HC NON-CHARGEABLE SUPPLY: Performed by: INTERNAL MEDICINE

## 2020-05-26 PROCEDURE — 3700000000 HC ANESTHESIA ATTENDED CARE: Performed by: INTERNAL MEDICINE

## 2020-05-26 PROCEDURE — 2500000003 HC RX 250 WO HCPCS: Performed by: NURSE ANESTHETIST, CERTIFIED REGISTERED

## 2020-05-26 PROCEDURE — 3700000001 HC ADD 15 MINUTES (ANESTHESIA): Performed by: INTERNAL MEDICINE

## 2020-05-26 RX ORDER — LIDOCAINE HYDROCHLORIDE 20 MG/ML
INJECTION, SOLUTION EPIDURAL; INFILTRATION; INTRACAUDAL; PERINEURAL PRN
Status: DISCONTINUED | OUTPATIENT
Start: 2020-05-26 | End: 2020-05-26 | Stop reason: SDUPTHER

## 2020-05-26 RX ORDER — PROPOFOL 10 MG/ML
INJECTION, EMULSION INTRAVENOUS PRN
Status: DISCONTINUED | OUTPATIENT
Start: 2020-05-26 | End: 2020-05-26 | Stop reason: SDUPTHER

## 2020-05-26 RX ORDER — SODIUM CHLORIDE 450 MG/100ML
INJECTION, SOLUTION INTRAVENOUS CONTINUOUS
Status: DISCONTINUED | OUTPATIENT
Start: 2020-05-26 | End: 2020-05-26 | Stop reason: HOSPADM

## 2020-05-26 RX ADMIN — PROPOFOL 50 MG: 10 INJECTION, EMULSION INTRAVENOUS at 13:33

## 2020-05-26 RX ADMIN — SODIUM CHLORIDE: 4.5 INJECTION, SOLUTION INTRAVENOUS at 13:06

## 2020-05-26 RX ADMIN — PROPOFOL 50 MG: 10 INJECTION, EMULSION INTRAVENOUS at 13:43

## 2020-05-26 RX ADMIN — PROPOFOL 50 MG: 10 INJECTION, EMULSION INTRAVENOUS at 13:46

## 2020-05-26 RX ADMIN — PROPOFOL 50 MG: 10 INJECTION, EMULSION INTRAVENOUS at 13:32

## 2020-05-26 RX ADMIN — PROPOFOL 50 MG: 10 INJECTION, EMULSION INTRAVENOUS at 13:36

## 2020-05-26 RX ADMIN — PROPOFOL 50 MG: 10 INJECTION, EMULSION INTRAVENOUS at 13:39

## 2020-05-26 RX ADMIN — LIDOCAINE HYDROCHLORIDE 100 MG: 20 INJECTION, SOLUTION EPIDURAL; INFILTRATION; INTRACAUDAL; PERINEURAL at 13:32

## 2020-05-26 ASSESSMENT — PAIN SCALES - GENERAL
PAINLEVEL_OUTOF10: 0
PAINLEVEL_OUTOF10: 0

## 2020-05-26 ASSESSMENT — PAIN - FUNCTIONAL ASSESSMENT: PAIN_FUNCTIONAL_ASSESSMENT: 0-10

## 2020-05-26 NOTE — ANESTHESIA PRE PROCEDURE
05/26/20 98/77   03/05/20 (!) 128/58   03/05/20 (!) 100/57       NPO Status: Time of last liquid consumption: 0600                        Time of last solid consumption: 1600                        Date of last liquid consumption: 05/26/20                        Date of last solid food consumption: 05/24/20    BMI:   Wt Readings from Last 3 Encounters:   05/26/20 151 lb (68.5 kg)   03/05/20 169 lb (76.7 kg)   02/19/20 169 lb 1.6 oz (76.7 kg)     Body mass index is 27.62 kg/m². CBC:   Lab Results   Component Value Date    WBC 5.5 12/03/2018    RBC 4.57 12/03/2018    HGB 13.2 12/03/2018    HCT 40.5 12/03/2018    MCV 88.6 12/03/2018    RDW 14.0 12/03/2018     12/03/2018       CMP:   Lab Results   Component Value Date     01/28/2020    K 4.2 01/28/2020    CL 97 01/28/2020    CO2 27 01/28/2020    BUN 15 01/28/2020    CREATININE 0.84 01/28/2020    GFRAA >60 01/28/2020    LABGLOM >60 01/28/2020    GLUCOSE 103 01/28/2020    PROT 7.9 01/28/2020    CALCIUM 9.8 01/28/2020    BILITOT 0.35 01/28/2020    ALKPHOS 80 01/28/2020    AST 23 01/28/2020    ALT 20 01/28/2020       POC Tests: No results for input(s): POCGLU, POCNA, POCK, POCCL, POCBUN, POCHEMO, POCHCT in the last 72 hours.     Coags: No results found for: PROTIME, INR, APTT    HCG (If Applicable): No results found for: PREGTESTUR, PREGSERUM, HCG, HCGQUANT     ABGs: No results found for: PHART, PO2ART, ERN9RZR, OPY2LWT, BEART, Q4JMRSNH     Type & Screen (If Applicable):  No results found for: LABABO, LABRH    Drug/Infectious Status (If Applicable):  Lab Results   Component Value Date    HEPCAB NONREACTIVE 12/03/2018       COVID-19 Screening (If Applicable):   Lab Results   Component Value Date    COVID19 NOT DETECTED 05/23/2020         Anesthesia Evaluation  Patient summary reviewed and Nursing notes reviewed no history of anesthetic complications:   Airway: Mallampati: II  TM distance: >3 FB   Neck ROM: full  Mouth opening: > = 3 FB Dental:    (+)

## 2020-05-26 NOTE — H&P
[address] [address]  Sedation/Analgesia History & Physical    Patient: Dominik Cohn : 1945  Med Rec#: [id number] Acc#: [id number]   Provider Performing Procedure: Johnson Will  Primary Care Physician: Anita Louie MD    PRE-PROCEDURE   Full CODE [x]Yes  DNR-CCA/DNR-CC []Yes   Brief History/Pre-Procedure Diagnosis:dysphagia, polyp          MEDICAL HISTORY  []CAD/Valve  []Liver Disease  []Lung Disease []Diabetes  []Hypertension []Renal Disease  []Additional information:       has a past medical history of ADHD (attention deficit hyperactivity disorder), Dysphagia, Hyperglycemia, and Vitamin D deficiency. SURGICAL HISTORY   has a past surgical history that includes Colonoscopy (2013); Knee arthroscopy (Left, ); other surgical history (); Upper gastrointestinal endoscopy (2013); and Intracapsular cataract extraction (Left, 3/5/2020). Additional information:       ALLERGIES   Allergies as of 2020 - Review Complete 2020   Allergen Reaction Noted    Asa [aspirin]  10/06/2014    Celexa [citalopram hydrobromide]  05/15/2019    Penicillins Nausea Only 2016    Sulfur Hives 2017    Wasp venom Other (See Comments) 2019     Additional information:       MEDICATIONS   Coumadin Use Last 7 Days [x]No []Yes  Antiplatelet drug therapy use last 7 days  [x]No []Yes  Other anticoagulant use last 7 days  [x]No []Yes  No current facility-administered medications for this encounter. Prior to Admission medications    Medication Sig Start Date End Date Taking? Authorizing Provider   ACCU-CHEK IRVIN PLUS strip TEST ONE TIME A DAY AND AS NEEDED FOR SYMPTOMS OF LOW BLOOD SUGAR 20   Anita Louie MD   blood glucose monitor kit and supplies Test ONE times a day & as needed for symptoms of irregular blood glucose.  3/27/20   Anita Louie MD   Lancets MISC 1 each by Does not apply route daily 3/27/20   Anita Louie MD   Prednisolon-Moxiflox-Nepafenac 1-0.5-0.1 % SUSP Use one drop four times a day in the eye to be operated on starting one day prior to cataract surgery, and once on the morning of surgery. 2/11/20   Shaylee Brock, OD   blood glucose monitor strips Test one time a day & as needed for symptoms of irregular blood glucose. 1/30/20   Dimple Pedro MD   Cholecalciferol (VITAMIN D3) 5000 UNITS TABS Take 5,000 Units by mouth daily    Historical Provider, MD   co-enzyme Q-10 50 MG capsule Take 50 mg by mouth daily    Historical Provider, MD   magnesium oxide (MAG-OX) 400 MG tablet Take 500 mg by mouth daily     Historical Provider, MD   Omega-3 300 MG CAPS Take 300 mg by mouth daily    Historical Provider, MD     Additional information:       PHYSICAL:   Heart:  [x]Regular rate and rhythm  []Other:    Lungs:  [x]Clear    []Other:    Abdomen: [x]Soft    []Other:    Mental Status: [x]Alert & Oriented  []Other:      VITAL SIGNS   No data found. PLANNED PROCEDURE  [x]EGD  [x]Colonoscopy []Flex Sigmoid  []ERCP []EUS   []Cystoscopy  [] CATH [] BRONCH   Consent: I have discussed with the patient and/or the patient representative the indication, alternatives, and the possible risks and/or complications of the planned procedure and the anesthesia methods. The patient and/or patient representative appear to understand and agree to proceed. SEDATION  Planned agent:[]Midazolam []Meperidine []Sublimaze []Morphine  []Diazepam [x]Propofol  []Other:     ASA Classification: Class 3 - A patient with severe systemic disease that limits activity but is not incapacitating    Airway Assessment: normal    Monitoring and Safety: The patient will be placed on a cardiac monitor and vital signs, pulse oximetry and level of consciousness will be continuously evaluated throughout the procedure. The patient will be closely monitored until recovery from the medications is complete and the patient has returned to baseline status.   Respiratory therapy will be on standby during the

## 2020-06-10 ENCOUNTER — TELEPHONE (OUTPATIENT)
Dept: OPHTHALMOLOGY | Age: 75
End: 2020-06-10

## 2020-07-01 RX ORDER — LANCETS
EACH MISCELLANEOUS
Qty: 100 EACH | Refills: 1 | Status: SHIPPED | OUTPATIENT
Start: 2020-07-01 | End: 2021-05-04 | Stop reason: SDUPTHER

## 2020-07-01 RX ORDER — BLOOD-GLUCOSE METER
EACH MISCELLANEOUS
Qty: 1 KIT | Refills: 0 | Status: SHIPPED | OUTPATIENT
Start: 2020-07-01 | End: 2021-12-13 | Stop reason: SDUPTHER

## 2020-07-15 ENCOUNTER — OFFICE VISIT (OUTPATIENT)
Dept: OPHTHALMOLOGY | Age: 75
End: 2020-07-15
Payer: MEDICARE

## 2020-07-15 PROCEDURE — 1036F TOBACCO NON-USER: CPT | Performed by: OPHTHALMOLOGY

## 2020-07-15 PROCEDURE — G8417 CALC BMI ABV UP PARAM F/U: HCPCS | Performed by: OPHTHALMOLOGY

## 2020-07-15 PROCEDURE — 92014 COMPRE OPH EXAM EST PT 1/>: CPT | Performed by: OPHTHALMOLOGY

## 2020-07-15 PROCEDURE — 92083 EXTENDED VISUAL FIELD XM: CPT | Performed by: OPHTHALMOLOGY

## 2020-07-15 PROCEDURE — 2022F DILAT RTA XM EVC RTNOPTHY: CPT | Performed by: OPHTHALMOLOGY

## 2020-07-15 PROCEDURE — G8427 DOCREV CUR MEDS BY ELIG CLIN: HCPCS | Performed by: OPHTHALMOLOGY

## 2020-07-15 RX ORDER — MOXIFLOXACIN 5 MG/ML
SOLUTION/ DROPS OPHTHALMIC
Qty: 1 BOTTLE | Refills: 0 | Status: SHIPPED | OUTPATIENT
Start: 2020-07-15 | End: 2020-08-26

## 2020-07-15 RX ORDER — PREDNISOLONE ACETATE 10 MG/ML
SUSPENSION/ DROPS OPHTHALMIC
Qty: 1 BOTTLE | Refills: 2 | Status: SHIPPED | OUTPATIENT
Start: 2020-07-15 | End: 2021-05-04 | Stop reason: SDUPTHER

## 2020-07-15 ASSESSMENT — ENCOUNTER SYMPTOMS
RESPIRATORY NEGATIVE: 0
ALLERGIC/IMMUNOLOGIC NEGATIVE: 0
GASTROINTESTINAL NEGATIVE: 0

## 2020-07-15 ASSESSMENT — TONOMETRY
OD_IOP_MMHG: 14
OS_IOP_MMHG: 18
IOP_METHOD: TONOPEN

## 2020-07-15 ASSESSMENT — VISUAL ACUITY
OD_SC: 20/200
METHOD: SNELLEN - LINEAR
OS_SC: 20/20
OD_PH_SC: 20/50

## 2020-07-15 ASSESSMENT — CONF VISUAL FIELD
OD_NORMAL: 1
METHOD: COUNTING FINGERS

## 2020-07-15 ASSESSMENT — SLIT LAMP EXAM - LIDS
COMMENTS: NORMAL
COMMENTS: NORMAL

## 2020-07-15 NOTE — PROGRESS NOTES
Mauricio Marie is a 69 y.o. female here for a complete eye exam.  Reports her vision feels out of balance since her second eye surgery has not been performed. She is satisfied with the first eye. She has no pain in either eye. Right eye is described as blurry with virtually all activities. Chief Complaint   Patient presents with    Cataract       HPI     Cataract     In right eye. Associated symptoms include blurred vision. Comments     Patient is being seen for a cataract evaluation in right eye, patient states she has blurred vision. Patient had cataract surgery in left eye on 03/05/2020.                 Review of Systems  ROS     Negative for: Gastrointestinal, Cardiovascular, Respiratory, Allergic/Imm          Main Ophthalmology Exam     External Exam       Right Left    External Normal Normal          Slit Lamp Exam       Right Left    Lids/Lashes Normal Normal    Conjunctiva/Sclera White and quiet Trace Injection    Cornea no guttata No corneal edema    Anterior Chamber Deep and quiet No cell, no flare    Iris Round and reactive; 7mm pupil dilation Round and reactive    Lens 2+ Cortical cataract, 2+ Nuclear sclerosis Posterior chamber intraocular lens    Vitreous Normal Normal          Fundus Exam       Right Left    Disc Pink and sharp Pink and sharp    C/D Ratio 0.45 0.45    Macula Flat Flat    Vessels Normal Normal    Periphery Normal Paving stone inferior temporal                   Tonometry     Tonometry (Tonopen, 9:47 AM)       Right Left    Pressure 14 18              Visual Acuity     Visual Acuity (Snellen - Linear)       Right Left    Dist sc 20/200 20/20    Dist ph sc 20/50               Pupils     Pupils       Pupils APD    Right PERRL None    Left PERRL None               Confrontational Visual Fields     Visual Fields (Counting fingers)       Right Left     Full                Extraocular Movement     Extraocular Movement       Right Left     Full, Ortho Full, Ortho Not recorded          Past Medical History:   Diagnosis Date    ADHD (attention deficit hyperactivity disorder)     Dysphagia     schatzki ring    Hyperglycemia     Vitamin D deficiency           Current Outpatient Medications:     Accu-Chek Softclix Lancets MISC, USE TO TEST BLOOD SUGAR DAILY AND AS NEEDED FOR SYMPTOMS OF IRREGULAR BLOOD SUGAR, Disp: 100 each, Rfl: 1    Blood Glucose Monitoring Suppl (ACCU-CHEK IRVIN PLUS) w/Device KIT, USE TO TEST BLOOD SUGAR DAILY AND AS NEEDED FOR SYMPTOMS OF IRREGULAR BLOOD SUGAR, Disp: 1 kit, Rfl: 0    ACCU-CHEK IRVIN PLUS strip, TEST ONE TIME A DAY AND AS NEEDED FOR SYMPTOMS OF LOW BLOOD SUGAR, Disp: 100 strip, Rfl: 3    blood glucose monitor strips, Test one time a day & as needed for symptoms of irregular blood glucose., Disp: 100 strip, Rfl: 1    Cholecalciferol (VITAMIN D3) 5000 UNITS TABS, Take 5,000 Units by mouth daily, Disp: , Rfl:     co-enzyme Q-10 50 MG capsule, Take 50 mg by mouth daily, Disp: , Rfl:     magnesium oxide (MAG-OX) 400 MG tablet, Take 500 mg by mouth daily , Disp: , Rfl:     Omega-3 300 MG CAPS, Take 300 mg by mouth daily, Disp: , Rfl:      Allergies   Allergen Reactions    Asa [Aspirin]      Nausea and Vomiting    Celexa [Citalopram Hydrobromide]      dizziness    Penicillins Nausea Only    Sulfur Hives    Wasp Venom Other (See Comments)     Swelling, rash, itching, peeling/dermatitis        IMPRESSION:  1. Combined forms of age-related cataract of right eye    2. 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 Diabetes mellitus type 2 without retinopathy ([address]        PLAN:    1. Cataract OD, visually significant and affecting ADLs. Informed consent along with RBA reviewed. Patient expressed desire to proceed. Discussed with patient utilization of clear lens for second eye and color implications. Patient expressed understanding and accepts using clear lens for second eye.     Surgical considerations  Good dilation  Second eye (first eye done by Dr. Walter Inman)  Modest density, cortical   Myope settings    2. RD signs and symptoms reviewed  3. Glycemic control and follow-up with PCP encouraged. No retinopathy OU    Return sugery CE/IOL OD.     Electronically signed by Baltazar Cole MD on 7/15/2020 at 10:26 AM

## 2020-07-27 ENCOUNTER — HOSPITAL ENCOUNTER (OUTPATIENT)
Dept: LAB | Age: 75
Discharge: HOME | End: 2020-07-27
Payer: MEDICARE

## 2020-07-27 LAB
ALBUMIN SERPL-MCNC: 4.1 G/DL (ref 3.5–5.2)
ALBUMIN/GLOBULIN RATIO: 1.4 (ref 1–2.5)
ALP BLD-CCNC: 78 U/L (ref 35–104)
ALT SERPL-CCNC: 20 U/L (ref 5–33)
ANION GAP SERPL CALCULATED.3IONS-SCNC: 13 MMOL/L (ref 9–17)
AST SERPL-CCNC: 26 U/L
BILIRUB SERPL-MCNC: 0.39 MG/DL (ref 0.3–1.2)
BUN BLDV-MCNC: 17 MG/DL (ref 8–23)
BUN/CREAT BLD: 23 (ref 9–20)
CALCIUM SERPL-MCNC: 9 MG/DL (ref 8.6–10.4)
CHLORIDE BLD-SCNC: 100 MMOL/L (ref 98–107)
CHOLESTEROL, FASTING: 118 MG/DL
CHOLESTEROL/HDL RATIO: 2
CO2: 26 MMOL/L (ref 20–31)
CREAT SERPL-MCNC: 0.74 MG/DL (ref 0.5–0.9)
ESTIMATED AVERAGE GLUCOSE: 103 MG/DL
GFR AFRICAN AMERICAN: >60 ML/MIN
GFR NON-AFRICAN AMERICAN: >60 ML/MIN
GFR SERPL CREATININE-BSD FRML MDRD: ABNORMAL ML/MIN/{1.73_M2}
GFR SERPL CREATININE-BSD FRML MDRD: ABNORMAL ML/MIN/{1.73_M2}
GLUCOSE BLD-MCNC: 94 MG/DL (ref 70–99)
HBA1C MFR BLD: 5.2 % (ref 4.8–5.9)
HDLC SERPL-MCNC: 59 MG/DL
LDL CHOLESTEROL: 52 MG/DL (ref 0–130)
POTASSIUM SERPL-SCNC: 4.1 MMOL/L (ref 3.7–5.3)
SODIUM BLD-SCNC: 139 MMOL/L (ref 135–144)
THYROXINE, FREE: 1.12 NG/DL (ref 0.93–1.7)
TOTAL PROTEIN: 7 G/DL (ref 6.4–8.3)
TRIGLYCERIDE, FASTING: 36 MG/DL
TSH SERPL DL<=0.05 MIU/L-ACNC: 3.08 MIU/L (ref 0.3–5)
VLDLC SERPL CALC-MCNC: NORMAL MG/DL (ref 1–30)

## 2020-07-27 PROCEDURE — 84439 ASSAY OF FREE THYROXINE: CPT

## 2020-07-27 PROCEDURE — 36415 COLL VENOUS BLD VENIPUNCTURE: CPT

## 2020-07-27 PROCEDURE — 80061 LIPID PANEL: CPT

## 2020-07-27 PROCEDURE — 84443 ASSAY THYROID STIM HORMONE: CPT

## 2020-07-27 PROCEDURE — 80053 COMPREHEN METABOLIC PANEL: CPT

## 2020-07-27 PROCEDURE — 83036 HEMOGLOBIN GLYCOSYLATED A1C: CPT

## 2020-07-30 ENCOUNTER — OFFICE VISIT (OUTPATIENT)
Dept: FAMILY MEDICINE CLINIC | Age: 75
End: 2020-07-30
Payer: MEDICARE

## 2020-07-30 VITALS
BODY MASS INDEX: 29.23 KG/M2 | HEIGHT: 63 IN | HEART RATE: 55 BPM | WEIGHT: 165 LBS | OXYGEN SATURATION: 99 % | SYSTOLIC BLOOD PRESSURE: 138 MMHG | DIASTOLIC BLOOD PRESSURE: 70 MMHG | TEMPERATURE: 98.1 F

## 2020-07-30 PROCEDURE — 93010 ELECTROCARDIOGRAM REPORT: CPT | Performed by: FAMILY MEDICINE

## 2020-07-30 PROCEDURE — 93005 ELECTROCARDIOGRAM TRACING: CPT | Performed by: FAMILY MEDICINE

## 2020-07-30 PROCEDURE — 4040F PNEUMOC VAC/ADMIN/RCVD: CPT | Performed by: FAMILY MEDICINE

## 2020-07-30 PROCEDURE — 1123F ACP DISCUSS/DSCN MKR DOCD: CPT | Performed by: FAMILY MEDICINE

## 2020-07-30 PROCEDURE — G0439 PPPS, SUBSEQ VISIT: HCPCS | Performed by: FAMILY MEDICINE

## 2020-07-30 PROCEDURE — 3017F COLORECTAL CA SCREEN DOC REV: CPT | Performed by: FAMILY MEDICINE

## 2020-07-30 RX ORDER — TRETINOIN 1 MG/G
CREAM TOPICAL
Qty: 1 TUBE | Refills: 3 | Status: SHIPPED | OUTPATIENT
Start: 2020-07-30 | End: 2021-12-13 | Stop reason: SDUPTHER

## 2020-07-30 ASSESSMENT — PATIENT HEALTH QUESTIONNAIRE - PHQ9
SUM OF ALL RESPONSES TO PHQ QUESTIONS 1-9: 0
SUM OF ALL RESPONSES TO PHQ QUESTIONS 1-9: 0

## 2020-07-30 ASSESSMENT — LIFESTYLE VARIABLES: HOW OFTEN DO YOU HAVE A DRINK CONTAINING ALCOHOL: 0

## 2020-07-30 NOTE — PATIENT INSTRUCTIONS
Personalized Preventive Plan for Ivan Alcantar - 7/30/2020  Medicare offers a range of preventive health benefits. Some of the tests and screenings are paid in full while other may be subject to a deductible, co-insurance, and/or copay. Some of these benefits include a comprehensive review of your medical history including lifestyle, illnesses that may run in your family, and various assessments and screenings as appropriate. After reviewing your medical record and screening and assessments performed today your provider may have ordered immunizations, labs, imaging, and/or referrals for you. A list of these orders (if applicable) as well as your Preventive Care list are included within your After Visit Summary for your review. Other Preventive Recommendations:    · A preventive eye exam performed by an eye specialist is recommended every 1-2 years to screen for glaucoma; cataracts, macular degeneration, and other eye disorders. · A preventive dental visit is recommended every 6 months. · Try to get at least 150 minutes of exercise per week or 10,000 steps per day on a pedometer . · Order or download the FREE \"Exercise & Physical Activity: Your Everyday Guide\" from [employer]. Call 9-811.190.2537 or search [employer] online. · You need 3963-7005 mg of calcium and 5477-4297 IU of vitamin D per day. It is possible to meet your calcium requirement with diet alone, but a vitamin D supplement is usually necessary to meet this goal.  · When exposed to the sun, use a sunscreen that protects against both UVA and UVB radiation with an SPF of 30 or greater. Reapply every 2 to 3 hours or after sweating, drying off with a towel, or swimming. · Always wear a seat belt when traveling in a car. Always wear a helmet when riding a bicycle or motorcycle.     Keeping Home a [address]       As we get older, changes in balance, gait, strength, vision, hearing, and cognition make even the most youthful senior more prone to accidents. Falls are one of the leading health risks for older people. This increased risk of falling is related to:   Aging process (eg, decreased muscle strength, slowed reflexes)   Higher incidence of chronic health problems (eg, arthritis, diabetes) that may limit mobility, agility or sensory awareness   Side effects of medicine (eg, dizziness, blurred vision)especially medicines like prescription pain medicines and drugs used to treat mental health conditions   Depending on the brittleness of your bones, the consequences of a fall can be serious and long lasting. Home Life   Research by the Association of Aging [facility]) shows that some home accidents among older adults can be prevented by making simple lifestyle changes and basic modifications and repairs to the home environment. Here are some lifestyle changes that experts recommend:   Have your hearing and vision checked regularly. Be sure to wear prescription glasses that are right for you. Speak to your doctor or pharmacist about the possible side effects of your medicines. A number of medicines can cause dizziness. If you have problems with sleep, talk to your doctor. Limit your intake of alcohol. If necessary, use a cane or walker to help maintain your balance. Wear supportive, rubber-soled shoes, even at home. If you live in a region that gets wintry weather, you may want to put special cleats on your shoes to prevent you from slipping on the snow and ice. Exercise regularly to help maintain muscle tone, agility, and balance. Always hold the banister when going up or down stairs. Also, use  bars when getting in or out of the bath or shower, or using the toilet. To avoid dizziness, get up slowly from a lying down position. Sit up first, dangling your legs for a minute or two before rising to a standing position.    Overall Home Safety Check   According to the Consumer Product Safety Commision's when in use. Make sure kitchen curtains are tied back. Move cords and appliances away from the sink and hot surfaces. If extension cords are needed, install wiring guides so they do not hang over the sink, range, or working areas. Look for coffee pots, kettles and toaster ovens with automatic shut-offs. Keep a mop handy in the kitchen so you can wipe up spills instantly. You should also have a small fire extinguisher. Arrange your kitchen with frequently used items on lower shelves to avoid the need to stand on a stepstool to reach them. Make sure countertops are well-lit to avoid injuries while cutting and preparing food. In the Bathroom    Use a non-slip mat or decals in the tub and shower, since wet, soapy tile or porcelain surfaces are extremely slippery. Make sure bathroom rugs are non-skid or tape them firmly to the floor. Bathtubs should have at least one, preferably two, grab bars, firmly attached to structural supports in the wall. (Do not use built-in soap holders or glass shower doors as grab bars.)    Tub seats fitted with non-slip material on the legs allow you to wash sitting down. For people with limited mobility, bathtub transfer benches allow you to slide safely into the tub. Raised toilet seats and toilet safety rails are helpful for those with knee or hip problems. In the [facility]    Make sure you use a nightlight and that the area around your bed is clear of potential obstacles. Be careful with electric blankets and never go to sleep with a heating pad, which can cause serious burns even if on a low setting. Use fire-resistant mattress covers and pillows, and NEVER smoke in bed. Keep a phone next to the bed that is programmed to dial 911 at the push of a button. If you have a chronic condition, you may want to sign on with an automatic call-in service.  Typically the system includes a small pendant that connects directly to an emergency medical voice-response system. You should also make arrangements to stay in contact with someonefriend, neighbor, family memberon a regular schedule. Fire Prevention   According to the [employer] (Smoke Alarms for Every) [facility], senior citizens are one of the two highest risk groups for death and serious injuries due to residential fires. When cooking, wear short-sleeved items, never a bulky long-sleeved robe. The [facility]'s Safety Checklist for Older Consumers emphasizes the importance of checking basements, garages, workshops and storage areas for fire hazards, such as volatile liquids, piles of old rags or clothing and overloaded circuits. Never smoke in bed or when lying down on a couch or recliner chair. Small portable electric or kerosene heaters are responsible for many home fires and should be used cautiously if at all. If you do use one, be sure to keep them away from flammable materials. In case of fire, make sure you have a pre-established emergency exit plan. Have a professional check your fireplace and other fuel-burning appliances yearly. Hel[employer]   Baby boomers entering the mendiola years will continue to see the development of new products to help older adults live safely and independently in spite of age-related changes. Making Life More Livable  , by Emilee Mcguire, lists over 1,000 products for \"living well in the mature years,\" such as bathing and mobility aids, household security devices, ergonomically designed knives and peelers, and faucet valves and knobs for temperature control. Medical supply stores and organizations are good sources of information about products that improve your quality of life and insure your safety. Last Reviewed: November 2009 Vini Haile MD   Updated: 3/7/2011         Remember to get a flu vaccine in the fall! The flu vaccine typically becomes available in September or October.

## 2020-07-30 NOTE — PROGRESS NOTES
Hemoglobin A1C 07/27/2020 5.2  4.8 - 5.9 % Final    Estimated Avg Glucose 07/27/2020 103  mg/dL Final    Glucose 07/27/2020 94  70 - 99 mg/dL Final    BUN 07/27/2020 17  8 - 23 mg/dL Final    CREATININE 07/27/2020 0.74  0.50 - 0.90 mg/dL Final    Bun/Cre Ratio 07/27/2020 23* 9 - 20 Final    Calcium 07/27/2020 9.0  8.6 - 10.4 mg/dL Final    Sodium 07/27/2020 139  135 - 144 mmol/L Final    Potassium 07/27/2020 4.1  3.7 - 5.3 mmol/L Final    Chloride 07/27/2020 100  98 - 107 mmol/L Final    CO2 07/27/2020 26  20 - 31 mmol/L Final    Anion Gap 07/27/2020 13  9 - 17 mmol/L Final    Alkaline Phosphatase 07/27/2020 78  35 - 104 U/L Final    ALT 07/27/2020 20  5 - 33 U/L Final    AST 07/27/2020 26  <32 U/L Final    Total Bilirubin 07/27/2020 0.39  0.3 - 1.2 mg/dL Final    Total Protein 07/27/2020 7.0  6.4 - 8.3 g/dL Final    Alb 07/27/2020 4.1  3.5 - 5.2 g/dL Final    Albumin/Globulin Ratio 07/27/2020 1.4  1.0 - 2.5 Final    GFR Non- 07/27/2020 >60  >60 mL/min Final    GFR  07/27/2020 >60  >60 mL/min Final    GFR Comment 07/27/2020        Final    Comment: Average GFR for 79or more years old:   76 mL/min/1.73sq m  Chronic Kidney Disease:   <60 mL/min/1.73sq m  Kidney failure:   <15 mL/min/1.73sq m              eGFR calculated using average adult body mass.  Additional eGFR calculator available at:        [employer]            GFR Staging 07/27/2020 NOT REPORTED   Final    Cholesterol, Fasting 07/27/2020 118  <200 mg/dL Final    Comment:    Cholesterol Guidelines:      <200  Desirable   200-240  Borderline      >240  Undesirable         HDL 07/27/2020 59  >40 mg/dL Final    Comment:    HDL Guidelines:    <40     Undesirable   40-59    Borderline    >59     Desirable         LDL Cholesterol 07/27/2020 52  0 - 130 mg/dL Final    Comment:    LDL Guidelines:     <100    Desirable   100-129   Near to/above Desirable   130-159 Borderline      >159   Undesirable     Direct (measured) LDL and calculated LDL are not interchangeable tests.  Chol/HDL Ratio 07/27/2020 2.0  <5 Final            Triglyceride, Fasting 07/27/2020 36  <150 mg/dL Final    Comment:    Triglyceride Guidelines:     <150   Desirable   150-199  Borderline   200-499  High     >499   Very high   Based on AHA Guidelines for fasting triglyceride, October 2012.  VLDL 07/27/2020 NOT REPORTED  1 - 30 mg/dL Final    TSH 07/27/2020 3.08  0.30 - 5.00 mIU/L Final    Thyroxine, Free 07/27/2020 1.12  0.93 - 1.70 ng/dL Final     ECG done today showed:  Sinus  Bradycardia   -Old anterior infarct. Assessment and Plan:    1. Routine general medical examination at a health care facility  See separate progress note for Medicare Wellness Visit. 2. Pre-op examination  She had a normal cardiology evaluation at [facility] 6[facility]12/[facility]. She tolerated her recent surgery well. She is a low-risk candidate for cataract surgery. 3. Elevated glucose  Her fasting glucose and HgbA1c are both within normal limits on her recent lab work. Labs were ordered to be done prior to her return visit in 6 months:   - Hemoglobin A1C; Future  - Comprehensive Metabolic Panel; Future    4. Vitamin D deficiency  Her 25-hydroxy Vitamin D level was within normal limits (47.9 ng/mL) on 1/14/2019. She was advised to continue with her current dose of Vitamin D.       - Vitamin D 25 Hydroxy; Future was ordered to be done prior to her return visit in 6 months. 5. Acne vulgaris  Retin-A was refilled:  - tretinoin (RETIN-A) 0.1 % cream; Apply topically nightly. Dispense: 1 Tube; Refill: 3    6. Overweight (BMI 25.0-29. 9)  She has lost 28 pounds in the past year. Labs were ordered to be done prior to her return visit in 6 months:  - Lipid Panel; Future  - Comprehensive Metabolic Panel; Future    7. Routine health maintenance  Health maintenance was reviewed with the patient.    Annual influenza vaccine was recommended. All other health maintenance, including Shingrix, Pneumovax, Prevnar-13, and Tdap, is up to date at this time.        (Please note that portions of this note were completed with a voice-recognition program. Efforts were made to edit the dictation but occasionally words are mis-transcribed.)

## 2020-07-30 NOTE — PROGRESS NOTES
Medicare Annual Wellness Visit  Name: Eric Davidson Date: 2020   MRN: [id number] Sex: Female   Age: 69 y.o. Ethnicity: Non-/Non    : 1945 Race: Constantino Duenas is here for Medicare AWV; Pre-op Exam ( right eye cataract surgery Dr. Dayana Rivero); and Hyperglycemia (6 month follow up)    Screenings for behavioral, psychosocial and functional/safety risks, and cognitive dysfunction are all negative except as indicated below. These results, as well as other patient data from the [address] form, are documented in Flowsheets linked to this Encounter. Allergies   Allergen Reactions    Asa [Aspirin]      Nausea and Vomiting    Celexa [Citalopram Hydrobromide]      dizziness    Penicillins Nausea Only    Sulfa Antibiotics     Sulfur Hives    Wasp Venom Other (See Comments)     Swelling, rash, itching, peeling/dermatitis       Prior to Visit Medications    Medication Sig Taking? Authorizing Provider   Accu-Chek Softclix Lancets MISC USE TO TEST BLOOD SUGAR DAILY AND AS NEEDED FOR SYMPTOMS OF IRREGULAR BLOOD SUGAR Yes Arlette Rosa MD   Blood Glucose Monitoring Suppl (ACCU-CHEK IRVIN PLUS) w/Device KIT USE TO TEST BLOOD SUGAR DAILY AND AS NEEDED FOR SYMPTOMS OF IRREGULAR BLOOD SUGAR Yes Arlette Rosa MD   ACCU-CHEK IRVIN PLUS strip TEST ONE TIME A DAY AND AS NEEDED FOR SYMPTOMS OF LOW BLOOD SUGAR Yes Arlette Rosa MD   blood glucose monitor strips Test one time a day & as needed for symptoms of irregular blood glucose.  Yes Arlette Rosa MD   Cholecalciferol (VITAMIN D3) 5000 UNITS TABS Take 5,000 Units by mouth daily Yes Historical Provider, MD   co-enzyme Q-10 50 MG capsule Take 50 mg by mouth daily Yes Historical Provider, MD   magnesium oxide (MAG-OX) 400 MG tablet Take 500 mg by mouth daily  Yes Historical Provider, MD   Omega-3 300 MG CAPS Take 300 mg by mouth daily Yes Historical Provider, MD   moxifloxacin (VIGAMOX) 0.5 % ophthalmic solution One drop in operated eye 4 times per day, start the day before surgery, and put in one drop on morning of surgery. Patient not taking: Reported on 7/30/2020  Maco Mendoza MD   prednisoLONE acetate (PRED FORTE) 1 % ophthalmic suspension One drop in operated eye 4 times per day, start the day before surgery, and put in one drop on morning of surgery.   Patient not taking: Reported on 7/30/2020  Maco Mendoza MD       Past Medical History:   Diagnosis Date    ADHD (attention deficit hyperactivity disorder)     Dysphagia     schatzki ring    Hyperglycemia     Vitamin D deficiency        Past Surgical History:   Procedure Laterality Date    BLADDER SUSPENSION  06/24/2020    [facility]. [facility]    COLONOSCOPY  08/12/2013    internal hemorrhoids,two sessile polyps ascending colon,few diverticula noted descending colon and sigmoid colon, per Dr Luz Calhoun at [place] [id number] COLONOSCOPY N/A 5/26/2020    COLONOSCOPY DIAGNOSTIC performed by Yesy Aguiar MD at [facility]    ENDOSCOPY, COLON, DIAGNOSTIC      INTRACAPSULAR CATARACT EXTRACTION Left 3/5/2020    Left Cataract Extraction w/ IOL Impant performed by Shayan Bradshaw MD at [facility] ARTHROSCOPY Left 2006   [facility] OTHER SURGICAL HISTORY  2009    lipoma removed from upper arm    UPPER GASTROINTESTINAL ENDOSCOPY  08/12/2013    fragments of squamous mucosa with minimal,non-specific reactive epithelial changes,negative Hpylori,intestinal metaplasia or dysplasia,negative for active inflammatory changes per Dr Luz Calhoun at [facility] 5/26/2020    EGD ESOPHAGOGASTRODUODENOSCOPY DILATATION performed by Yesy Aguiar MD at [facility] Left 5/26/2020    EGD BIOPSY performed by Yesy Aguiar MD at [facility] End[facility], biopsies showed chronic esophagitis, chronically inflamed gastric cardia-type mucosa, no intestinal metaplasia or dysplasia Family History   Problem Relation Age of Onset    Cataracts Father     Diabetes Father    [facility] Alzheimer's Disease Mother     Diabetes Brother     Diabetes Sister     Glaucoma Neg Hx        CareTeam (Including outside providers/suppliers regularly involved in providing care):   Patient Care Team:  Desiree Ryan MD as PCP - General (Family Medicine)  Desiree Ryan MD as PCP - [facility] Empaneled Provider    Wt Readings from Last 3 Encounters:   07/30/20 165 lb (74.8 kg)   05/26/20 151 lb (68.5 kg)   03/05/20 169 lb (76.7 kg)     Vitals:    07/30/20 1128   BP: 138/70   Site: Right Upper Arm   Position: Sitting   Cuff Size: Large Adult   Pulse: 55   Temp: 98.1 °F (36.7 °C)   TempSrc: Tympanic   SpO2: 99%   Weight: 165 lb (74.8 kg)   Height: 5' 2.5\" (1.588 m)     Body mass index is 29.7 kg/m². Based upon direct observation of the patient, evaluation of cognition reveals recent and remote memory intact. Patient's complete Health Risk Assessment and screening values have been reviewed and are found in Flowsheets. The following problems were reviewed today and where indicated follow up appointments were made and/or referrals ordered. Positive Risk Factor Screenings with Interventions:     Health Habits/Nutrition:  Health Habits/Nutrition  Do you exercise for at least 20 minutes 2-3 times per week?: (!) No(due to recent surgery)  Have you lost any weight without trying in the past 3 months?: No  Do you eat fewer than 2 meals per day?: No  Have you seen a dentist within the past year?: Yes  Body mass index is 29.7 kg/m². Health Habits/Nutrition Interventions:  · Inadequate physical activity:  patient is not ready to increase his/her physical activity level at this time (due to her recent surgery).     Hearing/Vision:  No exam data present  Hearing/Vision  Do you or your family notice any trouble with your hearing?: No  Do you have difficulty driving, watching TV, or doing any of your daily activities because form: see Patient Jennifer Bravo was seen today for medicare awv, pre-op exam and hyperglycemia. Diagnoses and all orders for this visit:    Vitamin D deficiency    Hyperglycemia    Elevated glucose    Obesity (BMI 30-39. 9)    Pre-op examination  -     Cancel: EKG 12 Lead; Future  -     EKG 12 Lead

## 2020-07-31 PROBLEM — E66.3 OVERWEIGHT (BMI 25.0-29.9): Status: ACTIVE | Noted: 2018-05-30

## 2020-08-10 ENCOUNTER — PRE-PROCEDURE TELEPHONE (OUTPATIENT)
Dept: PREADMISSION TESTING | Age: 75
End: 2020-08-10

## 2020-08-10 NOTE — TELEPHONE ENCOUNTER
Spoke with patient regarding a need for a covid test prior to her procedure on 8/18. Scheduled testing for 8/13 @ 10:30 am.  Patient to call back for directions to the sleep center.

## 2020-08-13 ENCOUNTER — HOSPITAL ENCOUNTER (OUTPATIENT)
Dept: PREADMISSION TESTING | Age: 75
Setting detail: SPECIMEN
Discharge: HOME | End: 2020-08-17
Payer: MEDICARE

## 2020-08-13 PROCEDURE — U0003 INFECTIOUS AGENT DETECTION BY NUCLEIC ACID (DNA OR RNA); SEVERE ACUTE RESPIRATORY SYNDROME CORONAVIRUS 2 (SARS-COV-2) (CORONAVIRUS DISEASE [COVID-19]), AMPLIFIED PROBE TECHNIQUE, MAKING USE OF HIGH THROUGHPUT TECHNOLOGIES AS DESCRIBED BY CMS-2020-01-R: HCPCS

## 2020-08-15 LAB — SARS-COV-2, NAA: NOT DETECTED

## 2020-08-17 ENCOUNTER — TELEPHONE (OUTPATIENT)
Dept: FAMILY MEDICINE CLINIC | Age: 75
End: 2020-08-17

## 2020-08-18 ENCOUNTER — HOSPITAL ENCOUNTER (OUTPATIENT)
Age: 75
Setting detail: OUTPATIENT SURGERY
Discharge: HOME | End: 2020-08-18
Attending: OPHTHALMOLOGY | Admitting: OPHTHALMOLOGY
Payer: MEDICARE

## 2020-08-18 ENCOUNTER — ANESTHESIA (OUTPATIENT)
Dept: OPERATING ROOM | Age: 75
End: 2020-08-18
Payer: MEDICARE

## 2020-08-18 ENCOUNTER — ANESTHESIA EVENT (OUTPATIENT)
Dept: OPERATING ROOM | Age: 75
End: 2020-08-18
Payer: MEDICARE

## 2020-08-18 VITALS
BODY MASS INDEX: 28.99 KG/M2 | OXYGEN SATURATION: 100 % | WEIGHT: 163.6 LBS | DIASTOLIC BLOOD PRESSURE: 69 MMHG | TEMPERATURE: 97.4 F | HEART RATE: 64 BPM | SYSTOLIC BLOOD PRESSURE: 132 MMHG | RESPIRATION RATE: 16 BRPM | HEIGHT: 63 IN

## 2020-08-18 VITALS — SYSTOLIC BLOOD PRESSURE: 102 MMHG | OXYGEN SATURATION: 99 % | DIASTOLIC BLOOD PRESSURE: 55 MMHG

## 2020-08-18 PROCEDURE — V2632 POST CHMBR INTRAOCULAR LENS: HCPCS | Performed by: OPHTHALMOLOGY

## 2020-08-18 PROCEDURE — 6370000000 HC RX 637 (ALT 250 FOR IP): Performed by: OPHTHALMOLOGY

## 2020-08-18 PROCEDURE — 7100000011 HC PHASE II RECOVERY - ADDTL 15 MIN: Performed by: OPHTHALMOLOGY

## 2020-08-18 PROCEDURE — 6360000002 HC RX W HCPCS: Performed by: NURSE ANESTHETIST, CERTIFIED REGISTERED

## 2020-08-18 PROCEDURE — 6360000002 HC RX W HCPCS: Performed by: OPHTHALMOLOGY

## 2020-08-18 PROCEDURE — 3600000002 HC SURGERY LEVEL 2 BASE: Performed by: OPHTHALMOLOGY

## 2020-08-18 PROCEDURE — 2709999900 HC NON-CHARGEABLE SUPPLY: Performed by: OPHTHALMOLOGY

## 2020-08-18 PROCEDURE — 3700000000 HC ANESTHESIA ATTENDED CARE: Performed by: OPHTHALMOLOGY

## 2020-08-18 PROCEDURE — 3600000012 HC SURGERY LEVEL 2 ADDTL 15MIN: Performed by: OPHTHALMOLOGY

## 2020-08-18 PROCEDURE — 3700000001 HC ADD 15 MINUTES (ANESTHESIA): Performed by: OPHTHALMOLOGY

## 2020-08-18 PROCEDURE — 2500000003 HC RX 250 WO HCPCS: Performed by: OPHTHALMOLOGY

## 2020-08-18 PROCEDURE — 7100000010 HC PHASE II RECOVERY - FIRST 15 MIN: Performed by: OPHTHALMOLOGY

## 2020-08-18 DEVICE — IMPLANTABLE DEVICE: Type: IMPLANTABLE DEVICE | Site: EYE | Status: FUNCTIONAL

## 2020-08-18 RX ORDER — LIDOCAINE HYDROCHLORIDE 20 MG/ML
JELLY TOPICAL PRN
Status: DISCONTINUED | OUTPATIENT
Start: 2020-08-18 | End: 2020-08-18 | Stop reason: ALTCHOICE

## 2020-08-18 RX ORDER — PHENYLEPHRINE HCL 2.5 %
1 DROPS OPHTHALMIC (EYE) SEE ADMIN INSTRUCTIONS
Status: DISCONTINUED | OUTPATIENT
Start: 2020-08-18 | End: 2020-08-18 | Stop reason: HOSPADM

## 2020-08-18 RX ORDER — TETRACAINE HYDROCHLORIDE 5 MG/ML
1 SOLUTION OPHTHALMIC SEE ADMIN INSTRUCTIONS
Status: DISCONTINUED | OUTPATIENT
Start: 2020-08-18 | End: 2020-08-18 | Stop reason: HOSPADM

## 2020-08-18 RX ORDER — DEXAMETHASONE PHOSPHATE - MOXIFLOXACIN - KETOROLAC TROMETHAMINE 1; .5; .4 MG/ML; MG/ML; MG/ML
INJECTION, SOLUTION OPHTHALMIC PRN
Status: DISCONTINUED | OUTPATIENT
Start: 2020-08-18 | End: 2020-08-18 | Stop reason: ALTCHOICE

## 2020-08-18 RX ORDER — EPINEPHRINE 1 MG/ML
INJECTION, SOLUTION, CONCENTRATE INTRAVENOUS PRN
Status: DISCONTINUED | OUTPATIENT
Start: 2020-08-18 | End: 2020-08-18 | Stop reason: ALTCHOICE

## 2020-08-18 RX ORDER — TETRACAINE HYDROCHLORIDE 5 MG/ML
SOLUTION OPHTHALMIC PRN
Status: DISCONTINUED | OUTPATIENT
Start: 2020-08-18 | End: 2020-08-18 | Stop reason: ALTCHOICE

## 2020-08-18 RX ORDER — TROPICAMIDE 10 MG/ML
1 SOLUTION/ DROPS OPHTHALMIC SEE ADMIN INSTRUCTIONS
Status: DISCONTINUED | OUTPATIENT
Start: 2020-08-18 | End: 2020-08-18 | Stop reason: HOSPADM

## 2020-08-18 RX ORDER — MIDAZOLAM HYDROCHLORIDE 1 MG/ML
INJECTION INTRAMUSCULAR; INTRAVENOUS PRN
Status: DISCONTINUED | OUTPATIENT
Start: 2020-08-18 | End: 2020-08-18 | Stop reason: SDUPTHER

## 2020-08-18 RX ADMIN — TROPICAMIDE 1 DROP: 10 SOLUTION/ DROPS OPHTHALMIC at 13:06

## 2020-08-18 RX ADMIN — PHENYLEPHRINE HYDROCHLORIDE 1 DROP: 25 SOLUTION/ DROPS OPHTHALMIC at 13:07

## 2020-08-18 RX ADMIN — PHENYLEPHRINE HYDROCHLORIDE 1 DROP: 25 SOLUTION/ DROPS OPHTHALMIC at 13:16

## 2020-08-18 RX ADMIN — MIDAZOLAM HYDROCHLORIDE 2 MG: 1 INJECTION, SOLUTION INTRAMUSCULAR; INTRAVENOUS at 14:12

## 2020-08-18 RX ADMIN — TETRACAINE HYDROCHLORIDE 1 DROP: 5 SOLUTION OPHTHALMIC at 13:05

## 2020-08-18 RX ADMIN — TROPICAMIDE 1 DROP: 10 SOLUTION/ DROPS OPHTHALMIC at 13:16

## 2020-08-18 RX ADMIN — TROPICAMIDE 1 DROP: 10 SOLUTION/ DROPS OPHTHALMIC at 13:26

## 2020-08-18 RX ADMIN — TETRACAINE HYDROCHLORIDE 1 DROP: 5 SOLUTION OPHTHALMIC at 13:16

## 2020-08-18 RX ADMIN — TETRACAINE HYDROCHLORIDE 1 DROP: 5 SOLUTION OPHTHALMIC at 13:26

## 2020-08-18 RX ADMIN — PHENYLEPHRINE HYDROCHLORIDE 1 DROP: 25 SOLUTION/ DROPS OPHTHALMIC at 13:26

## 2020-08-18 ASSESSMENT — PULMONARY FUNCTION TESTS
PIF_VALUE: 1

## 2020-08-18 ASSESSMENT — PAIN SCALES - GENERAL
PAINLEVEL_OUTOF10: 0

## 2020-08-18 ASSESSMENT — PAIN - FUNCTIONAL ASSESSMENT: PAIN_FUNCTIONAL_ASSESSMENT: 0-10

## 2020-08-18 NOTE — H&P
Assigned at [facility] Date Recorded   Not on file       COVID-19 Exposure Response Date Recorded   In the last month, have you been in contact with someone who was confirmed or suspected to have Coronavirus / COVID-19? No / Unsure 6/12/2020 12:01 PM EDT   Last Filed Vital Signs  - documented in this encounter  Vital Sign Reading Time Taken Comments   Blood Pressure 136/71 06/12/2020 11:20 AM EDT     Pulse 58 06/12/2020 11:06 AM EDT     Temperature - -     Respiratory Rate 14 06/12/2020 11:06 AM EDT     Oxygen Saturation 99% 06/12/2020 11:06 AM EDT RA   Inhaled Oxygen Concentration - -     Weight 74.8 kg (165 lb) 06/12/2020 11:06 AM EDT     Height 158.8 cm (5' 2.5\") 06/12/2020 11:06 AM EDT     Body Mass Index 29.7 06/12/2020 11:06 AM EDT     Progress Notes  - documented in this encounter  Table of Contents for Progress Notes   Jannet Musa - 06/12/2020 11:10 AM EDT   Jannet Ree - 06/12/2020 11:06 AM EDT      Jannet Ree - 06/12/2020 11:10 AM EDT  Formatting of this note might be different from the original.            [facility]  Rao Everett and Shayy Almanza Department of Cardiovascular Medicine    SECTION OF CARDIOVASCULAR IMAGING    OUTPATIENT VISIT DATE  June 12, 2020    OUTPATIENT VISIT TYPE  ESTABLISHED    PRIMARY CARE PHYSICIAN:  Alesia Rojas [address] [place] [address], [address] [place] [address]  Phone: [phone]    CHIEF COMPLAINT:   Follow up    HISTORY OF PRESENT ILLNESS:  Ms. Randi Ross is a 69year old female who presents today for follow-up visit . Per prior visit 10/24/17  Ms. Randi Ross is a 81year old female with what appears to be mild mitral regurgitation, normal cardiac function, no symptoms, and normal (zero!) calcium score. In sum, patient's cardiovascular status is well for her age No need for follow up   PLAN AND RECOMMENDATIONS:  I emphasized the need for preventive interventions, including vaccines. BP on target  Per patient, no hyperlipidemia.  This needs to be followed as per primary prevention. I am available if any future events occur.     Since her last visit, she states that on 6/24/2020 will have UG surgery. pateint is coming for cardiac clearance    She denies chest pain, shortness of breath, orthopnea, cough, edema, palpitations, PND, lightheadedness or syncope. Patient does a lot exercise        PAST MEDICAL HISTORY   Diagnosis Date    Borderline diabetes    Mitral valve regurgitation     PAST SURGICAL HISTORY   Procedure Laterality Date    EXC PILONID CYST SMPL    KNEE SURGERY HX Left     SOCIAL HISTORY  Social History     Tobacco Use    Smoking status: Never Smoker    Smokeless tobacco: Never Used   Substance Use Topics    Alcohol use: No    Drug use: No     FAMILY HISTORY   Problem Relation Age of Onset    Alzheimer's Disease Mother 80    other (atrial fibrillation) Mother    Diabetes Father 68    Ischemic Heart Disease Father 77   balloon angioplasty    other (Other) Brother 29   Suicide    Diabetes Sister   Type I    Diabetes Brother    other (Sleep Apnea) Brother    Arthritis Brother     ALLERGIES:  ALLERGIES   Allergen Reactions    Sulfa (Sulfonamide * Hives   Hives and febrile state     MEDICATIONS:     FLAXSEED OIL (OMEGA 3 ORAL) Take 2 tablets by mouth once daily. cholecalciferol (VITAMIN D-3) 5,000 unit tab Take 5,000 Units by mouth once daily. UBIDECARENONE/VITAMIN E MIXED (COQ10  ORAL) Take by mouth once daily. Magnesium Oxide 500 mg cap Take by mouth once daily. REVIEW OF SYSTEMS:  See HPI. PHYSICAL EXAMINATION:  /71 (BP Site: Left Arm)  Pulse (!) 58  Resp 14  Ht 158.8 cm (5' 2.5\")  Wt 74.8 kg (165 lb)  SpO2 99%  BMI 29.70 kg/m²   General: Well appearing, in no acute distress. Skin: No clubbing, no cyanosis. Eyes: Extra ocular movements intact  Oropharynx: Teeth in good repair. Neck: No jugular venous distention, no carotid bruits, carotids have a normal upstroke, no palpable thyromegaly.    Lungs: Clear to Encounters   Date Type Specialty Care Team Description   2020 Office Visit UROLOGY Aelxey Varela)    [address], [address] [facility]   [phone] (Fax)       ECG Results  - documented in this encounter    ECG COMPLETE (2020 12:26 PM EDT)  ECG COMPLETE (2020 12:26 PM EDT)   Component Value Ref Range Performed At Pathologist Signature   Transcription NAME : Jonathon Melendrez  PID : [id number]   : Sep 22 1945   Gender : Female  Race :   ORD : [id number]    Procedure Date : 2020 12:26:26  Edit Date : 2020 12:17:12    Diagnosis:SINUS BRADYCARDIA  OTHERWISE NORMAL ECG    Confirmed by Pio Burk MD ([id number]) on 2020 12:17:07 PM        Ventricular Rate : 52  BPM  Atrial Rate : 52  BPM  P-R Interval : 172  ms  QRS Duration : 88  ms  Q-T Interval : 472  ms  QTC Calculation(Bazett) : 438  ms  P Axis : 0  degrees  R Axis : 31  degrees  T Axis : 43  degrees    Test Reason :     Location : 314 : J14  J14    Overread [facility] : [facility]  Edited By : Quynh Jacinto  Referred By : Zilphia Felty  Acquired by :  [facility]       ECG COMPLETE (2020 12:26 PM EDT)   Specimen         ECG COMPLETE (2020 12:26 PM EDT)   Narrative Performed At   This result has an attachment that is not available.              ECG COMPLETE (2020 12:26 PM EDT)   Performing Organization Address City/State/Zipcode Phone Number   [facility]   [facility], [address] [facility]       Visit Diagnoses  - documented in this encounter  Diagnosis   MVP (mitral valve prolapse)    Mitral valve disorders     Historical Medications  - added in this encounter  This list may reflect changes made after this encounter.     Medication Sig Dispensed Refills Start Date End Date   BIOTIN, BULK, MISC   Take 1 tablet by mouth once daily.   0       Images  Patient Demographics    Patient Address Communication Language Race / Ethnicity Marital Status   [address]  [address] [facility] [address] [place] Garcia [phone] (Home)  [phone] (Mobile)  [email or web address] (Preferred) White / Not  or  Single   Patient Contacts    Contact Name Contact Address Communication Relationship to Patient   Marquis Haines Unknown [phone] [facility]) Daughter, Emergency Contact   Document Information    Primary Care Provider Other Service Providers Document Coverage Dates   Pamela Park (Feb. 06, 2020February 06, 2020 - Present)  [phone] (Work)  [address] [place] [address], [address] [place] [address], [address], [address] Pamela Fernandas (Referring)  [phone] (Work)  [address] [place] [address], [address] [place] [address], [address], [address]  Chalmer Babinski (Primary Staff Physician)  [phone] (Work)  [phone] (Fax)  [address], [address] [place] [address],5Th Floor  Cardiology  [address]  [facility] [employer] [facility], [address] [place] [address],5Th Floor Kobe. 12, 2020June 12, 2020 - Jun. 16, 2020June 16, 2020     [address]  [facility] [employer] [facility], [address] [place] [address],5Th Floor     Encounter Providers Encounter Date   Chalmer Babinski (Attending)  [phone] (Work)  [phone] (Fax)  [address], [address] [place] [address],5Th Floor  Cardiology  Imaging Clinician (Attending)  [phone] (Work)  [phone] (Fax)  [facility]  [address], [address] [facility],5Th Floor  Cardiology Kobe. 12, 2020June 12, 2020 - Jun. 16, 2020June 16, 2020      Show All Sections     I have reviewed the history and physical. I examined the patient and find no relevant changes. I have reviewed with the patient the risks, benefits and alternatives to the planned procedures.      Electronically signed by Crissy Farley MD on 8/18/2020 at 1:12 PM

## 2020-08-18 NOTE — ANESTHESIA PRE PROCEDURE
Department of Anesthesiology  Preprocedure Note       Name:  Jaqueline Villatoro   Age:  69 y.o.  :  1945                                          MRN:  [id number]         Date:  2020      Surgeon: Mis Liao):  Apple Waslh MD    Procedure: Right Cataract Extraction w/ IOL Implant (Right )    Medications prior to admission:   Prior to Admission medications    Medication Sig Start Date End Date Taking? Authorizing Provider   tretinoin (RETIN-A) 0.1 % cream Apply topically nightly. 20   Karsten Orellana MD   moxifloxacin (VIGAMOX) 0.5 % ophthalmic solution One drop in operated eye 4 times per day, start the day before surgery, and put in one drop on morning of surgery. Patient not taking: Reported on 2020 7/15/20   Apple Walsh MD   prednisoLONE acetate (PRED FORTE) 1 % ophthalmic suspension One drop in operated eye 4 times per day, start the day before surgery, and put in one drop on morning of surgery. Patient not taking: Reported on 2020 7/15/20   Apple Walsh MD   Accu-Chek Softclix Lancets MISC USE TO TEST BLOOD SUGAR DAILY AND AS NEEDED FOR SYMPTOMS OF IRREGULAR BLOOD SUGAR 20   Karsten Orellana MD   Blood Glucose Monitoring Suppl (ACCU-CHEK IRVIN PLUS) w/Device KIT USE TO TEST BLOOD SUGAR DAILY AND AS NEEDED FOR SYMPTOMS OF IRREGULAR BLOOD SUGAR 20   Willian López MD   ACCU-CHEK IRVIN PLUS strip TEST ONE TIME A DAY AND AS NEEDED FOR SYMPTOMS OF LOW BLOOD SUGAR 20   Karsten Orellana MD   blood glucose monitor strips Test one time a day & as needed for symptoms of irregular blood glucose.  20   Karsten Orellana MD   Cholecalciferol (VITAMIN D3) 5000 UNITS TABS Take 5,000 Units by mouth daily    Historical Provider, MD   co-enzyme Q-10 50 MG capsule Take 50 mg by mouth daily    Historical Provider, MD   magnesium oxide (MAG-OX) 400 MG tablet Take 500 mg by mouth daily     Historical Provider, MD   Omega-3 300 MG CAPS Take 300 mg by mouth daily    Historical Provider, MD       Current medications:    No current facility-administered medications for this visit. No current outpatient medications on file. Facility-Administered Medications Ordered in Other Visits   Medication Dose Route Frequency Provider Last Rate Last Dose    tetracaine (TETRAVISC) 0.5 % ophthalmic solution 1 drop  1 drop Ophthalmic See Admin Instructions Matthieu Glez MD   1 drop at 08/18/20 1326    tropicamide (MYDRIACYL) 1 % ophthalmic solution 1 drop  1 drop Ophthalmic See Admin Instructions Matthieu Glez MD   1 drop at 08/18/20 1326    phenylephrine (MYDFRIN) 2.5 % ophthalmic solution 1 drop  1 drop Ophthalmic See Admin Instructions Matthieu Glez MD   1 drop at 08/18/20 1326    tetracaine (TETRAVISC) 0.5 % ophthalmic solution    PRN Matthieu Glez MD   1 drop at 08/18/20 1340    balanced salts plus (BSS) 0.64 mL with EPINEPHrine (EPINEPHrine HCL) 143 mg, lidocaine PF 4 % 0.22 mL    PRN Matthieu Glez MD   1 mL at 08/18/20 1347       Allergies: Allergies   Allergen Reactions    Asa [Aspirin]      Nausea and Vomiting    Celexa [Citalopram Hydrobromide]      dizziness    Penicillins Nausea Only    Sulfa Antibiotics     Sulfur Hives    Wasp Venom Other (See Comments)     Swelling, rash, itching, peeling/dermatitis       Problem List:    Patient Active Problem List   Diagnosis Code    Vitamin D deficiency E55.9    Overweight (BMI 25.0-29. 9) E66.3    Hypothyroidism E03.9    Hyperglycemia R73.9    Osteopenia of left hip M85.852    Dysphagia R13.10    Combined forms of age-related cataract of both eyes H25.813    Degeneration of posterior vitreous body of both eyes H43.813    Dermatochalasis of both upper eyelids H02.831, B94.341    Combined forms of age-related cataract of left eye H25.812    Cataract extraction status of eye, left Z98.42    Aftercare following surgery of a sensory organ Z48.810       Past Medical History:        Diagnosis BMI:   Wt Readings from Last 3 Encounters:   08/18/20 163 lb 9.6 oz (74.2 kg)   07/30/20 165 lb (74.8 kg)   05/26/20 151 lb (68.5 kg)     There is no height or weight on file to calculate BMI.    CBC:   Lab Results   Component Value Date    WBC 5.5 12/03/2018    RBC 4.57 12/03/2018    HGB 13.2 12/03/2018    HCT 40.5 12/03/2018    MCV 88.6 12/03/2018    RDW 14.0 12/03/2018     12/03/2018       CMP:   Lab Results   Component Value Date     07/27/2020    K 4.1 07/27/2020     07/27/2020    CO2 26 07/27/2020    BUN 17 07/27/2020    CREATININE 0.74 07/27/2020    GFRAA >60 07/27/2020    LABGLOM >60 07/27/2020    GLUCOSE 94 07/27/2020    PROT 7.0 07/27/2020    CALCIUM 9.0 07/27/2020    BILITOT 0.39 07/27/2020    ALKPHOS 78 07/27/2020    AST 26 07/27/2020    ALT 20 07/27/2020       POC Tests: No results for input(s): POCGLU, POCNA, POCK, POCCL, POCBUN, POCHEMO, POCHCT in the last 72 hours. Coags: No results found for: PROTIME, INR, APTT    HCG (If Applicable): No results found for: PREGTESTUR, PREGSERUM, HCG, HCGQUANT     ABGs: No results found for: PHART, PO2ART, QDD0SSM, NOR2JWE, BEART, T5BLLUDI     Type & Screen (If Applicable):  No results found for: LABABO, [address]    Anesthesia Evaluation  Patient summary reviewed and Nursing notes reviewed no history of anesthetic complications:   Airway: Mallampati: II  TM distance: >3 FB   Neck ROM: full  Mouth opening: > = 3 FB Dental: normal exam         Pulmonary:Negative Pulmonary ROS breath sounds clear to auscultation                             Cardiovascular:  Exercise tolerance: good (>4 METS),       (-)  angina      Rhythm: regular  Rate: normal                    Neuro/Psych:   (+) psychiatric history:            GI/Hepatic/Renal: Neg GI/Hepatic/Renal ROS            Endo/Other:    (+) hypothyroidism::., .                 Abdominal:           Vascular: negative vascular ROS. Anesthesia Plan      MAC     ASA 2       Induction: intravenous. Anesthetic plan and risks discussed with patient.       Plan discussed with surgical team.                  DREW Phipps - CORWIN   8/18/2020

## 2020-08-18 NOTE — OP NOTE
Operative NoteSurgical Operative Report  Date of procedure: 08/18/20 2:43 PM  Preoperative Diagnosis: Visually significant cataract  OD  Postoperative Diagosis:  same  Procedure: Cataract extraction with intraocular lens OD  Anesthesia:  topical proparacaine, lidocaine jelly 2%, intracameral Lidocaine 1%/Phenylephrine 1.5%  Estimated blood loss: Less than 5 cc  Indications for procedure: The patient reports significant difficulties with activities of daily living secondary to cataract formation. The patient understands the risks, benefits, and alternatives of cataract surgery as stated in the informed consent. The patient wishes to proceed with cataract surgery without reservation. Operative Summary:  Informed consent was reviewed and the operative site was confirmed by the patient and marked by the physician in the preop area. Topical proparacaine was instilled onto the operative eye and the patient was prepped and draped in the usual sterile fashion in the OR. A lid speculum was placed in the eye and topical lidocaine jelly 2% was placed on the cornea. LRI  @ with an AK blade          [x] No LRI performed     A paracentesis site was created temporally, and the anterior chamber was filled with Lidocaine 1%/Phenylephrine 1.5%. Viscoat was then placed in the anterior chamber and a temporal clear cornea incision was created. A cystotome was used to begin a capsulorrhexis that was completed with Utrata forceps. Hydrodissection was performed and the lens was then phacoemulsified with the phacohandpiece. The cortical material was then removed with the I/A handpiece and the capsule was filled with cohesive viscoelastic. A +11.5 [id number] lens was inserted into the capsular bag and rotated into position. The cohesive viscoelastic was removed with the I/A handpiece, and the chamber was deepened with BSS. A 0.1 ml bolus of Moxifloxacin 5mg/ml was instilled into the anterior chamber.   The wounds were hydrated, checked for water tightness. 10-0 Biosorb suture in paracentesis site/keratome wound        [x]No suture     The wounds were appropriately sealed. The speculum was removed and the patient was taken to the recovery area. The patient tolerated the procedure well and there were no complications.      Addendum:Omidria 1%/0.3% in BSS    []Yes    [x]No      Electronically signed by Santosh Preston MD on 2/6/2020 at 2:19 PM

## 2020-08-19 ENCOUNTER — OFFICE VISIT (OUTPATIENT)
Dept: OPHTHALMOLOGY | Age: 75
End: 2020-08-19
Payer: MEDICARE

## 2020-08-19 PROCEDURE — 99211 OFF/OP EST MAY X REQ PHY/QHP: CPT

## 2020-08-19 PROCEDURE — 99024 POSTOP FOLLOW-UP VISIT: CPT | Performed by: OPHTHALMOLOGY

## 2020-08-19 ASSESSMENT — REFRACTION_MANIFEST
OD_CYLINDER: -1.25
OS_SPHERE: +0.75
OS_AXIS: 180
METHOD_AUTOREFRACTION: 1
OD_SPHERE: +0.25
OS_CYLINDER: -0.75
OD_AXIS: 161

## 2020-08-19 ASSESSMENT — TONOMETRY
IOP_METHOD: TONOPEN
OD_IOP_MMHG: 24

## 2020-08-19 ASSESSMENT — SLIT LAMP EXAM - LIDS
COMMENTS: NORMAL
COMMENTS: NORMAL

## 2020-08-19 ASSESSMENT — VISUAL ACUITY
OD_SC: 20/20
METHOD: SNELLEN - LINEAR
OD_SC+: -3

## 2020-08-19 ASSESSMENT — CONF VISUAL FIELD
OD_NORMAL: 1
OS_NORMAL: 1
METHOD: COUNTING FINGERS

## 2020-08-19 ASSESSMENT — ENCOUNTER SYMPTOMS
GASTROINTESTINAL NEGATIVE: 0
RESPIRATORY NEGATIVE: 0
ALLERGIC/IMMUNOLOGIC NEGATIVE: 0

## 2020-08-19 NOTE — PROGRESS NOTES
Rona López is a 69 y.o. female here for a complete eye exam.      Chief Complaint   Patient presents with    Post-Op Check       HPI     She is here today 1 day post cataract surgery on her right eye. She is doing great, no trouble last night and doing well today. Prednisolone and Moxifloxacin eyedrops schedules understood.    Left eye surgery was done 3/05/2020          Review of Systems  ROS     Negative for: Gastrointestinal, Cardiovascular, Respiratory, Allergic/Imm          Main Ophthalmology Exam     External Exam       Right Left    External Normal Normal          Slit Lamp Exam       Right Left    Lids/Lashes Normal Normal    Conjunctiva/Sclera White and quiet White and quiet    Cornea no guttata, tr MCE at wound No corneal edema    Anterior Chamber Rare cell and pigment, no flare No cell, no flare    Iris Round and reactive Round and reactive    Lens Posterior chamber intraocular lens Posterior chamber intraocular lens    Vitreous Normal Normal          Fundus Exam       Right Left    Disc Pink and sharp Pink and sharp    C/D Ratio 0.45 0.45    Macula Flat Flat    Vessels Normal Normal                   Tonometry     Tonometry (Tonopen, 8:38 AM)       Right Left    Pressure 24           Tonometry #2 (Tonopen, 8:39 AM)       Right Left    Pressure 23               Visual Acuity     Visual Acuity (Snellen - Linear)       Right Left    Dist sc 20/20 -3               Pupils     Pupils       Pupils APD    Right PERRL None    Left PERRL None               Confrontational Visual Fields     Visual Fields (Counting fingers)       Right Left     Full Full               Extraocular Movement     Extraocular Movement       Right Left     Full, Ortho Full, Ortho               Not recorded          Past Medical History:   Diagnosis Date    ADHD (attention deficit hyperactivity disorder)     Dysphagia     schatzki ring    Hyperglycemia     Vitamin D deficiency           Current Outpatient Medications:     tretinoin (RETIN-A) 0.1 % cream, Apply topically nightly., Disp: 1 Tube, Rfl: 3    moxifloxacin (VIGAMOX) 0.5 % ophthalmic solution, One drop in operated eye 4 times per day, start the day before surgery, and put in one drop on morning of surgery. (Patient not taking: Reported on 7/30/2020), Disp: 1 Bottle, Rfl: 0    prednisoLONE acetate (PRED FORTE) 1 % ophthalmic suspension, One drop in operated eye 4 times per day, start the day before surgery, and put in one drop on morning of surgery. (Patient not taking: Reported on 7/30/2020), Disp: 1 Bottle, Rfl: 2    Accu-Chek Softclix Lancets MISC, USE TO TEST BLOOD SUGAR DAILY AND AS NEEDED FOR SYMPTOMS OF IRREGULAR BLOOD SUGAR, Disp: 100 each, Rfl: 1    Blood Glucose Monitoring Suppl (ACCU-CHEK IRVIN PLUS) w/Device KIT, USE TO TEST BLOOD SUGAR DAILY AND AS NEEDED FOR SYMPTOMS OF IRREGULAR BLOOD SUGAR, Disp: 1 kit, Rfl: 0    ACCU-CHEK IRVIN PLUS strip, TEST ONE TIME A DAY AND AS NEEDED FOR SYMPTOMS OF LOW BLOOD SUGAR, Disp: 100 strip, Rfl: 3    blood glucose monitor strips, Test one time a day & as needed for symptoms of irregular blood glucose., Disp: 100 strip, Rfl: 1    Cholecalciferol (VITAMIN D3) 5000 UNITS TABS, Take 5,000 Units by mouth daily, Disp: , Rfl:     co-enzyme Q-10 50 MG capsule, Take 50 mg by mouth daily, Disp: , Rfl:     magnesium oxide (MAG-OX) 400 MG tablet, Take 500 mg by mouth daily , Disp: , Rfl:     Omega-3 300 MG CAPS, Take 300 mg by mouth daily, Disp: , Rfl:      Allergies   Allergen Reactions    Asa [Aspirin]      Nausea and Vomiting    Celexa [Citalopram Hydrobromide]      dizziness    Penicillins Nausea Only    Sulfa Antibiotics     Sulfur Hives    Wasp Venom Other (See Comments)     Swelling, rash, itching, peeling/dermatitis        IMPRESSION:  1. Pseudophakia, both eyes        PLAN:    Patient status post CE IOL OU. Patient doing well. Postoperative drops and reasons to call or return reviewed.   Proper follow-up arranged. Return 1-2 wk post op CE/IOL OU.     Electronically signed by Michael Aranda MD on 8/19/2020 at 9:13 AM

## 2020-08-26 ENCOUNTER — TELEPHONE (OUTPATIENT)
Dept: OPTOMETRY | Age: 75
End: 2020-08-26

## 2020-08-26 ENCOUNTER — OFFICE VISIT (OUTPATIENT)
Dept: OPTOMETRY | Age: 75
End: 2020-08-26
Payer: MEDICARE

## 2020-08-26 PROCEDURE — 99024 POSTOP FOLLOW-UP VISIT: CPT | Performed by: OPTOMETRIST

## 2020-08-26 PROCEDURE — 99211 OFF/OP EST MAY X REQ PHY/QHP: CPT

## 2020-08-26 ASSESSMENT — VISUAL ACUITY
OD_SC: 20/25
OS_SC: 20/20
OS_SC+: -1
METHOD: SNELLEN - LINEAR
OD_SC+: -1

## 2020-08-26 ASSESSMENT — TONOMETRY
OS_IOP_MMHG: 14
IOP_METHOD: NON-CONTACT AIR PUFF
OD_IOP_MMHG: 25

## 2020-08-26 ASSESSMENT — REFRACTION_MANIFEST
OD_AXIS: 152
OD_SPHERE: +0.25
OD_CYLINDER: -1.00

## 2020-08-26 ASSESSMENT — SLIT LAMP EXAM - LIDS
COMMENTS: NORMAL
COMMENTS: NORMAL

## 2020-08-26 NOTE — PROGRESS NOTES
Donnell Red presents today for   Chief Complaint   Patient presents with    Post-Op Check   . HPI     Patient is here for a 1 week Post op check for cataract removel of right eye done 8/18/20 by Dr. Idalia Reich. Left cataract was done 3/5/20 by Dr. Lorraine Howe. Taper as instructed  : use the prednisolone drops in the operated eye 3x per day for one week, 2x per day for one week, 1x per day for one week, then discontinue. Call with any questions or concerns         Taper as instructed  : use the prednisolone drops in the operated eye 3x per day for one week, 2x per day for one week, 1x per day for one week, then discontinue. Call with any questions or concerns . Patient states she is taking her Prednisone drops now three times a day for this week. Current Outpatient Medications   Medication Sig Dispense Refill    moxifloxacin (VIGAMOX) 0.5 % ophthalmic solution Use 1 drop in surgery eye 4 times a day for 1 week 1 Bottle 0    tretinoin (RETIN-A) 0.1 % cream Apply topically nightly. 1 Tube 3    prednisoLONE acetate (PRED FORTE) 1 % ophthalmic suspension One drop in operated eye 4 times per day, start the day before surgery, and put in one drop on morning of surgery. 1 Bottle 2    Accu-Chek Softclix Lancets MISC USE TO TEST BLOOD SUGAR DAILY AND AS NEEDED FOR SYMPTOMS OF IRREGULAR BLOOD SUGAR 100 each 1    Blood Glucose Monitoring Suppl (ACCU-CHEK IRVIN PLUS) w/Device KIT USE TO TEST BLOOD SUGAR DAILY AND AS NEEDED FOR SYMPTOMS OF IRREGULAR BLOOD SUGAR 1 kit 0    ACCU-CHEK IRVIN PLUS strip TEST ONE TIME A DAY AND AS NEEDED FOR SYMPTOMS OF LOW BLOOD SUGAR 100 strip 3    blood glucose monitor strips Test one time a day & as needed for symptoms of irregular blood glucose.  100 strip 1    Cholecalciferol (VITAMIN D3) 5000 UNITS TABS Take 5,000 Units by mouth daily      co-enzyme Q-10 50 MG capsule Take 50 mg by mouth daily      magnesium oxide (MAG-OX) 400 MG tablet Take 500 mg by mouth daily       Omega-3 300 MG CAPS Take 300 mg by mouth daily       No current facility-administered medications for this visit.           Family History   Problem Relation Age of Onset    Cataracts Father     Diabetes Father     Alzheimer's Disease Mother     Diabetes Brother     Diabetes Sister     Glaucoma Neg Hx      Social History     Socioeconomic History    Marital status: Single     Spouse name: None    Number of children: None    Years of education: None    Highest education level: None   Occupational History    None   Social Needs    Financial resource strain: None    Food insecurity     Worry: None     Inability: None    Transportation needs     Medical: None     Non-medical: None   Tobacco Use    Smoking status: Never Smoker    Smokeless tobacco: Never Used   Substance and Sexual Activity    Alcohol use: No    Drug use: No    Sexual activity: Never   Lifestyle    Physical activity     Days per week: None     Minutes per session: None    Stress: None   Relationships    Social connections     Talks on phone: None     Gets together: None     Attends Mor[employer] service: None     Active member of club or organization: None     Attends meetings of clubs or organizations: None     Relationship status: None    Intimate partner violence     Fear of current or ex partner: None     Emotionally abused: None     Physically abused: None     Forced sexual activity: None   Other Topics Concern    None   Social History Narrative    None     Past Medical History:   Diagnosis Date    ADHD (attention deficit hyperactivity disorder)     Dysphagia     schatzki ring    Hyperglycemia     Vitamin D deficiency            Main Ophthalmology Exam     External Exam       Right Left    External Normal Normal          Slit Lamp Exam       Right Left    Lids/Lashes Normal Normal    Conjunctiva/Sclera White and quiet White and quiet    Cornea Clear Clear    Anterior Chamber Deep and quiet Deep and quiet    Iris Round and reactive Round and reactive    Lens Posterior chamber intraocular lens Posterior chamber intraocular lens    Vitreous Normal Normal                   Tonometry     Tonometry (Non-contact air puff, 10:37 AM)       Right Left    Pressure 25 14   IOP.8             12.7  CH:  6.6          9.6  WS: 5.7          6.4                  Not recorded         Not recorded          Visual Acuity (Snellen - Linear)       Right Left    Dist sc 20/25 -1 20/20 -1          Pupils     Pupils       Pupils    Right PERRL    Left PERRL              Neuro/Psych     Neuro/Psych     Oriented x3:  Yes    Mood/Affect:  Normal               Not recorded             Not recorded        Manifest Refraction     Manifest Refraction       Sphere Cylinder Axis Dist [place]    Right +0.25 -1.00 152 20/25+    Left              Manifest Refraction #2 (Auto)       Sphere Cylinder Axis Dist VA    Right +0.50 -1.25 153     Left +0.75 -0.50 001                      1. Postoperative care for cataract of right eye           Patient Instructions   Taper as instructed  : use the prednisolone drops in the operated eye 3x per day for one week, 2x per day for one week, 1x per day for one week, then discontinue. Call with any questions or concerns        Return in about 3 weeks (around 2020) for final post op of the right eye .

## 2020-08-26 NOTE — PATIENT INSTRUCTIONS
Taper as instructed  : use the prednisolone drops in the operated eye 3x per day for one week, 2x per day for one week, 1x per day for one week, then discontinue.     Call with any questions or concerns

## 2020-09-16 ENCOUNTER — OFFICE VISIT (OUTPATIENT)
Dept: OPTOMETRY | Age: 75
End: 2020-09-16
Payer: MEDICARE

## 2020-09-16 PROCEDURE — 99024 POSTOP FOLLOW-UP VISIT: CPT | Performed by: OPTOMETRIST

## 2020-09-16 PROCEDURE — 99211 OFF/OP EST MAY X REQ PHY/QHP: CPT

## 2020-09-16 ASSESSMENT — REFRACTION_MANIFEST
OD_AXIS: 175
OS_CYLINDER: DS
OD_ADD: +2.50
OD_CYLINDER: -1.00
OS_ADD: +2.50
OS_SPHERE: +0.25
OD_SPHERE: +0.75

## 2020-09-16 ASSESSMENT — TONOMETRY
IOP_METHOD: NON-CONTACT AIR PUFF
OS_IOP_MMHG: 11
OD_IOP_MMHG: 19

## 2020-09-16 ASSESSMENT — VISUAL ACUITY
OD_SC: 20/30
OD_PH_SC: 20/25 OU
OS_SC+: -1
OD_SC+: -2
OS_SC: 20/20
METHOD: SNELLEN - LINEAR
OD_SC: 20/40 OU

## 2020-09-16 ASSESSMENT — SLIT LAMP EXAM - LIDS
COMMENTS: NORMAL
COMMENTS: NORMAL

## 2020-09-16 NOTE — PROGRESS NOTES
Jaqueline Villatoro presents today for   Chief Complaint   Patient presents with    Post-Op Check   . HPI     Patient is here for her final Post-op check. Cataract Sx to OD:  8/18/20 by Dr. Jd Bal. OS:  3/5/20 by Dr. Martha Barrera. Patient is using the Prednisolone drops to the right eye at this time. Patient states she has difficulty seeing up close. Eyes are comfortable  Done with drops for the left eye;  Using one drop pred in the right eye          Current Outpatient Medications   Medication Sig Dispense Refill    moxifloxacin (VIGAMOX) 0.5 % ophthalmic solution Use 1 drop in surgery eye 4 times a day for 1 week 1 Bottle 0    tretinoin (RETIN-A) 0.1 % cream Apply topically nightly. 1 Tube 3    prednisoLONE acetate (PRED FORTE) 1 % ophthalmic suspension One drop in operated eye 4 times per day, start the day before surgery, and put in one drop on morning of surgery. 1 Bottle 2    Accu-Chek Softclix Lancets MISC USE TO TEST BLOOD SUGAR DAILY AND AS NEEDED FOR SYMPTOMS OF IRREGULAR BLOOD SUGAR 100 each 1    Blood Glucose Monitoring Suppl (ACCU-CHEK IRVIN PLUS) w/Device KIT USE TO TEST BLOOD SUGAR DAILY AND AS NEEDED FOR SYMPTOMS OF IRREGULAR BLOOD SUGAR 1 kit 0    ACCU-CHEK IRVIN PLUS strip TEST ONE TIME A DAY AND AS NEEDED FOR SYMPTOMS OF LOW BLOOD SUGAR 100 strip 3    blood glucose monitor strips Test one time a day & as needed for symptoms of irregular blood glucose. 100 strip 1    Cholecalciferol (VITAMIN D3) 5000 UNITS TABS Take 5,000 Units by mouth daily      co-enzyme Q-10 50 MG capsule Take 50 mg by mouth daily      magnesium oxide (MAG-OX) 400 MG tablet Take 500 mg by mouth daily       Omega-3 300 MG CAPS Take 300 mg by mouth daily       No current facility-administered medications for this visit.           Family History   Problem Relation Age of Onset    Cataracts Father     Diabetes Father    [facility] Alzheimer's Disease Mother     Diabetes Brother     Diabetes Sister     Glaucoma Neg Hx Social History     Socioeconomic History    Marital status: Single     Spouse name: Not on file    Number of children: Not on file    Years of education: Not on file    Highest education level: Not on file   Occupational History    Not on file   Social Needs    Financial resource strain: Not on file    Food insecurity     Worry: Not on file     Inability: Not on file    Transportation needs     Medical: Not on file     Non-medical: Not on file   Tobacco Use    Smoking status: Never Smoker    Smokeless tobacco: Never Used   Substance and Sexual Activity    Alcohol use: No    Drug use: No    Sexual activity: Never   Lifestyle    Physical activity     Days per week: Not on file     Minutes per session: Not on file    Stress: Not on file   Relationships    Social connections     Talks on phone: Not on file     Gets together: Not on file     Attends Mormonism service: Not on file     Active member of club or organization: Not on file     Attends meetings of clubs or organizations: Not on file     Relationship status: Not on file    Intimate partner violence     Fear of current or ex partner: Not on file     Emotionally abused: Not on file     Physically abused: Not on file     Forced sexual activity: Not on file   Other Topics Concern    Not on file   Social History Narrative    Not on file     Past Medical History:   Diagnosis Date    ADHD (attention deficit hyperactivity disorder)     Dysphagia     schatzki ring    Hyperglycemia     Vitamin D deficiency            Main Ophthalmology Exam     External Exam       Right Left    External Normal Normal          Slit Lamp Exam       Right Left    Lids/Lashes Normal Normal    Conjunctiva/Sclera White and quiet White and quiet    Cornea Clear Clear    Anterior Chamber Deep and quiet Deep and quiet    Iris Round and reactive Round and reactive    Lens Posterior chamber intraocular lens Posterior chamber intraocular lens    Vitreous Normal Normal Fundus Exam       Right Left    Disc Normal Normal    C/D Ratio 0.2 0.2    Macula Normal Normal    Vessels Normal Normal                   Tonometry     Tonometry (Non-contact air puff, 11:25 AM)       Right Left    Pressure 19 11   IOPg:  15.8             9.9  CH:  8.1          10.8  WS: 3.7          5.0                  Not recorded         Not recorded          Visual Acuity (Snellen - Linear)       Right Left    Dist sc 20/30 -2 20/20 -1    Dist ph sc 20/25 OU     Near sc 20/40 OU           Pupils     Pupils       Pupils    Right PERRL    Left PERRL              Neuro/Psych     Neuro/Psych     Oriented x3:  Yes    Mood/Affect:  Normal                      Manifest Refraction     Manifest Refraction       Sphere Cylinder Axis Dist VA Add    Right +0.75 -1.00 175 20/25 +2.50    Left +0.25 ds  20/25 +2.50          Manifest Refraction #2 (Auto)       Sphere Cylinder Axis Dist VA Add    Right +0.75 -1.00 154      Left +0.50 -0.25 016                 Final Rx       Sphere Cylinder Axis Add    Right +0.75 -1.00 175 +2.50    Left +0.25 ds  +2.50    Expiration Date:  9/17/2022      Final Rx #2       Sphere Cylinder Axis Add    Right +3.25 -1.00 175     Left +2.75 ds      Type:  RRX     Expiration Date:  9/17/2022            1. Blurred vision, bilateral    2. Postoperative care for cataract of right eye    3. Postoperative care for cataract of left eye           Patient Instructions   New glasses recommended ;   We can do reading glasses      Return in about 1 year (around 9/16/2021) for complete eye exam.

## 2020-09-21 ENCOUNTER — HOSPITAL ENCOUNTER (OUTPATIENT)
Age: 75
Setting detail: SPECIMEN
Discharge: HOME | End: 2020-09-21
Payer: MEDICARE

## 2020-09-21 ENCOUNTER — OFFICE VISIT (OUTPATIENT)
Dept: PRIMARY CARE CLINIC | Age: 75
End: 2020-09-21
Payer: MEDICARE

## 2020-09-21 VITALS
HEIGHT: 63 IN | DIASTOLIC BLOOD PRESSURE: 60 MMHG | BODY MASS INDEX: 29.09 KG/M2 | SYSTOLIC BLOOD PRESSURE: 98 MMHG | WEIGHT: 164.2 LBS | HEART RATE: 68 BPM | TEMPERATURE: 97.4 F

## 2020-09-21 LAB
-: ABNORMAL
AMORPHOUS: ABNORMAL
BACTERIA: ABNORMAL
BILIRUBIN URINE: NEGATIVE
CASTS UA: ABNORMAL /LPF (ref 0–2)
COLOR: ABNORMAL
COMMENT UA: ABNORMAL
CRYSTALS, UA: ABNORMAL /HPF
EPITHELIAL CELLS UA: ABNORMAL /HPF (ref 0–5)
GLUCOSE URINE: NEGATIVE
KETONES, URINE: NEGATIVE
LEUKOCYTE ESTERASE, URINE: ABNORMAL
MUCUS: ABNORMAL
NITRITE, URINE: NEGATIVE
OTHER OBSERVATIONS UA: ABNORMAL
PH UA: 7 (ref 5–6)
PROTEIN UA: NEGATIVE
RBC UA: ABNORMAL /HPF (ref 0–4)
RENAL EPITHELIAL, UA: ABNORMAL /HPF
SPECIFIC GRAVITY UA: 1.01 (ref 1.01–1.02)
TRICHOMONAS: ABNORMAL
TURBIDITY: ABNORMAL
URINE HGB: ABNORMAL
UROBILINOGEN, URINE: NORMAL
WBC UA: >100 /HPF (ref 0–4)
YEAST: ABNORMAL

## 2020-09-21 PROCEDURE — 81001 URINALYSIS AUTO W/SCOPE: CPT

## 2020-09-21 PROCEDURE — G8417 CALC BMI ABV UP PARAM F/U: HCPCS | Performed by: NURSE PRACTITIONER

## 2020-09-21 PROCEDURE — 1090F PRES/ABSN URINE INCON ASSESS: CPT | Performed by: NURSE PRACTITIONER

## 2020-09-21 PROCEDURE — 4040F PNEUMOC VAC/ADMIN/RCVD: CPT | Performed by: NURSE PRACTITIONER

## 2020-09-21 PROCEDURE — 87088 URINE BACTERIA CULTURE: CPT

## 2020-09-21 PROCEDURE — G8427 DOCREV CUR MEDS BY ELIG CLIN: HCPCS | Performed by: NURSE PRACTITIONER

## 2020-09-21 PROCEDURE — 99212 OFFICE O/P EST SF 10 MIN: CPT

## 2020-09-21 PROCEDURE — 3017F COLORECTAL CA SCREEN DOC REV: CPT | Performed by: NURSE PRACTITIONER

## 2020-09-21 PROCEDURE — 87086 URINE CULTURE/COLONY COUNT: CPT

## 2020-09-21 PROCEDURE — 1123F ACP DISCUSS/DSCN MKR DOCD: CPT | Performed by: NURSE PRACTITIONER

## 2020-09-21 PROCEDURE — 1036F TOBACCO NON-USER: CPT | Performed by: NURSE PRACTITIONER

## 2020-09-21 PROCEDURE — 99213 OFFICE O/P EST LOW 20 MIN: CPT | Performed by: NURSE PRACTITIONER

## 2020-09-21 PROCEDURE — 87186 SC STD MICRODIL/AGAR DIL: CPT

## 2020-09-21 PROCEDURE — G8400 PT W/DXA NO RESULTS DOC: HCPCS | Performed by: NURSE PRACTITIONER

## 2020-09-21 RX ORDER — CIPROFLOXACIN 500 MG/1
500 TABLET, FILM COATED ORAL 2 TIMES DAILY
Qty: 20 TABLET | Refills: 0 | Status: SHIPPED | OUTPATIENT
Start: 2020-09-21 | End: 2020-10-01

## 2020-09-21 ASSESSMENT — ENCOUNTER SYMPTOMS
NAUSEA: 0
VOMITING: 0

## 2020-09-21 ASSESSMENT — PATIENT HEALTH QUESTIONNAIRE - PHQ9
SUM OF ALL RESPONSES TO PHQ QUESTIONS 1-9: 0
SUM OF ALL RESPONSES TO PHQ QUESTIONS 1-9: 0
2. FEELING DOWN, DEPRESSED OR HOPELESS: 0
SUM OF ALL RESPONSES TO PHQ9 QUESTIONS 1 & 2: 0
1. LITTLE INTEREST OR PLEASURE IN DOING THINGS: 0

## 2020-09-21 NOTE — PROGRESS NOTES
Subjective:      Patient ID: Edith Salazar is a 69 y.o. female. Patient had urethral sling procedure for rectocele, then had sling cut due to urinary retention, since then has had urinary frequency/leaking and stool leakage. Urinary Frequency    This is a new problem. The current episode started 1 to 4 weeks ago. The problem occurs intermittently. The problem has been waxing and waning. The patient is experiencing no pain. There has been no fever. She is not sexually active. There is no history of pyelonephritis. Associated symptoms include a discharge (rectal leakage and urine leakage), frequency, hesitancy and urgency. Pertinent negatives include no chills, flank pain, hematuria, nausea, possible pregnancy, sweats or vomiting. She has tried increased fluids for the symptoms. The treatment provided no relief. Her past medical history is significant for a urological procedure (urethral sling for rectocele).      Past Medical History:   Diagnosis Date    ADHD (attention deficit hyperactivity disorder)     Dysphagia     schatzki ring    Hyperglycemia     Vitamin D deficiency      Past Surgical History:   Procedure Laterality Date    BLADDER SUSPENSION  06/24/2020    Dr. Arianna Clement, [facility] 81. COLONOSCOPY  08/12/2013    internal hemorrhoids,two sessile polyps ascending colon,few diverticula noted descending colon and sigmoid colon, per Dr Rosangela Pham at [facility] 63 COLONOSCOPY N/A 5/26/2020    COLONOSCOPY DIAGNOSTIC performed by Fabiola Mcneill MD at [facility]    ENDOSCOPY, COLON, DIAGNOSTIC      INTRACAPSULAR CATARACT EXTRACTION Left 3/5/2020    Left Cataract Extraction w/ IOL Impant performed by Jean Carlos Owen MD at [place] [address] Right 8/18/2020    Right Cataract Extraction w/ IOL Implant performed by Mikki Ascencio MD at [facility] ARTHROSCOPY Left 2006   [facility] OTHER SURGICAL HISTORY  2009    lipoma removed from upper arm    UPPER GASTROINTESTINAL ENDOSCOPY  08/12/2013    fragments of squamous mucosa with minimal,non-specific reactive epithelial changes,negative Hpylori,intestinal metaplasia or dysplasia,negative for active inflammatory changes per Dr Lois Sher at [address] 5/26/2020    EGD ESOPHAGOGASTRODUODENOSCOPY DILATATION [facility] [place] [facility]    UPPER GASTROINTESTINAL ENDOSCOPY Left 5/26/2020    EGD BIOPSY performed by Ekaterina Garcia MD at [facility] [place] [facility], biopsies showed chronic esophagitis, chronically inflamed gastric cardia-type mucosa, no intestinal metaplasia or dysplasia     Family History   Problem Relation Age of Onset    Cataracts Father     Diabetes Father     Alzheimer's Disease Mother     Diabetes Brother     Diabetes Sister     Glaucoma Neg Hx          Review of Systems   Constitutional: Negative for chills and fever. Gastrointestinal: Negative for nausea and vomiting. Genitourinary: Positive for frequency, hesitancy and urgency. Negative for decreased urine volume, flank pain and hematuria. BP 98/60 (Site: Right Upper Arm, Position: Sitting, Cuff Size: Medium Adult)   Pulse 68   Temp 97.4 °F (36.3 °C)   Ht 5' 2.5\" (1.588 m)   Wt 164 lb 3.2 oz (74.5 kg)   Breastfeeding No   BMI 29.55 kg/m²       Objective:   Physical Exam  Vitals signs and nursing note reviewed. Constitutional:       General: She is not in acute distress. Appearance: Normal appearance. She is ill-appearing. She is not toxic-appearing or diaphoretic. HENT:      Head: Normocephalic. Cardiovascular:      Rate and Rhythm: Normal rate and regular rhythm. Heart sounds: Normal heart sounds. Pulmonary:      Effort: Pulmonary effort is normal.      Breath sounds: Normal breath sounds. Abdominal:      Palpations: Abdomen is soft. Tenderness: There is no abdominal tenderness. There is no right CVA tenderness or left CVA tenderness.    Skin: General: Skin is warm and dry. Capillary Refill: Capillary refill takes less than 2 seconds. Neurological:      Mental Status: She is alert and oriented to person, place, and time. Psychiatric:         Mood and Affect: Mood normal.         Behavior: Behavior normal.       Hospital Outpatient Visit on 09/21/2020   Component Date Value Ref Range Status    Color, UA 09/21/2020 NOT REPORTED  YELLOW Final    Turbidity UA 09/21/2020 NOT REPORTED  CLEAR Final    Glucose, Ur 09/21/2020 NEGATIVE  NEGATIVE Final    Bilirubin Urine 09/21/2020 NEGATIVE  NEGATIVE Final    Ketones, Urine 09/21/2020 NEGATIVE  NEGATIVE Final    Specific [place], UA 09/21/2020 1.015  1.010 - 1.025 Final    Urine Hgb 09/21/2020 TRACE* NEGATIVE Final    pH, UA 09/21/2020 7.0* 5.0 - 6.0 Final    Protein, UA 09/21/2020 NEGATIVE  NEGATIVE Final    Urobilinogen, Urine 09/21/2020 Normal  Normal Final    Nitrite, Urine 09/21/2020 NEGATIVE  NEGATIVE Final    Leukocyte Esterase, Urine 09/21/2020 2+* NEGATIVE Final    Urinalysis Comments 09/21/2020 NOT REPORTED   Final    - 09/21/2020        Final    WBC, UA 09/21/2020 >100  0 - 4 /HPF Final    RBC, UA 09/21/2020 5 TO 10  0 - 4 /HPF Final    Casts UA 09/21/2020 NOT REPORTED  0 - 2 /LPF Final    Crystals, UA 09/21/2020 NOT REPORTED  None /HPF Final    Epithelial Cells UA 09/21/2020 0 TO 4  0 - 5 /HPF Final    Renal Epithelial, UA 09/21/2020 NOT REPORTED  0 /HPF Final    Bacteria, UA 09/21/2020 3+* None Final    Mucus, UA 09/21/2020 NOT REPORTED  None Final    Trichomonas, UA 09/21/2020 NOT REPORTED  None Final    Amorphous, UA 09/21/2020 NOT REPORTED  None Final    Other Observations UA 09/21/2020 Specimen Cultured* NOT REQ. Final    Yeast, UA 09/21/2020 NOT REPORTED  None Final    Specimen Description 09/21/2020 . CLEAN CATCH URINE   Preliminary    Special Requests 09/21/2020 NOT REPORTED   Preliminary    Culture 09/21/2020 ESCHERICHIA COLI >[id number] CFU/ML*  Preliminary Hospital Outpatient Visit on 08/13/2020   Component Date Value Ref Range Status    SARS-CoV-2, BETTE 08/13/2020 Not Detected  Not Detected Final    Comment: (NOTE)  This test was developed and its performance characteristics  determined by [facility]. This test has not been FDA  cleared or approved. This test has been authorized by FDA under an  Emergency Use Authorization (EUA). This test is only authorized for  the duration of time the declaration that circumstances exist  justifying the authorization of the emergency use of in vitro  diagnostic tests for detection of SARS-CoV-2 virus and/or diagnosis  of COVID-19 infection under section 564(b)(1) of the Act, 21 U. S.C.  726FOA-3(S)(6), unless the authorization is terminated or revoked  sooner. When diagnostic testing is negative, the possibility of a  false negative result should be considered in the context of a  patient's recent exposures and the presence of clinical signs and  symptoms consistent with COVID-19. An individual without symptoms of  COVID-19 and who is not shedding SARS-CoV-2 virus would expect to  have a negative (not detected) result in this assay. Performed                            At:  [address] [id number]  [address] ID:[id number]     Hospital Outpatient Visit on 07/27/2020   Component Date Value Ref Range Status    Hemoglobin A1C 07/27/2020 5.2  4.8 - 5.9 % Final    Estimated Avg Glucose 07/27/2020 103  mg/dL Final    Glucose 07/27/2020 94  70 - 99 mg/dL Final    BUN 07/27/2020 17  8 - 23 mg/dL Final    CREATININE 07/27/2020 0.74  0.50 - 0.90 mg/dL Final    Bun/Cre Ratio 07/27/2020 23* 9 - 20 Final    Calcium 07/27/2020 9.0  8.6 - 10.4 mg/dL Final    Sodium 07/27/2020 139  135 - 144 mmol/L Final    Potassium 07/27/2020 4.1  3.7 - 5.3 mmol/L Final    Chloride 07/27/2020 100  98 - 107 mmol/L Final    CO2 07/27/2020 26  20 - 31 mmol/L Final    Anion Gap

## 2020-09-22 LAB
CULTURE: ABNORMAL
Lab: ABNORMAL
SPECIMEN DESCRIPTION: ABNORMAL

## 2021-01-27 DIAGNOSIS — R73.9 HYPERGLYCEMIA: ICD-10-CM

## 2021-01-27 RX ORDER — GLUCOSAMINE HCL/CHONDROITIN SU 500-400 MG
CAPSULE ORAL
Qty: 100 STRIP | Refills: 1 | Status: SHIPPED | OUTPATIENT
Start: 2021-01-27 | End: 2021-03-30 | Stop reason: SDUPTHER

## 2021-03-30 DIAGNOSIS — R73.9 HYPERGLYCEMIA: ICD-10-CM

## 2021-03-30 RX ORDER — GLUCOSAMINE HCL/CHONDROITIN SU 500-400 MG
CAPSULE ORAL
Qty: 100 STRIP | Refills: 1 | Status: SHIPPED | OUTPATIENT
Start: 2021-03-30 | End: 2021-05-04 | Stop reason: SDUPTHER

## 2021-03-30 NOTE — TELEPHONE ENCOUNTER
The phone number provided went straight to voicemail. unable to leave voicemail, voice mailbox was full. No other number listed as a contact. Will try again at a later time.

## 2021-03-30 NOTE — TELEPHONE ENCOUNTER
Dorothy Dangelo called requesting a refill of the below medication which has been pended for you:     Requested Prescriptions     Pending Prescriptions Disp Refills    blood glucose monitor strips 100 strip 1     Sig: Test one time a day & as needed for symptoms of irregular blood glucose.        Last Appointment Date: 7/30/2020  Next Appointment Date: Visit date not found    Allergies   Allergen Reactions    Asa [Aspirin]      Nausea and Vomiting    Celexa [Citalopram Hydrobromide]      dizziness    Penicillins Nausea Only    Sulfa Antibiotics     Sulfur Hives    Wasp Venom Other (See Comments)     Swelling, rash, itching, peeling/dermatitis

## 2021-04-29 ENCOUNTER — TELEPHONE (OUTPATIENT)
Dept: FAMILY MEDICINE CLINIC | Age: 76
End: 2021-04-29

## 2021-04-29 DIAGNOSIS — E03.9 HYPOTHYROIDISM, UNSPECIFIED TYPE: Primary | ICD-10-CM

## 2021-04-29 NOTE — TELEPHONE ENCOUNTER
Patient is questioning why thyroid labs are not ordered and she is on the schedule for labs tomorrow.  Please review

## 2021-04-30 ENCOUNTER — NURSE ONLY (OUTPATIENT)
Dept: LAB | Age: 76
End: 2021-04-30
Payer: MEDICARE

## 2021-04-30 ENCOUNTER — HOSPITAL ENCOUNTER (OUTPATIENT)
Dept: LAB | Age: 76
Discharge: HOME | End: 2021-04-30
Payer: MEDICARE

## 2021-04-30 DIAGNOSIS — E55.9 VITAMIN D DEFICIENCY: ICD-10-CM

## 2021-04-30 DIAGNOSIS — Z11.1 VISIT FOR MANTOUX TEST: Primary | ICD-10-CM

## 2021-04-30 DIAGNOSIS — E03.9 HYPOTHYROIDISM, UNSPECIFIED TYPE: ICD-10-CM

## 2021-04-30 DIAGNOSIS — E66.3 OVERWEIGHT (BMI 25.0-29.9): ICD-10-CM

## 2021-04-30 DIAGNOSIS — R73.09 ELEVATED GLUCOSE: ICD-10-CM

## 2021-04-30 LAB
ALBUMIN SERPL-MCNC: 4 G/DL (ref 3.5–5.2)
ALBUMIN/GLOBULIN RATIO: 1.2 (ref 1–2.5)
ALP BLD-CCNC: 69 U/L (ref 35–104)
ALT SERPL-CCNC: 14 U/L (ref 5–33)
ANION GAP SERPL CALCULATED.3IONS-SCNC: 8 MMOL/L (ref 9–17)
AST SERPL-CCNC: 18 U/L
BILIRUB SERPL-MCNC: 0.45 MG/DL (ref 0.3–1.2)
BUN BLDV-MCNC: 15 MG/DL (ref 8–23)
BUN/CREAT BLD: 22 (ref 9–20)
CALCIUM SERPL-MCNC: 9.3 MG/DL (ref 8.6–10.4)
CHLORIDE BLD-SCNC: 103 MMOL/L (ref 98–107)
CHOLESTEROL/HDL RATIO: 1.9
CHOLESTEROL: 110 MG/DL
CO2: 30 MMOL/L (ref 20–31)
CREAT SERPL-MCNC: 0.68 MG/DL (ref 0.5–0.9)
ESTIMATED AVERAGE GLUCOSE: 108 MG/DL
GFR AFRICAN AMERICAN: >60 ML/MIN
GFR NON-AFRICAN AMERICAN: >60 ML/MIN
GFR SERPL CREATININE-BSD FRML MDRD: ABNORMAL ML/MIN/{1.73_M2}
GFR SERPL CREATININE-BSD FRML MDRD: ABNORMAL ML/MIN/{1.73_M2}
GLUCOSE BLD-MCNC: 84 MG/DL (ref 70–99)
HBA1C MFR BLD: 5.4 % (ref 4–6)
HDLC SERPL-MCNC: 58 MG/DL
LDL CHOLESTEROL: 41 MG/DL (ref 0–130)
POTASSIUM SERPL-SCNC: 3.8 MMOL/L (ref 3.7–5.3)
SODIUM BLD-SCNC: 141 MMOL/L (ref 135–144)
THYROXINE, FREE: 1.36 NG/DL (ref 0.93–1.7)
TOTAL PROTEIN: 7.4 G/DL (ref 6.4–8.3)
TRIGL SERPL-MCNC: 56 MG/DL
TSH SERPL DL<=0.05 MIU/L-ACNC: 3.41 MIU/L (ref 0.3–5)
VITAMIN D 25-HYDROXY: 47.1 NG/ML (ref 30–100)
VLDLC SERPL CALC-MCNC: NORMAL MG/DL (ref 1–30)

## 2021-04-30 PROCEDURE — 80061 LIPID PANEL: CPT

## 2021-04-30 PROCEDURE — 36415 COLL VENOUS BLD VENIPUNCTURE: CPT

## 2021-04-30 PROCEDURE — 82306 VITAMIN D 25 HYDROXY: CPT

## 2021-04-30 PROCEDURE — 84439 ASSAY OF FREE THYROXINE: CPT

## 2021-04-30 PROCEDURE — 80053 COMPREHEN METABOLIC PANEL: CPT

## 2021-04-30 PROCEDURE — 86580 TB INTRADERMAL TEST: CPT

## 2021-04-30 PROCEDURE — 83036 HEMOGLOBIN GLYCOSYLATED A1C: CPT

## 2021-04-30 PROCEDURE — 84443 ASSAY THYROID STIM HORMONE: CPT

## 2021-05-03 LAB
INDURATION: NORMAL
TB SKIN TEST: 0

## 2021-05-04 ENCOUNTER — HOSPITAL ENCOUNTER (OUTPATIENT)
Dept: GENERAL RADIOLOGY | Age: 76
Discharge: HOME | End: 2021-05-06
Payer: MEDICARE

## 2021-05-04 ENCOUNTER — OFFICE VISIT (OUTPATIENT)
Dept: FAMILY MEDICINE CLINIC | Age: 76
End: 2021-05-04
Payer: MEDICARE

## 2021-05-04 ENCOUNTER — TELEPHONE (OUTPATIENT)
Dept: FAMILY MEDICINE CLINIC | Age: 76
End: 2021-05-04

## 2021-05-04 VITALS
HEART RATE: 77 BPM | TEMPERATURE: 97.9 F | OXYGEN SATURATION: 98 % | BODY MASS INDEX: 28 KG/M2 | WEIGHT: 158 LBS | SYSTOLIC BLOOD PRESSURE: 122 MMHG | DIASTOLIC BLOOD PRESSURE: 72 MMHG | HEIGHT: 63 IN

## 2021-05-04 DIAGNOSIS — M25.511 RIGHT SHOULDER PAIN, UNSPECIFIED CHRONICITY: ICD-10-CM

## 2021-05-04 DIAGNOSIS — Z02.89 ENCOUNTER FOR PHYSICAL EXAMINATION RELATED TO EMPLOYMENT: ICD-10-CM

## 2021-05-04 DIAGNOSIS — F90.9 ATTENTION DEFICIT HYPERACTIVITY DISORDER (ADHD), UNSPECIFIED ADHD TYPE: ICD-10-CM

## 2021-05-04 DIAGNOSIS — M85.852 OSTEOPENIA OF LEFT HIP: ICD-10-CM

## 2021-05-04 DIAGNOSIS — M19.011 ARTHRITIS OF RIGHT SHOULDER REGION: Primary | ICD-10-CM

## 2021-05-04 DIAGNOSIS — Z12.31 ENCOUNTER FOR SCREENING MAMMOGRAM FOR BREAST CANCER: ICD-10-CM

## 2021-05-04 DIAGNOSIS — R73.09 ELEVATED GLUCOSE: Primary | ICD-10-CM

## 2021-05-04 DIAGNOSIS — E66.3 OVERWEIGHT: ICD-10-CM

## 2021-05-04 PROCEDURE — G8417 CALC BMI ABV UP PARAM F/U: HCPCS | Performed by: FAMILY MEDICINE

## 2021-05-04 PROCEDURE — 1036F TOBACCO NON-USER: CPT | Performed by: FAMILY MEDICINE

## 2021-05-04 PROCEDURE — 4040F PNEUMOC VAC/ADMIN/RCVD: CPT | Performed by: FAMILY MEDICINE

## 2021-05-04 PROCEDURE — 99213 OFFICE O/P EST LOW 20 MIN: CPT

## 2021-05-04 PROCEDURE — 73030 X-RAY EXAM OF SHOULDER: CPT

## 2021-05-04 PROCEDURE — 99214 OFFICE O/P EST MOD 30 MIN: CPT | Performed by: FAMILY MEDICINE

## 2021-05-04 PROCEDURE — G8427 DOCREV CUR MEDS BY ELIG CLIN: HCPCS | Performed by: FAMILY MEDICINE

## 2021-05-04 PROCEDURE — 1090F PRES/ABSN URINE INCON ASSESS: CPT | Performed by: FAMILY MEDICINE

## 2021-05-04 PROCEDURE — G8400 PT W/DXA NO RESULTS DOC: HCPCS | Performed by: FAMILY MEDICINE

## 2021-05-04 PROCEDURE — 3017F COLORECTAL CA SCREEN DOC REV: CPT | Performed by: FAMILY MEDICINE

## 2021-05-04 PROCEDURE — 1123F ACP DISCUSS/DSCN MKR DOCD: CPT | Performed by: FAMILY MEDICINE

## 2021-05-04 RX ORDER — LANCETS
EACH MISCELLANEOUS
Qty: 100 EACH | Refills: 1 | Status: SHIPPED | OUTPATIENT
Start: 2021-05-04 | End: 2021-11-02

## 2021-05-04 RX ORDER — GLUCOSAMINE HCL/CHONDROITIN SU 500-400 MG
CAPSULE ORAL
Qty: 100 STRIP | Refills: 1 | Status: SHIPPED | OUTPATIENT
Start: 2021-05-04 | End: 2021-10-21

## 2021-05-04 RX ORDER — DEXTROAMPHETAMINE SACCHARATE, AMPHETAMINE ASPARTATE MONOHYDRATE, DEXTROAMPHETAMINE SULFATE AND AMPHETAMINE SULFATE 2.5; 2.5; 2.5; 2.5 MG/1; MG/1; MG/1; MG/1
10 CAPSULE, EXTENDED RELEASE ORAL DAILY
Qty: 30 CAPSULE | Refills: 0 | Status: SHIPPED | OUTPATIENT
Start: 2021-05-04 | End: 2021-12-13 | Stop reason: SDUPTHER

## 2021-05-04 ASSESSMENT — PATIENT HEALTH QUESTIONNAIRE - PHQ9
2. FEELING DOWN, DEPRESSED OR HOPELESS: 0
SUM OF ALL RESPONSES TO PHQ QUESTIONS 1-9: 0
1. LITTLE INTEREST OR PLEASURE IN DOING THINGS: 0

## 2021-05-04 NOTE — PROGRESS NOTES
[address], [address] [place] [address] Drive                        Telephone [phone]             Fax [phone]     Diana Brown  1945  MRN:  [id number]  Date of visit:  5/4/2021    Subjective:    Diana Brown is a 70 y.o. female who presents to [facility] today (5/4/2021) for follow up/evaluation of:  Shoulder Pain (Right shoulder pain x 6 weeks. Previous injury), Blood Sugar Problem (elevated fasting glucose 10 month follow ), Employment Physical (physical for employment), and Other (talk about ADHD and medication)      She is here today for a physical for work. 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 She states that she had been exercising, but she has not exercised recently due to right shoulder pain. She has had pain in the right shoulder for about 6 weeks. She denies injury. She had labs done last week. She denies chest pain or shortness of breath with activity or at rest.  She has been checking her glucose at home. She requests a prescription for Adderall. She has used this in the past for ADHD, but she has run out. She has received both doses of the Bradford Peter Covid-19 vaccine. She has the following problem list:  Patient Active Problem List   Diagnosis    Vitamin D deficiency    Overweight (BMI 25.0-29. 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  Cholesterol 04/30/2021 110  <200 mg/dL Final    Comment:    Cholesterol Guidelines:      <200  Desirable   200-240  Borderline      >240  Undesirable         HDL 04/30/2021 58  >40 mg/dL Final    Comment:    HDL Guidelines:    <40     Undesirable   40-59    Borderline    >59     Desirable         LDL Cholesterol 04/30/2021 41  0 - 130 mg/dL Final    Comment:    LDL Guidelines:     <100    Desirable   100-129   Near to/above Desirable   130-159   Borderline      >159   Undesirable     Direct (measured) LDL and calculated LDL are not interchangeable tests.  Chol/HDL Ratio 04/30/2021 1.9  <5 Final            Triglycerides 04/30/2021 56  <150 mg/dL Final    Comment:    Triglyceride Guidelines:     <150   Desirable   150-199  Borderline   200-499  High     >499   Very high   Based on AHA Guidelines for fasting triglyceride, October 2012.  VLDL 04/30/2021 NOT REPORTED  1 - 30 mg/dL Final   Nurse Only on 04/30/2021   Component Date Value Ref Range Status    TB Skin Test 05/03/2021 0   Final    Induration 05/03/2021 neg   Final         Assessment and Plan:    1. Elevated glucose  Her fasting glucose and HgbA1c are both within normal limits on her recent lab work. I recommended that she decrease her intake of processed carbohydrates, begin a regular exercise program, and attempt to lose weight. Labs were ordered to be done in 6 months:   - Comprehensive Metabolic Panel; Future  - Lipid Panel; Future  - Hemoglobin A1C; Future    Test strips and lancets were refilled:  - blood glucose monitor strips; Test one time a day & as needed for symptoms of irregular blood glucose. Dispense: 100 strip;  Refill: 1  - Accu-Chek Softclix Lancets MISC; USE TO TEST BLOOD SUGAR DAILY AND AS NEEDED FOR SYMPTOMS OF IRREGULAR BLOOD SUGAR  Dispense: 100 each; Refill: 1    2. Attention deficit hyperactivity disorder (ADHD), unspecified ADHD type  Controlled substances monitoring: No signs of potential drug abuse or diversion identified when [place] from [place], [place], and [place] was reviewed today. The activity on the report was consistent with the treatment plan. She has not had a recent prescription for Adderall. Her last prescription for Adderall was in 2017. She signed a medication agreement today. Adderall was refilled:  - amphetamine-dextroamphetamine (ADDERALL XR) 10 MG extended release capsule; Take 1 capsule by mouth daily for 30 days. Dispense: 30 capsule; Refill: 0    I have asked her to return in one month for follow up. 3. Osteopenia of left hip  She is due for a DEXA scan. This was recommended and ordered:  - DEXA AXIAL SKELETON W VERTEBRAL FX ASST; Future    4. Right shoulder pain, unspecified chronicity  - XR SHOULDER RIGHT (MIN 2 VIEWS); Future    She will be contacted when the radiologist's interpretation of her x-rays is available. 5. Overweight   - Lipid Panel; Future prior to her return visit in 6 months. 6. Encounter for physical examination related to employment  Paperwork for her employer was completed. 7. Routine health maintenance  Health maintenance was reviewed with the patient. She has received both doses of the Bradford Peter Covid-19 vaccine. Screening mammogram was recommended and ordered. All other health maintenance, including Shingrix, Tdap, Pneumovax, and Prevnar-13, is up to date at this time.        (Please note that portions of this note were completed with a voice-recognition program. Efforts were made to edit the dictation but occasionally words are mis-transcribed.)

## 2021-05-04 NOTE — TELEPHONE ENCOUNTER
----- Message from Kimberlee Quintana MD sent at 5/4/2021 11:46 AM EDT -----  Please advise Dc Mazariegos that the x-ray showed arthritis in the shoulder. She may benefit from physical therapy.   Please order a referral.

## 2021-05-04 NOTE — PATIENT INSTRUCTIONS
Hospital Outpatient Visit on 04/30/2021   Component Date Value Ref Range Status    Thyroxine, Free 04/30/2021 1.36  0.93 - 1.70 ng/dL Final    TSH 04/30/2021 3.41  0.30 - 5.00 mIU/L Final    Vit D, 25-Hydroxy 04/30/2021 47.1  30.0 - 100.0 ng/mL Final    Comment:    Reference Range:  Vitamin D status         Range   Deficiency              <20 ng/mL   Mild Deficiency       20-30 ng/mL   Sufficiency           ng/mL   Toxicity               >100 ng/mL      Glucose 04/30/2021 84  70 - 99 mg/dL Final    BUN 04/30/2021 15  8 - 23 mg/dL Final    CREATININE 04/30/2021 0.68  0.50 - 0.90 mg/dL Final    Bun/Cre Ratio 04/30/2021 22* 9 - 20 Final    Calcium 04/30/2021 9.3  8.6 - 10.4 mg/dL Final    Sodium 04/30/2021 141  135 - 144 mmol/L Final    Potassium 04/30/2021 3.8  3.7 - 5.3 mmol/L Final    Chloride 04/30/2021 103  98 - 107 mmol/L Final    CO2 04/30/2021 30  20 - 31 mmol/L Final    Anion Gap 04/30/2021 8* 9 - 17 mmol/L Final    Alkaline Phosphatase 04/30/2021 69  35 - 104 U/L Final    ALT 04/30/2021 14  5 - 33 U/L Final    AST 04/30/2021 18  <32 U/L Final    Total Bilirubin 04/30/2021 0.45  0.3 - 1.2 mg/dL Final    Total Protein 04/30/2021 7.4  6.4 - 8.3 g/dL Final    Albumin 04/30/2021 4.0  3.5 - 5.2 g/dL Final    Albumin/Globulin Ratio 04/30/2021 1.2  1.0 - 2.5 Final    GFR Non- 04/30/2021 >60  >60 mL/min Final    GFR  04/30/2021 >60  >60 mL/min Final    GFR Comment 04/30/2021        Final    Comment: Average GFR for 79or more years old:   76 mL/min/1.73sq m  Chronic Kidney Disease:   <60 mL/min/1.73sq m  Kidney failure:   <15 mL/min/1.73sq m              eGFR calculated using average adult body mass.  Additional eGFR calculator available at:        [employer]            GFR Staging 04/30/2021 NOT REPORTED   Final    Hemoglobin A1C 04/30/2021 5.4  4.0 - 6.0 % Final    Estimated Avg Glucose 04/30/2021 108  mg/dL Final Comment: The ADA and AACC recommend providing the estimated average glucose result to permit better   patient understanding of their HBA1c result.  Cholesterol 04/30/2021 110  <200 mg/dL Final    Comment:    Cholesterol Guidelines:      <200  Desirable   200-240  Borderline      >240  Undesirable         HDL 04/30/2021 58  >40 mg/dL Final    Comment:    HDL Guidelines:    <40     Undesirable   40-59    Borderline    >59     Desirable         LDL Cholesterol 04/30/2021 41  0 - 130 mg/dL Final    Comment:    LDL Guidelines:     <100    Desirable   100-129   Near to/above Desirable   130-159   Borderline      >159   Undesirable     Direct (measured) LDL and calculated LDL are not interchangeable tests.  Chol/HDL Ratio 04/30/2021 1.9  <5 Final            Triglycerides 04/30/2021 56  <150 mg/dL Final    Comment:    Triglyceride Guidelines:     <150   Desirable   150-199  Borderline   200-499  High     >499   Very high   Based on AHA Guidelines for fasting triglyceride, October 2012.          VLDL 04/30/2021 NOT REPORTED  1 - 30 mg/dL Final

## 2021-05-11 ENCOUNTER — HOSPITAL ENCOUNTER (OUTPATIENT)
Dept: GENERAL RADIOLOGY | Age: 76
Discharge: HOME | End: 2021-05-13
Payer: MEDICARE

## 2021-05-11 ENCOUNTER — HOSPITAL ENCOUNTER (OUTPATIENT)
Dept: PHYSICAL THERAPY | Age: 76
Setting detail: THERAPIES SERIES
Discharge: HOME | End: 2021-05-11
Payer: MEDICARE

## 2021-05-11 ENCOUNTER — OFFICE VISIT (OUTPATIENT)
Dept: PRIMARY CARE CLINIC | Age: 76
End: 2021-05-11
Payer: MEDICARE

## 2021-05-11 VITALS
SYSTOLIC BLOOD PRESSURE: 130 MMHG | HEART RATE: 68 BPM | DIASTOLIC BLOOD PRESSURE: 74 MMHG | OXYGEN SATURATION: 98 % | WEIGHT: 166 LBS | RESPIRATION RATE: 16 BRPM | BODY MASS INDEX: 29.41 KG/M2 | HEIGHT: 63 IN | TEMPERATURE: 97.2 F

## 2021-05-11 DIAGNOSIS — M70.62 TROCHANTERIC BURSITIS OF LEFT HIP: Primary | ICD-10-CM

## 2021-05-11 DIAGNOSIS — M70.62 TROCHANTERIC BURSITIS OF LEFT HIP: ICD-10-CM

## 2021-05-11 PROCEDURE — 1123F ACP DISCUSS/DSCN MKR DOCD: CPT | Performed by: FAMILY MEDICINE

## 2021-05-11 PROCEDURE — 3017F COLORECTAL CA SCREEN DOC REV: CPT | Performed by: FAMILY MEDICINE

## 2021-05-11 PROCEDURE — G8400 PT W/DXA NO RESULTS DOC: HCPCS | Performed by: FAMILY MEDICINE

## 2021-05-11 PROCEDURE — G8427 DOCREV CUR MEDS BY ELIG CLIN: HCPCS | Performed by: FAMILY MEDICINE

## 2021-05-11 PROCEDURE — 99212 OFFICE O/P EST SF 10 MIN: CPT | Performed by: FAMILY MEDICINE

## 2021-05-11 PROCEDURE — 97110 THERAPEUTIC EXERCISES: CPT | Performed by: PHYSICAL THERAPIST

## 2021-05-11 PROCEDURE — 73502 X-RAY EXAM HIP UNI 2-3 VIEWS: CPT

## 2021-05-11 PROCEDURE — 97161 PT EVAL LOW COMPLEX 20 MIN: CPT | Performed by: PHYSICAL THERAPIST

## 2021-05-11 PROCEDURE — 4040F PNEUMOC VAC/ADMIN/RCVD: CPT | Performed by: FAMILY MEDICINE

## 2021-05-11 PROCEDURE — 1036F TOBACCO NON-USER: CPT | Performed by: FAMILY MEDICINE

## 2021-05-11 PROCEDURE — 99214 OFFICE O/P EST MOD 30 MIN: CPT | Performed by: FAMILY MEDICINE

## 2021-05-11 PROCEDURE — 1090F PRES/ABSN URINE INCON ASSESS: CPT | Performed by: FAMILY MEDICINE

## 2021-05-11 PROCEDURE — 20610 DRAIN/INJ JOINT/BURSA W/O US: CPT | Performed by: FAMILY MEDICINE

## 2021-05-11 PROCEDURE — G8417 CALC BMI ABV UP PARAM F/U: HCPCS | Performed by: FAMILY MEDICINE

## 2021-05-11 RX ORDER — TRIAMCINOLONE ACETONIDE 40 MG/ML
40 INJECTION, SUSPENSION INTRA-ARTICULAR; INTRAMUSCULAR ONCE
Status: COMPLETED | OUTPATIENT
Start: 2021-05-11 | End: 2021-05-11

## 2021-05-11 RX ADMIN — TRIAMCINOLONE ACETONIDE 40 MG: 200 INJECTION, SUSPENSION INTRA-ARTICULAR; INTRAMUSCULAR at 11:20

## 2021-05-11 ASSESSMENT — PATIENT HEALTH QUESTIONNAIRE - PHQ9
SUM OF ALL RESPONSES TO PHQ9 QUESTIONS 1 & 2: 0
2. FEELING DOWN, DEPRESSED OR HOPELESS: 0
SUM OF ALL RESPONSES TO PHQ QUESTIONS 1-9: 0

## 2021-05-11 ASSESSMENT — ENCOUNTER SYMPTOMS
WHEEZING: 0
CHEST TIGHTNESS: 0
COUGH: 0
SHORTNESS OF BREATH: 0

## 2021-05-11 ASSESSMENT — PAIN DESCRIPTION - FREQUENCY: FREQUENCY: INTERMITTENT

## 2021-05-11 ASSESSMENT — PAIN DESCRIPTION - DESCRIPTORS: DESCRIPTORS: ACHING;RADIATING

## 2021-05-11 ASSESSMENT — PAIN DESCRIPTION - PROGRESSION: CLINICAL_PROGRESSION: GRADUALLY WORSENING

## 2021-05-11 ASSESSMENT — PAIN DESCRIPTION - ORIENTATION: ORIENTATION: RIGHT

## 2021-05-11 ASSESSMENT — PAIN DESCRIPTION - LOCATION: LOCATION: SHOULDER;ARM

## 2021-05-11 ASSESSMENT — PAIN - FUNCTIONAL ASSESSMENT: PAIN_FUNCTIONAL_ASSESSMENT: PREVENTS OR INTERFERES WITH MANY ACTIVE NOT PASSIVE ACTIVITIES

## 2021-05-11 ASSESSMENT — PAIN SCALES - GENERAL: PAINLEVEL_OUTOF10: 6

## 2021-05-11 NOTE — PROGRESS NOTES
FOR SYMPTOMS OF IRREGULAR BLOOD SUGAR 1 kit 0    Cholecalciferol (VITAMIN D3) 5000 UNITS TABS Take 5,000 Units by mouth daily      co-enzyme Q-10 50 MG capsule Take 50 mg by mouth daily      magnesium oxide (MAG-OX) 400 MG tablet Take 500 mg by mouth daily       Omega-3 300 MG CAPS Take 300 mg by mouth daily       No current facility-administered medications for this visit. Allergies   Allergen Reactions    Asa [Aspirin]      Nausea and Vomiting    Celexa [Citalopram Hydrobromide]      dizziness    Penicillins Nausea Only    Sulfa Antibiotics     Sulfur Hives    Wasp Venom Other (See Comments)     Swelling, rash, itching, peeling/dermatitis       Subjective:     Review of Systems   Constitutional: Negative for activity change, appetite change, chills, fatigue and fever. Respiratory: Negative for cough, chest tightness, shortness of breath and wheezing. Cardiovascular: Negative for chest pain, palpitations and leg swelling. Musculoskeletal: Positive for arthralgias (left hip) and gait problem. Negative for joint swelling. Neurological: Negative for dizziness, tingling, syncope, weakness, light-headedness and numbness. Objective:      Physical Exam  Vitals signs and nursing note reviewed. Constitutional:       General: She is not in acute distress. Appearance: She is well-developed. Eyes:      Conjunctiva/sclera: Conjunctivae normal.   Neck:      Musculoskeletal: Normal range of motion and neck supple. Thyroid: No thyromegaly. Cardiovascular:      Rate and Rhythm: Normal rate and regular rhythm. Heart sounds: Normal heart sounds. No murmur. Pulmonary:      Effort: Pulmonary effort is normal. No respiratory distress. Breath sounds: Normal breath sounds. No wheezing. Musculoskeletal:      Left hip: She exhibits tenderness (point tender over the trochanteric bursa).  She exhibits normal range of motion, normal strength, no swelling, no crepitus and no deformity. Lymphadenopathy:      Cervical: No cervical adenopathy. Skin:     General: Skin is warm and dry. Findings: No erythema or rash. Neurological:      Mental Status: She is alert and oriented to person, place, and time. /74   Pulse 68   Temp 97.2 °F (36.2 °C)   Resp 16   Ht 5' 3\" (1.6 m)   Wt 166 lb (75.3 kg)   SpO2 98%   BMI 29.41 kg/m²     Assessment:       Diagnosis Orders   1. Trochanteric bursitis of left hip  XR HIP 2-3 VW W PELVIS LEFT    triamcinolone acetonide (KENALOG-40) injection 40 mg    AL ARTHROCENTESIS ASPIR&/INJ MAJOR JT/BURSA W/O US             Plan:        Trochanteric bursitis: new; she has trochanteric bursitis so I injected her left hip. I explained that the lidocaine should decrease the pain now but the steroid will start to work in 2-3 days. The injection should work for several months. she was encouraged to avoid NSAIDs for the next several days. she did notice some pain relief immediately following the injection. She wanted to do an xray to be sure that there was no arthritis. Procedure Note    PREOP DIAGNOSIS:  [] Right [x]  Left    [] Bilateral Hip Pain    POSTOP DIAGNOSIS:  [] Right [x]  Left    [] Bilateral Hip Pain    OPERATION:  [] Right [x]  Left    [] Bilateral Greater trochanter injection    PROCEDURE:  After sterile prep, 40 mg of Kenalog and 2 mL of 2% lidocaine without epi was injected into the bursa without incident. Pre- and post neurovascular status verified. No complications noted. Return if symptoms worsen or fail to improve. Orders Placed This Encounter   Procedures    XR HIP 2-3 VW W PELVIS LEFT     Standing Status:   Future     Standing Expiration Date:   5/11/2022    AL ARTHROCENTESIS ASPIR&/INJ MAJOR JT/BURSA W/O US     Orders Placed This Encounter   Medications    triamcinolone acetonide (KENALOG-40) injection 40 mg       Patientgiven educational materials - see patient instructions.   Discussed use, benefit,and side effects of prescribed medications. All patient questions answered. Ptvoiced understanding. Reviewed health maintenance. Instructed to continue currentmedications, diet and exercise. Patient agreed with treatment plan. Follow up asdirected.      Electronically signed by Chava Mcdaniel MD on 5/11/2021 at 11:26 AM

## 2021-05-11 NOTE — FLOWSHEET NOTE
Physical Therapy Daily Treatment Note    Date:  2021    Patient Name:  Kate Talbot    :  1945  MRN: [id number]  Restrictions/Precautions:     Medical/Treatment Diagnosis Information:   · Diagnosis: M19.011 arthritis R shld  · Treatment Diagnosis: R shld pain, impingement  Insurance/Certification information:  PT Insurance Information: [facility] Medicare  Physician Information:  Referring Practitioner: [address] [place] [address],  [address] of care signed (Y/N):  n  Visit# / total visits:   Pain level: 10       Time In:9:00   Time Out:9:42    Progress Note: [x]  Yes  []  No  Next due by: Visit #8, or 6/10/21      Subjective:   See eval    Objective: See eval  Observation:   Test measurements:      Exercises: there ex for ROM, strength, impingement reduction  Exercise/Equipment Resistance/Repetitions Other comments   Supine pect stretch 4' On 1/2 roll   Extension stretch 10x    Int rotation stretch 10x, 5\"         B rowing/extension     6-way t-band     Flex to 90 deg     Scaption up, down     Prone scapula program      External rotation sidely 10x    Abd in sidely               ROM 3' Int rot   [x] Provided verbal/tactile cueing for activities related to strengthening, flexibility, endurance, ROM. (32485)  [] Provided verbal/tactile cueing for activities related to improving balance, coordination, kinesthetic sense, posture, motor skill, proprioception. (54976)    Therapeutic Activities:     [] Therapeutic activities, direct (one-on-one) patient contact (use of dynamic activities to improve functional performance). (40858)    Gait:   [] Provided training and instruction to the patient for ambulation re-education. (36210)    Self-Care/ADL's  [] Self-care/home management training and compensatory training, meal preparation, safety procedures, and instructions in use of assistive technology devices/adaptive equipment, direct one-on-one contact.  (07003)    Home Exercise Program: Impingement reduction with supine pect stretch, extension & int rot stretch    [x] Reviewed/Progressed HEP activities related to strengthening, flexibility, endurance, ROM. (93219)  [] Reviewed/Progressed HEP activities related to improving balance, coordination, kinesthetic sense, posture, motor skill, proprioception.  (39195)    Manual Treatments:    [] Provided manual therapy to mobilize soft tissue/joints for the purpose of modulating pain, promoting relaxation,  increasing ROM, reducing/eliminating soft tissue swelling/inflammation/restriction, improving soft tissue extensibility. (17805)    Service Based Modalities:      Timed Code Treatment Minutes:   There ex/ HEP 10'     Total Treatment Minutes:   10'    Treatment/Activity Tolerance:  [x] Patient tolerated treatment well [] Patient limited by fatique  [] Patient limited by pain  [] Patient limited by other medical complications  [] Other:     Prognosis: [x] Good [] Fair  [] Poor    Patient Requires Follow-up: [x] Yes  [] No      Goals:  Short term goals  Time Frame for Short term goals: 1 week  Short term goal 1: Start HEP    Long term goals  Time Frame for Long term goals : 4 weeks  Long term goal 1: R shld pain controlled 1-2/10 to allow regular ADL  Long term goal 2: R shld 4+/5 with pain controlled  Long term goal 3: No loss of sleep due to R shld pain  Long term goal 4: SPADI 13/130 for 10% disability or less          Plan:   [] Continue per plan of care [] Alter current plan (see comments)  [x] Plan of care initiated [] Hold pending MD visit [] Discharge  Plan for Next Session:      Electronically signed by:  Сергей Floyd

## 2021-05-11 NOTE — PATIENT INSTRUCTIONS
Patient Education        Trochanteric Bursitis: Exercises  Introduction  Here are some examples of exercises for you to try. The exercises may be suggested for a condition or for rehabilitation. Start each exercise slowly. Ease off the exercises if you start to have pain. You will be told when to start these exercises and which ones will work best for you. How to do the exercises  Hamstring wall stretch   1. Lie on your back in a doorway, with your good leg through the open door. 2. Slide your affected leg up the wall to straighten your knee. You should feel a gentle stretch down the back of your leg. 3. Hold the stretch for at least 1 minute to begin. Then try to lengthen the time you hold the stretch to as long as 6 minutes. 4. Repeat 2 to 4 times. 5. If you do not have a place to do this exercise in a doorway, there is another way to do it:  6. Lie on your back, and bend the knee of your affected leg. 7. Loop a towel under the ball and toes of that foot, and hold the ends of the towel in your hands. 8. Straighten your knee, and slowly pull back on the towel. You should feel a gentle stretch down the back of your leg. 9. Hold the stretch for 15 to 30 seconds. Or even better, hold the stretch for 1 minute if you can. 10. Repeat 2 to 4 times. 1. Do not arch your back. 2. Do not bend either knee. 3. Keep one heel touching the floor and the other heel touching the wall. Do not point your toes. Straight-leg raises to the outside   1. Lie on your side, with your affected leg on top. 2. Tighten the front thigh muscles of your top leg to keep your knee straight. 3. Keep your hip and your leg straight in line with the rest of your body, and keep your knee pointing forward. Do not drop your hip back. 4. Lift your top leg straight up toward the ceiling, about 12 inches off the floor. Hold for about 6 seconds, then slowly lower your leg. 5. Repeat 8 to 12 times. Clamshell   1.  Lie on your side, with your affected leg on top and your head propped on a pillow. Keep your feet and knees together and your knees bent. 2. Raise your top knee, but keep your feet together. Do not let your hips roll back. Your legs should open up like a clamshell. 3. Hold for 6 seconds. 4. Slowly lower your knee back down. Rest for 10 seconds. 5. Repeat 8 to 12 times. Standing quadriceps stretch   1. If you are not steady on your feet, hold on to a chair, counter, or wall. You can also lie on your stomach or your side to do this exercise. 2. Bend the knee of the leg you want to stretch, and reach behind you to grab the front of your foot or ankle with the hand on the same side. For example, if you are stretching your right leg, use your right hand. 3. Keeping your knees next to each other, pull your foot toward your buttock until you feel a gentle stretch across the front of your hip and down the front of your thigh. Your knee should be pointed directly to the ground, and not out to the side. 4. Hold the stretch for 15 to 30 seconds. 5. Repeat 2 to 4 times. Piriformis stretch   1. Lie on your back with your legs straight. 2. Lift your affected leg and bend your knee. With your opposite hand, reach across your body, and then gently pull your knee toward your opposite shoulder. 3. Hold the stretch for 15 to 30 seconds. 4. Repeat 2 to 4 times. Double knee-to-chest   1. Lie on your back with your knees bent and your feet flat on the floor. You can put a small pillow under your head and neck if it is more comfortable. 2. Bring both knees to your chest.  3. Keep your lower back pressed to the floor. Hold for 15 to 30 seconds. 4. Relax, and lower your knees to the starting position. 5. Repeat 2 to 4 times. Follow-up care is a key part of your treatment and safety. Be sure to make and go to all appointments, and call your doctor if you are having problems.  It's also a good idea to know your test results and keep a list of the medicines you take. Where can you learn more? Go to https://chpepiceweb.[email or web address] org and sign in to your [employer] account. Enter A531 in the [facility] box to learn more about \"Trochanteric Bursitis: Exercises. \"     If you do not have an account, please click on the \"Sign Up Now\" link. Current as of: November 16, 2020               Content Version: 12.8  © 2006-2021 [employer]. Care instructions adapted under license by [employer]. If you have questions about a medical condition or this instruction, always ask your healthcare professional. [place] [address] any warranty or liability for your use of this information. Patient Education        Hip Bursitis: Exercises  Introduction  Here are some examples of exercises for you to try. The exercises may be suggested for a condition or for rehabilitation. Start each exercise slowly. Ease off the exercises if you start to have pain. You will be told when to start these exercises and which ones will work best for you. How to do the exercises  Hip rotator stretch   11. Lie on your back with both knees bent and your feet flat on the floor. 12. Put the ankle of your affected leg on your opposite thigh near your knee. 13. Use your hand to gently push your knee away from your body until you feel a gentle stretch around your hip. 14. Hold the stretch for 15 to 30 seconds. 15. Repeat 2 to 4 times. 16. Repeat steps 1 through 5, but this time use your hand to gently pull your knee toward your opposite shoulder. Iliotibial band stretch   4. Lean sideways against a wall. If you are not steady on your feet, hold on to a chair or counter. 5. Stand on the leg with the affected hip, with that leg close to the wall. Then cross your other leg in front of it. 6. Let your affected hip drop out to the side of your body and against wall. Then lean away from your affected hip until you feel a stretch.   7. Hold the stretch for 15 to 30 seconds. 8. Repeat 2 to 4 times. Straight-leg raises to the outside   6. Lie on your side, with your affected hip on top. 7. Tighten the front thigh muscles of your top leg to keep your knee straight. 8. Keep your hip and your leg straight in line with the rest of your body, and keep your knee pointing forward. Do not drop your hip back. 9. Lift your top leg straight up toward the ceiling, about 12 inches off the floor. Hold for about 6 seconds, then slowly lower your leg. 10. Repeat 8 to 12 times. Clamshell   6. Lie on your side, with your affected hip on top and your head propped on a pillow. Keep your feet and knees together and your knees bent. 7. Raise your top knee, but keep your feet together. Do not let your hips roll back. Your legs should open up like a clamshell. 8. Hold for 6 seconds. 9. Slowly lower your knee back down. Rest for 10 seconds. 10. Repeat 8 to 12 times. Follow-up care is a key part of your treatment and safety. Be sure to make and go to all appointments, and call your doctor if you are having problems. It's also a good idea to know your test results and keep a list of the medicines you take. Where can you learn more? Go to https://Groupspeak.[employer]. org and sign in to your [employer] account. Enter M043 in the [facility] box to learn more about \"Hip Bursitis: Exercises. \"     If you do not have an account, please click on the \"Sign Up Now\" link. Current as of: November 16, 2020               Content Version: 12.8  © 3500-5187 [employer]. Care instructions adapted under license by [facility] ([facility]). If you have questions about a medical condition or this instruction, always ask your healthcare professional. [address] any warranty or liability for your use of this information.

## 2021-05-11 NOTE — PLAN OF CARE
Uma Paz, PT      If you have any questions or concerns, please don't hesitate to call.   Thank you for your referral.      Physician Signature:________________________________Date:__________________  By signing above, therapists plan is approved by physician

## 2021-05-11 NOTE — PROGRESS NOTES
Physical Therapy  Initial Assessment  Date: 2021  Patient Name: Deny Bradshaw  MRN: [id number]  : 1945     Treatment Diagnosis: R shld pain, impingement    Restrictions- none       Subjective   General  Chart Reviewed: Yes  Patient assessed for rehabilitation services?: Yes  Response To Previous Treatment: Not applicable  Family / Caregiver Present: No  Referring Practitioner: Maribel  Referral Date : 21  Diagnosis: M19.011 arthritis R shld  Follows Commands: Within Functional Limits  PT Visit Information  PT Insurance Information: [facility] Medicare  Subjective  Subjective: R shld pain has been present off and on for a couple years. Has lower tolerance to activity. Is R dominant. Pain Screening  Patient Currently in Pain: Yes  Pain Assessment  Pain Assessment: 0-10  Pain Level: 6  Patient's Stated Pain Goal: 1  Pain Type: Chronic pain  Pain Location: Shoulder;Arm  Pain Orientation: Right  Pain Radiating Towards: R shld. Some pain to the thumb.   Pain Descriptors: Aching;Radiating  Pain Frequency: Intermittent  Pain Onset: Progressive  Clinical Progression: Gradually worsening  Functional Pain Assessment: Prevents or interferes with many active not passive activities  Vital Signs  Patient Currently in Pain: Yes    Vision/Hearing  Vision  Vision: Within Functional Limits  Hearing  Hearing: Within functional limits    Orientation  Orientation  Overall Orientation Status: Within Normal Limits    Social/Functional History  Social/Functional History  Lives With: Alone  Type of Home: House  Home Layout: One level  Home Access: Stairs to enter without rails  Entrance Stairs - Number of Steps: 1  ADL Assistance: Independent  Homemaking Assistance: Independent  Ambulation Assistance: Independent  Transfer Assistance: Independent  Active : Yes  Mode of Transportation: Car  Occupation: Part time employment  Type of occupation: HH aide    Objective     Observation/Palpation  Posture: Good    PROM RUE (degrees)  R Shoulder Flex  0-180: 170 +pain  R Shoulder Ext  0-45: 20 +pain  R Shoulder ABduction 0-180: 170  R Shoulder Int Rotation  0-70: 45 +pain  R Shoulder Ext Rotation  0-90: 90  AROM RUE (degrees)  R Shoulder Flexion 0-180: 170  R Shoulder Extension 0-45: 20  R Shoulder ABduction 0-180: 160  R Shoulder Int Rotation  0-70: L3  R Shoulder Ext Rotation 0-90: T2    Strength RUE  Comment: +pain abd, flexion, scaption up/down  R Shoulder Flexion: 4-/5  R Shoulder Extension: 5/5  R Shoulder ABduction: 4-/5  R Shoulder Internal Rotation: 5/5  R Shoulder External Rotation: 4+/5     Additional Measures  Other: + Neers, horiz add impingement. + painful arc at mid-range abd        Assessment   Conditions Requiring Skilled Therapeutic Intervention  Body structures, Functions, Activity limitations: Decreased ROM; Decreased strength; Increased pain  Treatment Diagnosis: R shld pain, impingement  Prognosis: Good  Decision Making: Low Complexity  REQUIRES PT FOLLOW UP: Yes  Activity Tolerance  Activity Tolerance: Patient Tolerated treatment well         Plan   Plan  Times per week: 2  Plan weeks: 4  Current Treatment Recommendations: Strengthening, ROM, Home Exercise Program, Manual Therapy - Joint Manipulation, Modalities, Integrated Dry Needling    OutComes Score   SPADI 26/130 = 20% disability            Goals  Short term goals  Time Frame for Short term goals: 1 week  Short term goal 1: Start HEP  Long term goals  Time Frame for Long term goals : 4 weeks  Long term goal 1: R shld pain controlled 1-2/10 to allow regular ADL  Long term goal 2: R shld 4+/5 with pain controlled  Long term goal 3: No loss of sleep due to R shld pain  Long term goal 4: SPADI 13/130 for 10% disability or less       Therapy Time   Individual Concurrent Group Co-treatment   Time In 0900         Time Out 0942         Minutes 42                 Janet Adam, PT

## 2021-05-13 ENCOUNTER — HOSPITAL ENCOUNTER (OUTPATIENT)
Dept: PHYSICAL THERAPY | Age: 76
Setting detail: THERAPIES SERIES
Discharge: HOME | End: 2021-05-13
Payer: MEDICARE

## 2021-05-13 PROCEDURE — 97140 MANUAL THERAPY 1/> REGIONS: CPT

## 2021-05-13 PROCEDURE — 97110 THERAPEUTIC EXERCISES: CPT

## 2021-05-13 PROCEDURE — G0283 ELEC STIM OTHER THAN WOUND: HCPCS

## 2021-05-13 NOTE — FLOWSHEET NOTE
Physical Therapy Daily Treatment Note    Date:  2021    Patient Name:  Thelma Nelson    :  1945  MRN: [id number]  Restrictions/Precautions:     Medical/Treatment Diagnosis Information:   · Diagnosis: M19.011 arthritis R shld  · Treatment Diagnosis: R shld pain, impingement  Insurance/Certification information:  PT Insurance Information: [facility] Medicare  Physician Information:  Referring Practitioner: [address] [place] [address],  [address] of care signed (Y/N):  n  Visit# / total visits:   Pain level: 0/10       Time In:9:00   Time Out:9:42    Progress Note: [x]  Yes  []  No  Next due by: Visit #8, or 6/10/21      Subjective: The patinet was reporting no pain in the right shoulder today. She reported no increased soreness after the initial visit. Objective:   Observation: moderate rounded shoulder posture and forward head. Spent time on posture education and correction to improve alignment with the right shoulder. Abdominal bracing completed to initiate more abdominal bracing with activities. Supine inferior and posterior GH glides grade2 and 3 to improve mobility of the right shoulder. Standing postural there-ex completed and supine postural ther-ex completed. Test measurements:      Exercises: there ex for ROM, strength, impingement reduction  Exercise/Equipment Resistance/Repetitions Other comments   Supine pect stretch 4' On 1/2 roll   Extension stretch 10x    Int rotation stretch 10x, 5\"    Supine chin tuck and retraction 10 each    B rowing/extension 10x each  Red t-band   6-way t-band     Flex to 90 deg 10x    Scaption up, down 10x    Prone scapula program      External rotation sidely 10x    Abd in sidely 10x              ROM 3' Int rot   [x] Provided verbal/tactile cueing for activities related to strengthening, flexibility, endurance, ROM. (12218)  [] Provided verbal/tactile cueing for activities related to improving balance, coordination, kinesthetic sense, posture, motor skill, proprioception. (22464)    Therapeutic Activities:     [] Therapeutic activities, direct (one-on-one) patient contact (use of dynamic activities to improve functional performance). (23260)    Gait:   [] Provided training and instruction to the patient for ambulation re-education. (55916)    Self-Care/ADL's  [] Self-care/home management training and compensatory training, meal preparation, safety procedures, and instructions in use of assistive technology devices/adaptive equipment, direct one-on-one contact. (46059)    Home Exercise Program: Impingement reduction with supine pect stretch, extension & int rot stretch    [x] Reviewed/Progressed HEP activities related to strengthening, flexibility, endurance, ROM. (61663)  [] Reviewed/Progressed HEP activities related to improving balance, coordination, kinesthetic sense, posture, motor skill, proprioception.  (14753)    Manual Treatments:    [x] Provided manual therapy to mobilize soft tissue/joints for the purpose of modulating pain, promoting relaxation,  increasing ROM, reducing/eliminating soft tissue swelling/inflammation/restriction, improving soft tissue extensibility. (19246)    Service Based Modalities: IFC 15 min with intensity to patient tolerance, no skin irritation noted after the treatment    Timed Code Treatment Minutes:   There ex 15 min, manualtherapy 15 min    Total Treatment Minutes:   46    Treatment/Activity Tolerance:  [x] Patient tolerated treatment well [] Patient limited by fatique  [] Patient limited by pain  [] Patient limited by other medical complications  [] Other:     Prognosis: [x] Good [] Fair  [] Poor    Patient Requires Follow-up: [x] Yes  [] No      Goals:  Short term goals  Time Frame for Short term goals: 1 week  Short term goal 1: Start HEP    Long term goals  Time Frame for Long term goals : 4 weeks  Long term goal 1: R shld pain controlled 1-2/10 to allow regular ADL  Long term goal 2: R shld 4+/5 with pain controlled  Long term goal 3: No loss of sleep due to R shld pain  Long term goal 4: SPADI 13/130 for 10% disability or less          Plan:   [x] Continue per plan of care [] Alter current plan (see comments)  [] Plan of care initiated [] Hold pending MD visit [] Discharge  Plan for Next Session:      Electronically signed by:  Billy Liu

## 2021-05-18 ENCOUNTER — HOSPITAL ENCOUNTER (OUTPATIENT)
Dept: PHYSICAL THERAPY | Age: 76
Setting detail: THERAPIES SERIES
Discharge: HOME | End: 2021-05-18
Payer: MEDICARE

## 2021-05-18 PROCEDURE — G0283 ELEC STIM OTHER THAN WOUND: HCPCS

## 2021-05-18 PROCEDURE — 97110 THERAPEUTIC EXERCISES: CPT

## 2021-05-18 NOTE — FLOWSHEET NOTE
Physical Therapy Daily Treatment Note    Date:  2021    Patient Name:  Vladimir Shore    :  1945  MRN: [id number]  Restrictions/Precautions:     Medical/Treatment Diagnosis Information:   · Diagnosis: M19.011 arthritis R shld  · Treatment Diagnosis: R shld pain, impingement  Insurance/Certification information:  PT Insurance Information: [place] [facility]  Physician Information:  Referring Practitioner: [address] [place] [address],  [address] of care signed (Y/N):  n   Visit# / total visits:   Pain level: 10       Time In: 9:30   Time Out: 10:04    Progress Note: [x]  Yes  []  No  Next due by: Visit #8, or 6/10/21      Subjective:  Patient was 15 minutes late to session and therapist was taking phone call with doctor after placing no show therefore treatment session limited. Patient experiencing no pain on this date. She is seeing improvement with therapy. Objective: ANGIE to increase ROM strength and reduction of impingement for improved ease with functional daily tasks. Verbal instructions provided throughout for proper performance.    Observation:   Test measurements:      Exercises: there ex for ROM, strength, impingement reduction  Exercise/Equipment Resistance/Repetitions Other comments   Supine pect stretch 4' On 1/2 roll   Extension stretch 10x    Int rotation stretch 10x, 5\"    Supine chin tuck and retraction 10 each    B rowing/extension 10x each  Red t-band   6-way t-band     Flex to 90 deg 10x    Scaption up, down 10x    Prone scapula program      External rotation sidely 10x    Abd in sidely 10x              ROM 3' Int rot   [x] Provided verbal/tactile cueing for activities related to strengthening, flexibility, endurance, ROM. (80343)  [] Provided verbal/tactile cueing for activities related to improving balance, coordination, kinesthetic sense, posture, motor skill, proprioception. (38886)    Therapeutic Activities:     [] Therapeutic activities, direct (one-on-one) patient contact (use of dynamic activities to improve functional performance). (07837)    Gait:   [] Provided training and instruction to the patient for ambulation re-education. (54617)    Self-Care/ADL's  [] Self-care/home management training and compensatory training, meal preparation, safety procedures, and instructions in use of assistive technology devices/adaptive equipment, direct one-on-one contact. (09617)    Home Exercise Program: Impingement reduction with supine pect stretch, extension & int rot stretch    [x] Reviewed/Progressed HEP activities related to strengthening, flexibility, endurance, ROM. (47520)  [] Reviewed/Progressed HEP activities related to improving balance, coordination, kinesthetic sense, posture, motor skill, proprioception.  (36020)    Manual Treatments:    [x] Provided manual therapy to mobilize soft tissue/joints for the purpose of modulating pain, promoting relaxation,  increasing ROM, reducing/eliminating soft tissue swelling/inflammation/restriction, improving soft tissue extensibility. (83497)    Service Based Modalities: IFC 15 min with intensity to patient tolerance, no skin irritation noted after the treatment    Timed Code Treatment Minutes:   There ex 19'    Total Treatment Minutes:  29'     Treatment/Activity Tolerance:  [x] Patient tolerated treatment well [] Patient limited by fatique  [] Patient limited by pain  [] Patient limited by other medical complications  [] Other:     Prognosis: [x] Good [] Fair  [] Poor    Patient Requires Follow-up: [x] Yes  [] No      Goals:  Short term goals  Time Frame for Short term goals: 1 week  Short term goal 1: Start HEP    Long term goals  Time Frame for Long term goals : 4 weeks  Long term goal 1: R shld pain controlled 1-2/10 to allow regular ADL  Long term goal 2: R shld 4+/5 with pain controlled  Long term goal 3: No loss of sleep due to R shld pain  Long term goal 4: SPADI 13/130 for 10% disability or less          Plan:   [x] Continue per plan of care [] Alter current plan

## 2021-05-20 ENCOUNTER — HOSPITAL ENCOUNTER (OUTPATIENT)
Dept: PHYSICAL THERAPY | Age: 76
Setting detail: THERAPIES SERIES
Discharge: HOME | End: 2021-05-20
Payer: MEDICARE

## 2021-05-20 NOTE — PROGRESS NOTES
Physical Therapy  Outpatient Physical Therapy    [] [place]  Phone: [phone]  Fax: [phone]      [] [place]  Phone: [phone]  Fax: [phone]    Physical Therapy  Cancellation/No-show Note  Patient Name:  Diana Brown  :  1945   Date:  2021  Cancelled visits to date: 0  No-shows to date: 1    For today's appointment patient:  []  Cancelled  []  Rescheduled appointment  [x]  No-show     Reason given by patient:  []  Patient ill  []  Conflicting appointment  []  No transportation    []  Conflict with work  [x]  No reason given  []  Other:     Comments:      Electronically signed by: Madeline Yanez PTA

## 2021-05-26 NOTE — PROGRESS NOTES
I have reviewed and agree to the content of the note written by the PTA.   Electronically signed by Shannan Marion PT [id number]

## 2021-06-21 NOTE — DISCHARGE SUMMARY
[facility]    [x] [place]  Phone: [phone]  Fax: [phone]      [] [place]  Phone: [phone]  Fax: [phone]    Physical Therapy Discharge Note  Date: 2021        Patient Name:  Stephen Gallagher    :  1945  MRN: [id number]  Restrictions/Precautions:      Medical/Treatment Diagnosis Information:  ·   Diagnosis: M19.011 arthritis R shld  · Treatment Diagnosis: R shld pain, impingement  ·    Insurance/Certification information:    [facility] Medicare  Physician Information:     Maribel  Plan of care signed (Y/N):y  Visit# / total visits:  3  Pain level: 0/10       Time Period for Report:   Cancels/No-shows to date: 1     Plan of Care/Treatment to date:  [x] Therapeutic Exercise    [] Modalities:  [] Therapeutic Activity     [] Ultrasound  [] Electrical Stimulation  [] Gait Training      [] Cervical Traction    [] Lumbar Traction  [] Neuromuscular Re-education  [] Cold/hotpack [] Iontophoresis  [x] Instruction in HEP      Other:  [x] Manual Therapy       []    [] Aquatic Therapy       []                              Subjective:    Patient experiencing no pain on this date. She is seeing improvement with therapy         Objective:        Only 3 sessions            Plan:       d/c    Goals:    Short term goals  Time Frame for Short term goals: 1 week  Short term goal 1: Start HEP     Long term goals  Time Frame for Long term goals : 4 weeks  Long term goal 1: R shld pain controlled 1-2/10 to allow regular ADL  Long term goal 2: R shld 4+/5 with pain controlled  Long term goal 3: No loss of sleep due to R shld pain  Long term goal 4: SPADI 13/130 for 10% disability or less         Percentage of Goals Met: unknown            Discharge Prognosis: [] Excellent [x] Good [] Fair  [] Poor     Goal Status:  [] Achieved [x] Partially Achieved  [] Not Achieved       Electronically signed by:  Froy Barnes, PT

## 2021-10-20 DIAGNOSIS — R73.09 ELEVATED GLUCOSE: ICD-10-CM

## 2021-10-20 NOTE — TELEPHONE ENCOUNTER
Haja Redd called requesting a refill of the below medication which has been pended for you:     Requested Prescriptions     Pending Prescriptions Disp Refills    blood glucose test strips (ACCU-CHEK IRVIN PLUS) strip [Pharmacy Med Name: [id number] 100S] 100 strip 1     Sig: TEST BLOOD SUGAR ONCE DAILY AND AS NEEDED FOR SYMPTOMS OF IRREGULAR BLOOD GLUCOSE       Last Appointment Date: 5/4/2021  Next Appointment Date: Visit date not found    Allergies   Allergen Reactions    Asa [Aspirin]      Nausea and Vomiting    Celexa [Citalopram Hydrobromide]      dizziness    Penicillins Nausea Only    Sulfa Antibiotics     Sulfur Hives    Wasp Venom Other (See Comments)     Swelling, rash, itching, peeling/dermatitis

## 2021-10-21 RX ORDER — BLOOD SUGAR DIAGNOSTIC
STRIP MISCELLANEOUS
Qty: 100 STRIP | Refills: 1 | Status: SHIPPED | OUTPATIENT
Start: 2021-10-21 | End: 2021-12-13 | Stop reason: SDUPTHER

## 2021-10-21 NOTE — TELEPHONE ENCOUNTER
Patient is aware that she needs an appointment. She is currently in [place] visiting with her daughter. She will call to schedule appointment when she is back home.

## 2021-11-02 DIAGNOSIS — R73.09 ELEVATED GLUCOSE: ICD-10-CM

## 2021-11-02 RX ORDER — LANCETS
EACH MISCELLANEOUS
Qty: 100 EACH | Refills: 0 | Status: SHIPPED | OUTPATIENT
Start: 2021-11-02 | End: 2021-12-13 | Stop reason: SDUPTHER

## 2021-12-01 DIAGNOSIS — Z12.31 ENCOUNTER FOR SCREENING MAMMOGRAM FOR BREAST CANCER: Primary | ICD-10-CM

## 2021-12-08 ENCOUNTER — NURSE ONLY (OUTPATIENT)
Dept: LAB | Age: 76
End: 2021-12-08
Payer: MEDICARE

## 2021-12-08 DIAGNOSIS — Z11.1 VISIT FOR MANTOUX TEST: Primary | ICD-10-CM

## 2021-12-08 PROCEDURE — 86580 TB INTRADERMAL TEST: CPT

## 2021-12-08 PROCEDURE — PBSHW MANTOUX TESTING: Performed by: FAMILY MEDICINE

## 2021-12-10 LAB
INDURATION: 0
TB SKIN TEST: NEGATIVE

## 2021-12-13 ENCOUNTER — HOSPITAL ENCOUNTER (OUTPATIENT)
Dept: LAB | Age: 76
Discharge: HOME | End: 2021-12-13
Payer: MEDICARE

## 2021-12-13 ENCOUNTER — OFFICE VISIT (OUTPATIENT)
Dept: FAMILY MEDICINE CLINIC | Age: 76
End: 2021-12-13
Payer: MEDICARE

## 2021-12-13 VITALS
SYSTOLIC BLOOD PRESSURE: 124 MMHG | WEIGHT: 167.2 LBS | HEIGHT: 63 IN | OXYGEN SATURATION: 100 % | HEART RATE: 75 BPM | TEMPERATURE: 97.9 F | DIASTOLIC BLOOD PRESSURE: 78 MMHG | BODY MASS INDEX: 29.62 KG/M2 | RESPIRATION RATE: 16 BRPM

## 2021-12-13 DIAGNOSIS — L65.9 HAIR LOSS: Primary | ICD-10-CM

## 2021-12-13 DIAGNOSIS — L65.9 HAIR LOSS: ICD-10-CM

## 2021-12-13 DIAGNOSIS — R73.09 ELEVATED GLUCOSE: ICD-10-CM

## 2021-12-13 DIAGNOSIS — L70.0 ACNE VULGARIS: ICD-10-CM

## 2021-12-13 DIAGNOSIS — F90.9 ATTENTION DEFICIT HYPERACTIVITY DISORDER (ADHD), UNSPECIFIED ADHD TYPE: ICD-10-CM

## 2021-12-13 DIAGNOSIS — R73.9 HYPERGLYCEMIA: ICD-10-CM

## 2021-12-13 DIAGNOSIS — E66.3 OVERWEIGHT: ICD-10-CM

## 2021-12-13 LAB
ALBUMIN SERPL-MCNC: 4.4 G/DL (ref 3.5–5.2)
ALBUMIN/GLOBULIN RATIO: 1.4 (ref 1–2.5)
ALP BLD-CCNC: 82 U/L (ref 35–104)
ALT SERPL-CCNC: 27 U/L (ref 5–33)
ANION GAP SERPL CALCULATED.3IONS-SCNC: 13 MMOL/L (ref 9–17)
AST SERPL-CCNC: 50 U/L
BILIRUB SERPL-MCNC: 0.45 MG/DL (ref 0.3–1.2)
BUN BLDV-MCNC: 14 MG/DL (ref 8–23)
BUN/CREAT BLD: 18 (ref 9–20)
CALCIUM SERPL-MCNC: 9.7 MG/DL (ref 8.6–10.4)
CHLORIDE BLD-SCNC: 100 MMOL/L (ref 98–107)
CHOLESTEROL/HDL RATIO: 1.9
CHOLESTEROL: 115 MG/DL
CO2: 27 MMOL/L (ref 20–31)
CREAT SERPL-MCNC: 0.77 MG/DL (ref 0.5–0.9)
GFR AFRICAN AMERICAN: >60 ML/MIN
GFR NON-AFRICAN AMERICAN: >60 ML/MIN
GFR SERPL CREATININE-BSD FRML MDRD: ABNORMAL ML/MIN/{1.73_M2}
GFR SERPL CREATININE-BSD FRML MDRD: ABNORMAL ML/MIN/{1.73_M2}
GLUCOSE BLD-MCNC: 89 MG/DL (ref 70–99)
HDLC SERPL-MCNC: 61 MG/DL
LDL CHOLESTEROL: 47 MG/DL (ref 0–130)
POTASSIUM SERPL-SCNC: 4.3 MMOL/L (ref 3.7–5.3)
SODIUM BLD-SCNC: 140 MMOL/L (ref 135–144)
THYROXINE, FREE: 1.5 NG/DL (ref 0.93–1.7)
TOTAL PROTEIN: 7.6 G/DL (ref 6.4–8.3)
TRIGL SERPL-MCNC: 33 MG/DL
TSH SERPL DL<=0.05 MIU/L-ACNC: 2.11 MIU/L (ref 0.3–5)
VLDLC SERPL CALC-MCNC: NORMAL MG/DL (ref 1–30)

## 2021-12-13 PROCEDURE — 80053 COMPREHEN METABOLIC PANEL: CPT

## 2021-12-13 PROCEDURE — 36415 COLL VENOUS BLD VENIPUNCTURE: CPT

## 2021-12-13 PROCEDURE — G8400 PT W/DXA NO RESULTS DOC: HCPCS | Performed by: NURSE PRACTITIONER

## 2021-12-13 PROCEDURE — 83036 HEMOGLOBIN GLYCOSYLATED A1C: CPT

## 2021-12-13 PROCEDURE — G8427 DOCREV CUR MEDS BY ELIG CLIN: HCPCS | Performed by: NURSE PRACTITIONER

## 2021-12-13 PROCEDURE — 3017F COLORECTAL CA SCREEN DOC REV: CPT | Performed by: NURSE PRACTITIONER

## 2021-12-13 PROCEDURE — 99212 OFFICE O/P EST SF 10 MIN: CPT

## 2021-12-13 PROCEDURE — 1123F ACP DISCUSS/DSCN MKR DOCD: CPT | Performed by: NURSE PRACTITIONER

## 2021-12-13 PROCEDURE — G8417 CALC BMI ABV UP PARAM F/U: HCPCS | Performed by: NURSE PRACTITIONER

## 2021-12-13 PROCEDURE — 1090F PRES/ABSN URINE INCON ASSESS: CPT | Performed by: NURSE PRACTITIONER

## 2021-12-13 PROCEDURE — 99213 OFFICE O/P EST LOW 20 MIN: CPT | Performed by: NURSE PRACTITIONER

## 2021-12-13 PROCEDURE — 84439 ASSAY OF FREE THYROXINE: CPT

## 2021-12-13 PROCEDURE — 4040F PNEUMOC VAC/ADMIN/RCVD: CPT | Performed by: NURSE PRACTITIONER

## 2021-12-13 PROCEDURE — G8484 FLU IMMUNIZE NO ADMIN: HCPCS | Performed by: NURSE PRACTITIONER

## 2021-12-13 PROCEDURE — 84443 ASSAY THYROID STIM HORMONE: CPT

## 2021-12-13 PROCEDURE — 80061 LIPID PANEL: CPT

## 2021-12-13 PROCEDURE — 1036F TOBACCO NON-USER: CPT | Performed by: NURSE PRACTITIONER

## 2021-12-13 RX ORDER — OXYCODONE HYDROCHLORIDE 5 MG/1
5 TABLET ORAL EVERY 8 HOURS PRN
COMMUNITY
Start: 2021-06-23 | End: 2021-12-13

## 2021-12-13 RX ORDER — LANCETS
EACH MISCELLANEOUS
Qty: 100 EACH | Refills: 2 | Status: SHIPPED | OUTPATIENT
Start: 2021-12-13

## 2021-12-13 RX ORDER — DEXTROAMPHETAMINE SACCHARATE, AMPHETAMINE ASPARTATE MONOHYDRATE, DEXTROAMPHETAMINE SULFATE AND AMPHETAMINE SULFATE 2.5; 2.5; 2.5; 2.5 MG/1; MG/1; MG/1; MG/1
10 CAPSULE, EXTENDED RELEASE ORAL DAILY
Qty: 30 CAPSULE | Refills: 0 | Status: SHIPPED | OUTPATIENT
Start: 2021-12-13 | End: 2022-08-09

## 2021-12-13 RX ORDER — TRETINOIN 1 MG/G
CREAM TOPICAL
Qty: 45 G | Refills: 0 | Status: SHIPPED | OUTPATIENT
Start: 2021-12-13 | End: 2022-08-09 | Stop reason: SDUPTHER

## 2021-12-13 RX ORDER — BLOOD-GLUCOSE METER
EACH MISCELLANEOUS
Qty: 1 KIT | Refills: 0 | Status: SHIPPED | OUTPATIENT
Start: 2021-12-13 | End: 2022-08-10

## 2021-12-13 RX ORDER — POLYETHYLENE GLYCOL 3350 17 G/17G
17 POWDER, FOR SOLUTION ORAL DAILY
COMMUNITY
Start: 2021-06-23 | End: 2021-12-13

## 2021-12-13 RX ORDER — BLOOD SUGAR DIAGNOSTIC
STRIP MISCELLANEOUS
Qty: 100 STRIP | Refills: 1 | Status: SHIPPED | OUTPATIENT
Start: 2021-12-13 | End: 2022-04-12

## 2021-12-13 ASSESSMENT — ENCOUNTER SYMPTOMS
DIARRHEA: 0
VOMITING: 0
CONSTIPATION: 0
NAUSEA: 0

## 2021-12-13 NOTE — PROGRESS NOTES
Subjective:      Patient ID: Sky Del Toro is a 78 y.o. female coming in for   Chief Complaint   Patient presents with    Annual Exam     RR patient. annual exam.    [address]     would like labs checked. states that she is having some hair loss. Presents to office in need of medications refills. Pt also reports she has had increasing hair loss over past few months. Pt did have thyroid labs checked in April 2021, which were normal. Denies any associated hypo/hyperthyroid symptoms. Review of Systems   Constitutional: Negative for chills and fever. Gastrointestinal: Negative for constipation, diarrhea, nausea and vomiting. Endocrine: Negative for cold intolerance and heat intolerance. All other systems reviewed and are negative. Objective:  /78   Pulse 75   Temp 97.9 °F (36.6 °C)   Resp 16   Ht 5' 3\" (1.6 m)   Wt 167 lb 3.2 oz (75.8 kg)   SpO2 100%   BMI 29.62 kg/m²      Physical Exam  Vitals and nursing note reviewed. Constitutional:       General: She is not in acute distress. Appearance: Normal appearance. She is not ill-appearing. HENT:      Head: Normocephalic. Cardiovascular:      Rate and Rhythm: Normal rate and regular rhythm. Heart sounds: Normal heart sounds. Pulmonary:      Effort: Pulmonary effort is normal.      Breath sounds: Normal breath sounds. Musculoskeletal:      Cervical back: Neck supple. Right lower leg: No edema. Left lower leg: No edema. Skin:     General: Skin is warm and dry. Findings: No rash. Neurological:      General: No focal deficit present. Mental Status: She is alert and oriented to person, place, and time. Assessment:      1. Hair loss    2. Acne vulgaris    3. Elevated glucose    4. Hyperglycemia    5. Attention deficit hyperactivity disorder (ADHD), unspecified ADHD type           Plan:    medications refilled. Added repeat thyroid labs onto regularly scheduled labs.  Pt to do phone visit for medicare wellness visiti in next two weeks. Orders Placed This Encounter   Procedures    TSH     Standing Status:   Future     Number of Occurrences:   1     Standing Expiration Date:   12/13/2022    T4, Free     Standing Status:   Future     Number of Occurrences:   1     Standing Expiration Date:   12/13/2022      Outpatient Encounter Medications as of 12/13/2021   Medication Sig Dispense Refill    tretinoin (RETIN-A) 0.1 % cream Apply topically nightly. 45 g 0    blood glucose test strips (ACCU-CHEK IRVIN PLUS) strip TEST BLOOD SUGAR ONCE DAILY AND AS NEEDED FOR SYMPTOMS OF IRREGULAR BLOOD GLUCOSE 100 strip 1    Accu-Chek Softclix Lancets MISC Use to check blood glucose daily or as needed for hypo/hyperglycemia 100 each 2    Blood Glucose Monitoring Suppl (ACCU-CHEK IRVIN PLUS) w/Device KIT USE TO TEST BLOOD SUGAR DAILY AND AS NEEDED FOR SYMPTOMS OF IRREGULAR BLOOD SUGAR 1 kit 0    amphetamine-dextroamphetamine (ADDERALL XR) 10 MG extended release capsule Take 1 capsule by mouth daily for 30 days. 30 capsule 0    Cholecalciferol (VITAMIN D3) 5000 UNITS TABS Take 5,000 Units by mouth daily      co-enzyme Q-10 50 MG capsule Take 50 mg by mouth daily      magnesium oxide (MAG-OX) 400 MG tablet Take 500 mg by mouth daily       Omega-3 300 MG CAPS Take 300 mg by mouth daily      [DISCONTINUED] oxyCODONE (ROXICODONE) 5 MG immediate release tablet Take 5 mg by mouth every 8 hours as needed.  (Patient not taking: Reported on 12/13/2021)      [DISCONTINUED] polyethylene glycol (GLYCOLAX) 17 GM/SCOOP powder Take 17 g by mouth daily (Patient not taking: Reported on 12/13/2021)      [DISCONTINUED] Accu-Chek Softclix Lancets MISC USE TO TEST BLOOD SUGAR DAILY AND AS NEEDED FOR SYMPTOMS OF IRREGULAR BLOOD SUGAR 100 each 0    [DISCONTINUED] blood glucose test strips (ACCU-CHEK IRVIN PLUS) strip TEST BLOOD SUGAR ONCE DAILY AND AS NEEDED FOR SYMPTOMS OF IRREGULAR BLOOD GLUCOSE 100 strip 1    [DISCONTINUED] amphetamine-dextroamphetamine (ADDERALL XR) 10 MG extended release capsule Take 1 capsule by mouth daily for 30 days. 30 capsule 0    [DISCONTINUED] tretinoin (RETIN-A) 0.1 % cream Apply topically nightly. 1 Tube 3    [DISCONTINUED] Blood Glucose Monitoring Suppl (ACCU-CHEK IRVIN PLUS) w/Device KIT USE TO TEST BLOOD SUGAR DAILY AND AS NEEDED FOR SYMPTOMS OF IRREGULAR BLOOD SUGAR 1 kit 0     No facility-administered encounter medications on file as of 12/13/2021.             Kit Cleaning, APRN - CNP

## 2021-12-14 LAB
ESTIMATED AVERAGE GLUCOSE: 111 MG/DL
HBA1C MFR BLD: 5.5 % (ref 4–6)

## 2021-12-16 ENCOUNTER — VIRTUAL VISIT (OUTPATIENT)
Dept: FAMILY MEDICINE CLINIC | Age: 76
End: 2021-12-16
Payer: MEDICARE

## 2021-12-16 DIAGNOSIS — Z00.00 ROUTINE GENERAL MEDICAL EXAMINATION AT A HEALTH CARE FACILITY: Primary | ICD-10-CM

## 2021-12-16 DIAGNOSIS — Z71.89 ADVANCED CARE PLANNING/COUNSELING DISCUSSION: ICD-10-CM

## 2021-12-16 PROCEDURE — 3017F COLORECTAL CA SCREEN DOC REV: CPT | Performed by: FAMILY MEDICINE

## 2021-12-16 PROCEDURE — 4040F PNEUMOC VAC/ADMIN/RCVD: CPT | Performed by: FAMILY MEDICINE

## 2021-12-16 PROCEDURE — G8484 FLU IMMUNIZE NO ADMIN: HCPCS | Performed by: FAMILY MEDICINE

## 2021-12-16 PROCEDURE — G0439 PPPS, SUBSEQ VISIT: HCPCS | Performed by: FAMILY MEDICINE

## 2021-12-16 PROCEDURE — 1123F ACP DISCUSS/DSCN MKR DOCD: CPT | Performed by: FAMILY MEDICINE

## 2021-12-16 SDOH — ECONOMIC STABILITY: FOOD INSECURITY: WITHIN THE PAST 12 MONTHS, THE FOOD YOU BOUGHT JUST DIDN'T LAST AND YOU DIDN'T HAVE MONEY TO GET MORE.: PATIENT DECLINED

## 2021-12-16 SDOH — ECONOMIC STABILITY: FOOD INSECURITY: WITHIN THE PAST 12 MONTHS, YOU WORRIED THAT YOUR FOOD WOULD RUN OUT BEFORE YOU GOT MONEY TO BUY MORE.: PATIENT DECLINED

## 2021-12-16 ASSESSMENT — PATIENT HEALTH QUESTIONNAIRE - PHQ9
SUM OF ALL RESPONSES TO PHQ QUESTIONS 1-9: 0
SUM OF ALL RESPONSES TO PHQ9 QUESTIONS 1 & 2: 0
1. LITTLE INTEREST OR PLEASURE IN DOING THINGS: 0
SUM OF ALL RESPONSES TO PHQ QUESTIONS 1-9: 0
2. FEELING DOWN, DEPRESSED OR HOPELESS: 0
SUM OF ALL RESPONSES TO PHQ QUESTIONS 1-9: 0

## 2021-12-16 ASSESSMENT — LIFESTYLE VARIABLES: HOW OFTEN DO YOU HAVE A DRINK CONTAINING ALCOHOL: 0

## 2021-12-16 ASSESSMENT — SOCIAL DETERMINANTS OF HEALTH (SDOH): HOW HARD IS IT FOR YOU TO PAY FOR THE VERY BASICS LIKE FOOD, HOUSING, MEDICAL CARE, AND HEATING?: PATIENT DECLINED

## 2021-12-16 NOTE — PROGRESS NOTES
mouth daily Yes Historical Provider, MD       Past Medical History:   Diagnosis Date    ADHD (attention deficit hyperactivity disorder)     Dysphagia     schatzki ring    Hyperglycemia     Vitamin D deficiency        Past Surgical History:   Procedure Laterality Date    BLADDER SUSPENSION  06/24/2020    Dr. Keli Sabillon, [facility] 81. COLONOSCOPY  08/12/2013    internal hemorrhoids,two sessile polyps ascending colon,few diverticula noted descending colon and sigmoid colon, per Dr Siobhan Vieira at [facility] 63 COLONOSCOPY N/A 5/26/2020    COLONOSCOPY DIAGNOSTIC performed by Leonardo Klein MD at [facility]    ENDOSCOPY, COLON, DIAGNOSTIC      HEMORRHOID SURGERY  09/25/2020    hemorrhoid banding, Dr. Dwayne Gao, [facility] INTRACAPSULAR CATARACT EXTRACTION Left 3/5/2020    Left Cataract Extraction w/ IOL Impant performed by Imer Meléndez MD at [place] [address] Right 8/18/2020    Right Cataract Extraction w/ IOL Implant performed by Ev Sharif MD at [facility] Left 2006   Travis Colvin OTHER SURGICAL HISTORY  2009    lipoma removed from upper arm    UPPER GASTROINTESTINAL ENDOSCOPY  08/12/2013    fragments of squamous mucosa with minimal,non-specific reactive epithelial changes,negative Hpylori,intestinal metaplasia or dysplasia,negative for active inflammatory changes per Dr Siobhan Vieira at [address] 5/26/2020    EGD ESOPHAGOGASTRODUODENOSCOPY DILATATION performed by Leonardo Klein MD at [facility] Left 5/26/2020    EGD BIOPSY performed by Leonardo Klein MD at [facility], biopsies showed chronic esophagitis, chronically inflamed gastric cardia-type mucosa, no intestinal metaplasia or dysplasia       Family History   Problem Relation Age of Onset    Cataracts Father     Diabetes Father     Alzheimer's Disease Mother    Travis Colvin Diabetes Brother     Diabetes Sister     Glaucoma Neg Hx        CareTeam (Including outside providers/suppliers regularly involved in providing care):   Patient Care Team:  Flaquita Caballero MD as PCP - General (Family Medicine)  Flaquita Caballero MD as PCP - Rafael Velázquez Provider    Wt Readings from Last 3 Encounters:   12/13/21 167 lb 3.2 oz (75.8 kg)   05/11/21 166 lb (75.3 kg)   05/04/21 158 lb (71.7 kg)     Patient-Reported Vitals 12/16/2021   Patient-Reported Weight 167lb   Patient-Reported Height 5 3      Based upon direct observation of the patient, evaluation of cognition reveals recent and remote memory intact. Patient's complete Health Risk Assessment and screening values have been reviewed and are found in Flowsheets. The following problems were reviewed today and where indicated follow up appointments were made and/or referrals ordered. Positive Risk Factor Screenings with Interventions:          General Health and ACP:  General  In general, how would you say your health is?: Very Good  In the past 7 days, have you experienced any of the following?  New or Increased Pain, New or Increased Fatigue, Loneliness, Social Isolation, Stress or Anger?: None of These  Do you get the social and emotional support that you need?: Yes  Do you have a Living Will?: (!) No  Advance Directives     Power of [address] [place] [address] Street Will ACP-Advance Directive ACP-Power of     Not on File Not on File Not on File Not on File      General Health Risk Interventions:  · No Living Will: Patient referred to [place] Habits/Nutrition:  Health Habits/Nutrition  Do you exercise for at least 20 minutes 2-3 times per week?: Yes  Have you lost any weight without trying in the past 3 months?: No  Do you eat only one meal per day?: No  Have you seen the dentist within the past year?: (!) No     Health Habits/Nutrition Interventions:  · Dental exam overdue:  patient encouraged to make appointment with his/her dentist    Hearing/Vision:  No exam data present  Hearing/Vision  Do you or your family notice any trouble with your hearing that hasn't been managed with hearing aids?: No  Do you have difficulty driving, watching TV, or doing any of your daily activities because of your eyesight?: No  Have you had an eye exam within the past year?: (!) No  Hearing/Vision Interventions:  · Vision concerns:  patient declines any further evaluation/treatment for this issue      Personalized Preventive Plan   Current Health Maintenance Status  Immunization History   Administered Date(s) Administered    COVID-19, Sanchez Ramos, PF, 30mcg/0.3mL 03/17/2021, 04/14/2021, 12/08/2021    Influenza Vaccine, unspecified formulation 10/02/2017    Influenza, High Dose (Fluzone 65 yrs and older) 11/28/2018, 11/01/2019    Influenza, Quadv, adjuvanted, 65 yrs +, IM, PF (Fluad) 10/05/2020    PPD Test 04/30/2021, 12/08/2021    Pneumococcal Conjugate 13-valent ([id number]) 01/29/2019    Pneumococcal Polysaccharide ([id number]) 07/12/2004, 09/18/2007, 08/22/2013    Td, unspecified formulation 05/04/2018    Tdap (Boostrix, Adacel) 09/03/2012, 11/28/2018    Zoster Recombinant (Shingrix) 03/01/2019, 05/15/2019        Health Maintenance   Topic Date Due    DEXA (modify frequency per FRAX score)  01/08/2021    Breast cancer screen  02/25/2021    Annual Wellness Visit (AWV)  07/31/2021    TSH testing  12/13/2022    Colon cancer screen colonoscopy  05/26/2025    Lipid screen  12/13/2026    DTaP/Tdap/Td vaccine (4 - Td or Tdap) 11/28/2028    Flu vaccine  Completed    Shingles Vaccine  Completed    Pneumococcal 65+ years Vaccine  Completed    COVID-19 Vaccine  Completed    Hepatitis C screen  Completed    Hepatitis A vaccine  Aged Out    Hepatitis B vaccine  Aged Out    Hib vaccine  Aged Out    Meningococcal (ACWY) vaccine  Aged Out     Recommendations for [employer] Due: see orders and patient instructions/AVS.  .   Recommended screening schedule for the next 5-10 years is provided to the patient in written form: see Patient Instructions/AVS.    Kely Skaggs, was evaluated through a synchronous (real-time) audio encounter. The patient (or guardian if applicable) is aware that this is a billable service. Verbal consent to proceed has been obtained within the past 12 months. The visit was conducted pursuant to the emergency declaration under the [address] [place] [address] and the [employer] General Act. Patient identification was verified, and a caregiver was present when appropriate. The patient was located in a state where the provider was credentialed to provide care. Total time spent for this encounter: Not billed by time    --Teo Garcia LPN on 63/61/1643 at 11:16 AM    An electronic signature was used to authenticate this note. Ronna Steele LPN, 21/06/8176, performed the documented evaluation under the direct supervision of the attending physician. This encounter was performed under Mimi kumari MDs, direct supervision, 12/16/2021.

## 2021-12-16 NOTE — PATIENT INSTRUCTIONS
Personalized Preventive Plan for [place] - 12/16/2021  Medicare offers a range of preventive health benefits. Some of the tests and screenings are paid in full while other may be subject to a deductible, co-insurance, and/or copay. Some of these benefits include a comprehensive review of your medical history including lifestyle, illnesses that may run in your family, and various assessments and screenings as appropriate. After reviewing your medical record and screening and assessments performed today your provider may have ordered immunizations, labs, imaging, and/or referrals for you. A list of these orders (if applicable) as well as your Preventive Care list are included within your After Visit Summary for your review. Other Preventive Recommendations:    · A preventive eye exam performed by an eye specialist is recommended every 1-2 years to screen for glaucoma; cataracts, macular degeneration, and other eye disorders. · A preventive dental visit is recommended every 6 months. · Try to get at least 150 minutes of exercise per week or 10,000 steps per day on a pedometer . · Order or download the FREE \"Exercise & Physical Activity: Your Everyday Guide\" from [employer]. Call 5-399.654.9589 or search [employer] online. · You need 4271-6864 mg of calcium and 3145-9968 IU of vitamin D per day. It is possible to meet your calcium requirement with diet alone, but a vitamin D supplement is usually necessary to meet this goal.  · When exposed to the sun, use a sunscreen that protects against both UVA and UVB radiation with an SPF of 30 or greater. Reapply every 2 to 3 hours or after sweating, drying off with a towel, or swimming. · Always wear a seat belt when traveling in a car. Always wear a helmet when riding a bicycle or motorcycle.

## 2021-12-28 ENCOUNTER — HOSPITAL ENCOUNTER (OUTPATIENT)
Dept: BONE DENSITY | Age: 76
Discharge: HOME | End: 2021-12-30
Payer: MEDICARE

## 2021-12-28 DIAGNOSIS — M85.852 OSTEOPENIA OF LEFT HIP: ICD-10-CM

## 2021-12-28 PROCEDURE — 77085 DXA BONE DENSITY AXL VRT FX: CPT

## 2022-01-11 DIAGNOSIS — R73.9 HYPERGLYCEMIA: ICD-10-CM

## 2022-01-11 DIAGNOSIS — R73.09 ELEVATED GLUCOSE: Primary | ICD-10-CM

## 2022-01-11 NOTE — TELEPHONE ENCOUNTER
Patient states the brand that she was previously prescribed for diabetic supplies (glucose meter & strips, etc) are no longer carried at [place] [address], so she is needing a new script for a new meter and strips. Please advise.

## 2022-01-11 NOTE — TELEPHONE ENCOUNTER
Nicole Porter called requesting a refill of the below medication which has been pended for you:     Requested Prescriptions     Pending Prescriptions Disp Refills    blood glucose monitor kit and supplies 1 kit 0     Sig: Dispense sufficient amount for indicated testing frequency plus additional to accommodate PRN testing needs. Dispense all needed supplies to include: monitor, strips, lancing device, lancets, control solutions, alcohol swabs.  Testing 1 x daily       Last Appointment Date: 12/16/2021  Next Appointment Date: 1/26/2022    Allergies   Allergen Reactions    Asa [Aspirin]      Nausea and Vomiting    Celexa [Citalopram Hydrobromide]      dizziness    Elemental Sulfur Hives    Penicillins Nausea Only    Sulfa Antibiotics     Wasp Venom Other (See Comments)     Swelling, rash, itching, peeling/dermatitis

## 2022-01-12 NOTE — TELEPHONE ENCOUNTER
Elisabeth Presley called requesting a refill of the below medication which has been pended for you:     Requested Prescriptions     Pending Prescriptions Disp Refills    blood glucose monitor kit and supplies 1 kit 0     Sig: Dispense sufficient amount for indicated testing frequency plus additional to accommodate PRN testing needs. Dispense all needed supplies to include: monitor, strips, lancing device, lancets, control solutions, alcohol swabs.  Testing 1 x daily       Last Appointment Date: 12/16/2021  Next Appointment Date: 1/26/2022    Allergies   Allergen Reactions    Asa [Aspirin]      Nausea and Vomiting    Celexa [Citalopram Hydrobromide]      dizziness    Elemental Sulfur Hives    Penicillins Nausea Only    Sulfa Antibiotics     Wasp Venom Other (See Comments)     Swelling, rash, itching, peeling/dermatitis

## 2022-01-20 ENCOUNTER — TELEPHONE (OUTPATIENT)
Dept: SPIRITUAL SERVICES | Age: 77
End: 2022-01-20

## 2022-01-20 NOTE — ACP (ADVANCE CARE PLANNING)
Advance Care Planning   Ambulatory ACP Specialist Patient Outreach    Date:  1/20/2022  ACP Specialist:  Theodore Zuniga    Outreach call to patient in follow-up to ACP Specialist referral from: Emery Del Rosario MD    [] PCP  [] Provider   [] Ambulatory Care Management [] Other for Reason:    [] Advance Directive Assistance  [] Code Status Discussion  [] Complete Portable DNR Order  [] Discuss Goals of Care  [] Complete POST/MOST  [x] Early ACP Decision-Making  [] Other    Date Referral Received: 12/16/21    Today's Outreach:  [] First   [x] Second  [] Third                               Third outreach made by []  phone  [] email []   [employer]     Intervention:  [] Spoke with Patient  [] Left VM requesting return call      Outcome:   Phoned to follow up. No answer. Mail box full. Next Step:   [] ACP scheduled conversation  [] Outreach again in one week               [] Email / Mail ACP Info Sheets  [] Email / Mail Advance Directive            [] Close Referral. Routing closure to referring provider/staff and to ACP Specialist .      Thank you for this referral.

## 2022-01-27 ENCOUNTER — TELEPHONE (OUTPATIENT)
Dept: FAMILY MEDICINE CLINIC | Age: 77
End: 2022-01-27

## 2022-02-25 ENCOUNTER — TELEPHONE (OUTPATIENT)
Dept: SPIRITUAL SERVICES | Age: 77
End: 2022-02-25

## 2022-02-25 NOTE — FLOWSHEET NOTE
Spoke with patient. Information needs to be resent. Mailed this date.  Call back 3/10/22.     02/25/22 0904   Encounter Summary   Services provided to: Patient   Referral/Consult From: Physician   Continue Visiting   (2/25/22)   Complexity of Encounter Low   Length of Encounter 15 minutes   Advance Care Planning Yes

## 2022-03-24 ENCOUNTER — TELEPHONE (OUTPATIENT)
Dept: SPIRITUAL SERVICES | Age: 77
End: 2022-03-24

## 2022-03-24 NOTE — ACP (ADVANCE CARE PLANNING)
Advance Care Planning   Ambulatory ACP Specialist Patient Outreach    Date:  3/24/2022  ACP Specialist:  Yumi Valdez    Outreach call to patient in follow-up to ACP Specialist referral from: Renetta Sarmiento MD    [] PCP  [] Provider   [] Ambulatory Care Management [] Other for Reason:    [] Advance Directive Assistance  [] Code Status Discussion  [] Complete Portable DNR Order  [] Discuss Goals of Care  [] Complete POST/MOST  [] Early ACP Decision-Making  [] Other    Date Referral Received:12/16/21    Today's Outreach:  [] First   [] Second  [x] Third                               Third outreach made by []  phone  [] email []   [employer]     Intervention:  [] Spoke with Patient  [x] Left VM requesting return call      Outcome:Phoned patient. left message. Next Step:   [] ACP scheduled conversation  [x] Outreach again in one week               [] Email / Mail ACP Info Sheets  [] Email / Mail Advance Directive            [] Close Referral. Routing closure to referring provider/staff and to ACP Specialist .      Thank you for this referral.

## 2022-03-28 DIAGNOSIS — R73.09 ELEVATED GLUCOSE: ICD-10-CM

## 2022-03-30 ENCOUNTER — CLINICAL DOCUMENTATION (OUTPATIENT)
Dept: SPIRITUAL SERVICES | Age: 77
End: 2022-03-30

## 2022-03-30 NOTE — ACP (ADVANCE CARE PLANNING)
Advance Care Planning    Ambulatory ACP Specialist Patient Outreach    Date:  3/30/2022  ACP Specialist:  YAZAN Gonzalez    Outreach call to patient in follow-up to ACP Specialist referral from: Zurdo Suresh MD    [x] PCP  [] Provider   [] Ambulatory Care Management [] Other for Reason:        [] Early ACP Decision-Making   [] Advance Directive Assistance   [x] Discuss Goals of Care   [] Code Status Discussion   [] Completion of Portable DNR order   [] Complete POST/MOST   [] Other (Specify)    Date Referral Received: 12/16/22    Today's Outreach:  [] First   [] Second  [x] [address] outreach made by []  phone  [] email []   [employer]     Intervention:  [] Spoke with Patient  [] Left VM requesting return call      Outcome: A final attempt to reach patient was made and staff unable to leave a message. Patient has not responded to other calls from ACP Specialist and has not replied to [employer]. Next Step:   [] ACP scheduled conversation  [] Outreach again in one week               [] Email / Mail ACP Info Sheets  [] Email / Mail Advance Directive            [x] Close Referral. Routing closure to referring provider/staff and to ACP Specialist .      Thank you for this referral. DISPLAY PLAN FREE TEXT

## 2022-04-12 RX ORDER — BLOOD SUGAR DIAGNOSTIC
STRIP MISCELLANEOUS
Qty: 100 STRIP | Refills: 1 | Status: SHIPPED | OUTPATIENT
Start: 2022-04-12 | End: 2022-08-10

## 2022-04-12 NOTE — TELEPHONE ENCOUNTER
Mihaela Benjamin called requesting a refill of the below medication which has been pended for you:     Requested Prescriptions     Pending Prescriptions Disp Refills    blood glucose test strips (ACCU-CHEK IRVIN PLUS) strip [Pharmacy Med Name: [facility] 100[address]] 100 strip 1     Sig: TEST BLOOD SUGAR ONCE DAILY AND AS NEEDED FOR SYMPTOMS OF IRREGULAR BLOOD SUGAR       Last Appointment Date: 12/16/2021  Next Appointment Date: Visit date not found (Patient is out of town and will call to schedule follow up when she returns)    Allergies   Allergen Reactions    Asa [Aspirin]      Nausea and Vomiting    Celexa [Citalopram Hydrobromide]      dizziness    Elemental Sulfur Hives    Penicillins Nausea Only    Sulfa Antibiotics     Wasp Venom Other (See Comments)     Swelling, rash, itching, peeling/dermatitis

## 2022-06-03 ENCOUNTER — HOSPITAL ENCOUNTER (OUTPATIENT)
Age: 77
Setting detail: SPECIMEN
Discharge: HOME | End: 2022-06-03
Payer: MEDICARE

## 2022-06-03 ENCOUNTER — OFFICE VISIT (OUTPATIENT)
Dept: PRIMARY CARE CLINIC | Age: 77
End: 2022-06-03
Payer: MEDICARE

## 2022-06-03 VITALS
OXYGEN SATURATION: 98 % | WEIGHT: 174 LBS | BODY MASS INDEX: 30.83 KG/M2 | TEMPERATURE: 97.4 F | HEIGHT: 63 IN | HEART RATE: 58 BPM | DIASTOLIC BLOOD PRESSURE: 88 MMHG | SYSTOLIC BLOOD PRESSURE: 130 MMHG

## 2022-06-03 DIAGNOSIS — R35.0 URINARY FREQUENCY: ICD-10-CM

## 2022-06-03 DIAGNOSIS — N30.00 ACUTE CYSTITIS WITHOUT HEMATURIA: Primary | ICD-10-CM

## 2022-06-03 LAB
-: ABNORMAL
BACTERIA: ABNORMAL
BILIRUBIN URINE: NEGATIVE
EPITHELIAL CELLS UA: ABNORMAL /HPF (ref 0–5)
GLUCOSE URINE: NEGATIVE
KETONES, URINE: NEGATIVE
LEUKOCYTE ESTERASE, URINE: ABNORMAL
NITRITE, URINE: NEGATIVE
PH UA: 6 (ref 5–6)
PROTEIN UA: NEGATIVE
RBC UA: ABNORMAL /HPF (ref 0–4)
SPECIFIC GRAVITY UA: 1.01 (ref 1.01–1.02)
URINE HGB: ABNORMAL
UROBILINOGEN, URINE: NORMAL
WBC UA: ABNORMAL /HPF (ref 0–4)

## 2022-06-03 PROCEDURE — 81001 URINALYSIS AUTO W/SCOPE: CPT

## 2022-06-03 PROCEDURE — G8417 CALC BMI ABV UP PARAM F/U: HCPCS | Performed by: NURSE PRACTITIONER

## 2022-06-03 PROCEDURE — G8400 PT W/DXA NO RESULTS DOC: HCPCS | Performed by: NURSE PRACTITIONER

## 2022-06-03 PROCEDURE — 99212 OFFICE O/P EST SF 10 MIN: CPT | Performed by: NURSE PRACTITIONER

## 2022-06-03 PROCEDURE — 3017F COLORECTAL CA SCREEN DOC REV: CPT | Performed by: NURSE PRACTITIONER

## 2022-06-03 PROCEDURE — G8427 DOCREV CUR MEDS BY ELIG CLIN: HCPCS | Performed by: NURSE PRACTITIONER

## 2022-06-03 PROCEDURE — 1036F TOBACCO NON-USER: CPT | Performed by: NURSE PRACTITIONER

## 2022-06-03 PROCEDURE — 1123F ACP DISCUSS/DSCN MKR DOCD: CPT | Performed by: NURSE PRACTITIONER

## 2022-06-03 PROCEDURE — 99213 OFFICE O/P EST LOW 20 MIN: CPT | Performed by: NURSE PRACTITIONER

## 2022-06-03 PROCEDURE — 1090F PRES/ABSN URINE INCON ASSESS: CPT | Performed by: NURSE PRACTITIONER

## 2022-06-03 RX ORDER — NITROFURANTOIN 25; 75 MG/1; MG/1
100 CAPSULE ORAL 2 TIMES DAILY
Qty: 14 CAPSULE | Refills: 0 | Status: SHIPPED | OUTPATIENT
Start: 2022-06-03 | End: 2022-06-10

## 2022-06-03 ASSESSMENT — PATIENT HEALTH QUESTIONNAIRE - PHQ9
SUM OF ALL RESPONSES TO PHQ QUESTIONS 1-9: 0
1. LITTLE INTEREST OR PLEASURE IN DOING THINGS: 0
SUM OF ALL RESPONSES TO PHQ QUESTIONS 1-9: 0
SUM OF ALL RESPONSES TO PHQ9 QUESTIONS 1 & 2: 0
SUM OF ALL RESPONSES TO PHQ QUESTIONS 1-9: 0
2. FEELING DOWN, DEPRESSED OR HOPELESS: 0
SUM OF ALL RESPONSES TO PHQ QUESTIONS 1-9: 0

## 2022-06-03 ASSESSMENT — ENCOUNTER SYMPTOMS
VOMITING: 0
NAUSEA: 0
RESPIRATORY NEGATIVE: 1

## 2022-06-03 NOTE — PROGRESS NOTES
[facility]             [address], [address]                        Telephone [phone]             Fax [phone]     Wing Zendejas  1945  K:[id number]   Date of visit:  6/3/2022    Subjective:    Wing Zendejas is a 78 y.o.  female who presents to [facility] today (6/3/2022) for evaluation of:    Chief Complaint   Patient presents with    Urinary Frequency     urgency,burning,blood clot. sx began this week        Urinary Frequency   This is a new problem. The current episode started in the past 7 days (X 3-4 days). The problem occurs every urination. The problem has been gradually worsening. The quality of the pain is described as burning. The pain is mild. There has been no fever. She is not sexually active. There is no history of pyelonephritis. Associated symptoms include frequency and hematuria (pink tinged urine with a tiny clot once last week). Pertinent negatives include no chills, discharge, flank pain, nausea, urgency or vomiting. She has tried nothing for the symptoms. The treatment provided no relief. She has the following problem list:  Patient Active Problem List   Diagnosis    Vitamin D deficiency    Overweight (BMI 25.0-29. 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 45 g 0    Cholecalciferol (VITAMIN D3) 5000 UNITS TABS Take 5,000 Units by mouth daily      co-enzyme Q-10 50 MG capsule Take 50 mg by mouth daily      magnesium oxide (MAG-OX) 400 MG tablet Take 500 mg by mouth daily       Omega-3 300 MG CAPS Take 300 mg by mouth daily      blood glucose test strips (ACCU-CHEK IRVIN PLUS) strip TEST BLOOD SUGAR ONCE DAILY AND AS NEEDED FOR SYMPTOMS OF IRREGULAR BLOOD SUGAR 100 strip 1    blood glucose monitor kit and supplies Dispense sufficient amount for indicated testing frequency plus additional to accommodate PRN testing needs. Dispense all needed supplies to include: monitor, strips, lancing device, lancets, control solutions, alcohol swabs. Testing 1 x daily 1 kit 0    Accu-Chek Softclix Lancets MISC Use to check blood glucose daily or as needed for hypo/hyperglycemia 100 each 2    Blood Glucose Monitoring Suppl (ACCU-CHEK IRVIN PLUS) w/Device KIT USE TO TEST BLOOD SUGAR DAILY AND AS NEEDED FOR SYMPTOMS OF IRREGULAR BLOOD SUGAR 1 kit 0    amphetamine-dextroamphetamine (ADDERALL XR) 10 MG extended release capsule Take 1 capsule by mouth daily for 30 days. 30 capsule 0     No current facility-administered medications for this visit. She is allergic to asa [aspirin], celexa [citalopram hydrobromide], elemental sulfur, penicillins, sulfa antibiotics, and wasp venom. .    She  reports that she has never smoked. She has never used smokeless tobacco.      Objective:    Vitals:    06/03/22 1731   BP: 130/88   Site: Left Upper Arm   Position: Sitting   Cuff Size: Medium Adult   Pulse: 58   Temp: 97.4 °F (36.3 °C)   TempSrc: Tympanic   SpO2: 98%   Weight: 174 lb (78.9 kg)   Height: 5' 2.5\" (1.588 m)     Body mass index is 31.32 kg/m². Review of Systems   Constitutional: Negative. Negative for chills. Respiratory: Negative. Cardiovascular: Negative.     Gastrointestinal: Negative for nausea and vomiting. Genitourinary: Positive for dysuria, frequency and hematuria (pink tinged urine with a tiny clot once last week). Negative for flank pain, urgency and vaginal discharge. Physical Exam  Vitals and nursing note reviewed. Constitutional:       Appearance: She is well-developed. HENT:      Head: Normocephalic. Eyes:      Pupils: Pupils are equal, round, and reactive to light. Cardiovascular:      Rate and Rhythm: Normal rate and regular rhythm. Heart sounds: Normal heart sounds. Pulmonary:      Effort: Pulmonary effort is normal.      Breath sounds: Normal breath sounds and air entry. Abdominal:      General: Abdomen is flat. Bowel sounds are normal.      Palpations: Abdomen is soft. Tenderness: There is no abdominal tenderness. There is no right CVA tenderness or left CVA tenderness. Musculoskeletal:      Cervical back: Normal range of motion and neck supple. Skin:     General: Skin is warm and dry. Neurological:      General: No focal deficit present. Mental Status: She is alert and oriented to person, place, and time. Psychiatric:         Behavior: Behavior normal.         Thought Content:  Thought content normal.       Assessment and Plan:    Hospital Outpatient Visit on 06/03/2022   Component Date Value Ref Range Status    Glucose, Ur 06/03/2022 NEGATIVE  NEGATIVE Final    Bilirubin Urine 06/03/2022 NEGATIVE  NEGATIVE Final    Ketones, Urine 06/03/2022 NEGATIVE  NEGATIVE Final    Specific Gravity, UA 06/03/2022 1.010  1.010 - 1.025 Final    Urine Hgb 06/03/2022 TRACE* NEGATIVE Final    pH, UA 06/03/2022 6.0  5.0 - 6.0 Final    Protein, UA 06/03/2022 NEGATIVE  NEGATIVE Final    Urobilinogen, Urine 06/03/2022 Normal  Normal Final    Nitrite, Urine 06/03/2022 NEGATIVE  NEGATIVE Final    Leukocyte Esterase, Urine 06/03/2022 1+* NEGATIVE Final    - 06/03/2022        Final    WBC, UA 06/03/2022 5 TO 10  0 - 4 /HPF Final    RBC, UA 06/03/2022 0 TO 4  0 - 4 /HPF Final    Epithelial Cells UA 06/03/2022 5 TO 10  0 - 5 /HPF Final    Bacteria, UA 06/03/2022 TRACE* None Final          Diagnosis Orders   1. Acute cystitis without hematuria  nitrofurantoin, macrocrystal-monohydrate, (MACROBID) 100 MG capsule   2. Urinary frequency  Urinalysis with Reflex to Culture     I reviewed labs with patient. Complete full course of antibiotic. Increase water intake. Call or return to clinic as needed if these symptoms worsen or fail to improve in the next 48 hours. The use, risks, benefits, and side effects of prescribed or recommended medications were discussed. All questions were answered and the patient/caregiver voiced understanding. No orders of the defined types were placed in this encounter.         Electronically signed by DREW Mishra CNP on 6/3/22 at 5:47 PM EDT

## 2022-06-03 NOTE — PATIENT INSTRUCTIONS
Patient Education        Urinary Tract Infection (UTI) in Women: Care Instructions  Overview     A urinary tract infection, or UTI, is a general term for an infection anywhere between the kidneys and the urethra (where urine comes out). Most UTIs arebladder infections. They often cause pain or burning when you urinate. UTIs are caused by bacteria and can be cured with antibiotics. Be sure tocomplete your treatment so that the infection does not get worse. Follow-up care is a key part of your treatment and safety. Be sure to make and go to all appointments, and call your doctor if you are having problems. It's also a good idea to know your test results and keep alist of the medicines you take. How can you care for yourself at home?  Take your antibiotics as directed. Do not stop taking them just because you feel better. You need to take the full course of antibiotics.  Drink extra water and other fluids for the next day or two. This will help make the urine less concentrated and help wash out the bacteria that are causing the infection. (If you have kidney, heart, or liver disease and have to limit fluids, talk with your doctor before you increase the amount of fluids you drink.)   Avoid drinks that are carbonated or have caffeine. They can irritate the bladder.  Urinate often. Try to empty your bladder each time.  To relieve pain, take a hot bath or lay a heating pad set on low over your lower belly or genital area. Never go to sleep with a heating pad in place. To prevent UTIs   Drink plenty of water each day. This helps you urinate often, which clears bacteria from your system. (If you have kidney, heart, or liver disease and have to limit fluids, talk with your doctor before you increase the amount of fluids you drink.)   Urinate when you need to.  If you are sexually active, urinate right after you have sex.  Change sanitary pads often.    Avoid douches, bubble baths, feminine hygiene sprays, and other feminine hygiene products that have deodorants.  After going to the bathroom, wipe from front to back. When should you call for help? Call your doctor now or seek immediate medical care if:     Symptoms such as fever, chills, nausea, or vomiting get worse or appear for the first time.      You have new pain in your back just below your rib cage. This is called flank pain.      There is new blood or pus in your urine.      You have any problems with your antibiotic medicine. Watch closely for changes in your health, and be sure to contact your doctor if:     You are not getting better after taking an antibiotic for 2 days.      Your symptoms go away but then come back. Where can you learn more? Go to [email or web address] and sign in to your [employer] account. Enter Z952 in the [place]  box to learn more about \"Urinary Tract Infection (UTI) in Women: Care Instructions. \"     If you do not have an account, please click on the \"Sign Up Now\" link. Current as of: October 18, 2021               Content Version: 13.2  © 7413-6841 Healthwise, Incorporated. Care instructions adapted under license by [facility] ([facility]). If you have questions about a medical condition or this instruction, always ask your healthcare professional. [address] any warranty or liability for your use of this information.

## 2022-06-14 ENCOUNTER — HOSPITAL ENCOUNTER (OUTPATIENT)
Dept: LAB | Age: 77
Discharge: HOME | End: 2022-06-14
Payer: MEDICARE

## 2022-06-14 ENCOUNTER — OFFICE VISIT (OUTPATIENT)
Dept: FAMILY MEDICINE CLINIC | Age: 77
End: 2022-06-14
Payer: MEDICARE

## 2022-06-14 VITALS
OXYGEN SATURATION: 98 % | BODY MASS INDEX: 30.12 KG/M2 | HEART RATE: 73 BPM | DIASTOLIC BLOOD PRESSURE: 62 MMHG | HEIGHT: 63 IN | SYSTOLIC BLOOD PRESSURE: 118 MMHG | WEIGHT: 170 LBS | TEMPERATURE: 97.6 F

## 2022-06-14 DIAGNOSIS — M25.511 CHRONIC RIGHT SHOULDER PAIN: ICD-10-CM

## 2022-06-14 DIAGNOSIS — G89.29 CHRONIC RIGHT SHOULDER PAIN: ICD-10-CM

## 2022-06-14 DIAGNOSIS — R30.0 DYSURIA: Primary | ICD-10-CM

## 2022-06-14 DIAGNOSIS — Z11.1 TUBERCULOSIS SCREENING: ICD-10-CM

## 2022-06-14 DIAGNOSIS — Z12.31 BREAST CANCER SCREENING BY MAMMOGRAM: ICD-10-CM

## 2022-06-14 DIAGNOSIS — E66.8 OTHER OBESITY: ICD-10-CM

## 2022-06-14 DIAGNOSIS — E03.9 HYPOTHYROIDISM, UNSPECIFIED TYPE: ICD-10-CM

## 2022-06-14 DIAGNOSIS — R30.0 DYSURIA: ICD-10-CM

## 2022-06-14 DIAGNOSIS — R73.09 ELEVATED GLUCOSE: ICD-10-CM

## 2022-06-14 DIAGNOSIS — R35.0 INCREASED URINARY FREQUENCY: Primary | ICD-10-CM

## 2022-06-14 DIAGNOSIS — F90.9 ATTENTION DEFICIT HYPERACTIVITY DISORDER (ADHD), UNSPECIFIED ADHD TYPE: ICD-10-CM

## 2022-06-14 DIAGNOSIS — M85.80 OSTEOPENIA, UNSPECIFIED LOCATION: ICD-10-CM

## 2022-06-14 LAB
-: ABNORMAL
BACTERIA: ABNORMAL
BILIRUBIN URINE: NEGATIVE
EPITHELIAL CELLS UA: ABNORMAL /HPF (ref 0–5)
GLUCOSE URINE: NEGATIVE
KETONES, URINE: NEGATIVE
LEUKOCYTE ESTERASE, URINE: NEGATIVE
NITRITE, URINE: NEGATIVE
PH UA: 5.5 (ref 5–6)
PROTEIN UA: NEGATIVE
RBC UA: ABNORMAL /HPF (ref 0–4)
SPECIFIC GRAVITY UA: 1.01 (ref 1.01–1.02)
URINE HGB: ABNORMAL
UROBILINOGEN, URINE: NORMAL
WBC UA: ABNORMAL /HPF (ref 0–4)

## 2022-06-14 PROCEDURE — G8427 DOCREV CUR MEDS BY ELIG CLIN: HCPCS | Performed by: FAMILY MEDICINE

## 2022-06-14 PROCEDURE — 1036F TOBACCO NON-USER: CPT | Performed by: FAMILY MEDICINE

## 2022-06-14 PROCEDURE — 1123F ACP DISCUSS/DSCN MKR DOCD: CPT | Performed by: FAMILY MEDICINE

## 2022-06-14 PROCEDURE — G8400 PT W/DXA NO RESULTS DOC: HCPCS | Performed by: FAMILY MEDICINE

## 2022-06-14 PROCEDURE — 99215 OFFICE O/P EST HI 40 MIN: CPT | Performed by: FAMILY MEDICINE

## 2022-06-14 PROCEDURE — 81001 URINALYSIS AUTO W/SCOPE: CPT

## 2022-06-14 PROCEDURE — 1090F PRES/ABSN URINE INCON ASSESS: CPT | Performed by: FAMILY MEDICINE

## 2022-06-14 PROCEDURE — 3017F COLORECTAL CA SCREEN DOC REV: CPT | Performed by: FAMILY MEDICINE

## 2022-06-14 PROCEDURE — 99213 OFFICE O/P EST LOW 20 MIN: CPT

## 2022-06-14 PROCEDURE — G8417 CALC BMI ABV UP PARAM F/U: HCPCS | Performed by: FAMILY MEDICINE

## 2022-06-14 RX ORDER — IBANDRONATE SODIUM 150 MG/1
150 TABLET, FILM COATED ORAL
Qty: 3 TABLET | Refills: 3 | Status: SHIPPED | OUTPATIENT
Start: 2022-06-14

## 2022-06-14 RX ORDER — DEXTROAMPHETAMINE SACCHARATE, AMPHETAMINE ASPARTATE MONOHYDRATE, DEXTROAMPHETAMINE SULFATE AND AMPHETAMINE SULFATE 2.5; 2.5; 2.5; 2.5 MG/1; MG/1; MG/1; MG/1
10 CAPSULE, EXTENDED RELEASE ORAL DAILY
Qty: 30 CAPSULE | Refills: 0 | Status: CANCELLED | OUTPATIENT
Start: 2022-06-14 | End: 2022-07-14

## 2022-06-14 NOTE — PROGRESS NOTES
LENKA JADE [address], [address] [place] [address] Drive                        Telephone [phone]             Fax [phone]       Kera Mcintyre  :  1945  Age:  78 y.o. MRN:  [id number]  Date of visit:  2022       Assessment and Plan:    1. Increased urinary frequency  I reviewed the results of the urinalysis done today with the patient. I advised her that today's urinalysis is not consistent with a urinary tract infection. 2. Osteopenia, unspecified location  I discussed the results of the DEXA scan done 2021 with the patient. I recommended beginning treatment. Boniva was prescribed:  - ibandronate (BONIVA) 150 MG tablet; Take 1 tablet by mouth every 30 days Take one (1) tablet once per month in the morning with a full glass of water, on an empty stomach, and do not take anything else by mouth or lie down for the next 60 minutes. Dispense: 3 tablet; Refill: 3     - Vitamin D 25 Hydroxy; Future was ordered. Printed information regarding Deciding About Bisphosphonate Medicine for Osteoporosis was provided to the patient with the after visit summary. Printed information regarding Learning About Low Bone Density was provided to the patient with the after visit summary. 3. Chronic right shoulder pain  She continues to have pain after a course of physical therapy. A  referral to orthopedic surgery was ordered:  - Ambulatory referral to Orthopedic Surgery    4. Hypothyroidism, unspecified type  5. Elevated glucose  6. Other obesity   Labs were ordered to be done when she returns fasting:  - Comprehensive Metabolic Panel; Future  - Lipid Panel; Future  - Hemoglobin A1C; Future  - TSH with Reflex; Future was ordered to be done when she returns fasting. She will be contacted when the results are available.      7. Attention deficit hyperactivity disorder (ADHD), unspecified ADHD type  I discussed with the patient that we discussed controlled medication prescriptions at her previous visit with me on 5/4/2021. She has not consistently been taking Adderall. I recommended evaluation by psychiatry. After discussion, she feels that she can continue with adaptations to improve focus, and she does not want a prescription at this time. 8. Tuberculosis screening  TB testing was initiated. - tuberculin injection 5 Units    9. Routine health maintenance  Health maintenance was reviewed with the patient. She has received three doses of the Pfizer Covid-19 vaccine. She was advised that a fourth dose of the Covid-19 vaccine is recommended. Screening mammogram was recommended and ordered. All other health maintenance, including Shingrix, Tdap, Pneumovax, and Prevnar-13, is up to date at this time. Follow up instructions were given to the patient:  She was advised to keep the appointment that she has scheduled with me on 8/9/2022. Subjective:    Surendra Jc is a 78 y.o. female who presents to [place] [address] today (6/14/2022) for follow up/evaluation of: Other (Patient following up from Urgent Care visit on 6/3/22. Finished the antibiotics that were prescribed at visit. Reports frequency at times, but does drink a lot of fluids. No pain or blood noted in urine.) and Other (Needs Tb ppd for new employment.)      Her appointment today was for follow up of urinary symptoms. She has multiple additional concerns. She was seen at Urgent Care on 6/3/2022. Macrobid 100 mg BID x 7 days was prescribed for a urinary tract infection. She states that her symptoms are improved. She does report that she has been urinating frequently. She has been trying to increase her water intake. She reports continued pain in the right shoulder. She was evaluated for right shoulder pain at her   last office visit with me on 5/4/2021.    She had an x-ray of the right shoulder after that visit that showed changes consistent with arthritis. She was referred to physical therapy. She has finished physical therapy, and she states that she continues to have pain in the right shoulder. She requested a refill of Adderall. This was last prescribed 12/13/2021 by Kylah Bledsoe. I had previously refilled Adderall at her last office visit with me on 5/4/2021. She requests a TB test today. She needs this for a new job. She has received three doses of the Pfizer Covid-19 vaccine. The most recent dose was 12/8/2021. Immunization history was reviewed:  Immunization History   Administered Date(s) Administered    COVID-19, Pfizer Purple top, DILUTE for use, 12+ yrs, 30mcg/0.3mL dose 03/17/2021, 04/14/2021, 12/08/2021    Influenza Vaccine, unspecified formulation 10/02/2017    Influenza, High Dose (Fluzone 65 yrs and older) 11/28/2018, 11/01/2019    Influenza, Quadv, adjuvanted, 65 yrs +, IM, PF (Fluad) 10/05/2020, 12/08/2021    PPD Test 04/30/2021, 12/08/2021, 06/14/2022    Pneumococcal Conjugate 13-valent ([id number]) 01/29/2019    Pneumococcal Polysaccharide ([id number]) 07/12/2004, 09/18/2007, 08/22/2013    Td, unspecified formulation 05/04/2018    Tdap (Boostrix, Adacel) 09/03/2012, 11/28/2018    Zoster Recombinant (Shingrix) 03/01/2019, 05/15/2019          She has the following problem list:  Patient Active Problem List   Diagnosis    Vitamin D deficiency    Overweight (BMI 25.0-29. 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 Heart sounds are normal.  Regular rate and rhythm without murmur, gallop or rub. Back has no costovertebral angle tenderness. Abdomen is soft, non-tender, non-distended. Lower extremities have no edema. There is good range of motion of the shoulders bilaterally. Affect is bright. Thought processes are logical. There is no evidence of paranoia or delusions. Responses to questions are appropriate. Dress and grooming are appropriate.      Results of labs done 6/14/2022 were reviewed with the patient:   Hospital Outpatient Visit on 06/14/2022   Component Date Value Ref Range Status    Glucose, Ur 06/14/2022 NEGATIVE  NEGATIVE Final    Bilirubin Urine 06/14/2022 NEGATIVE  NEGATIVE Final    Ketones, Urine 06/14/2022 NEGATIVE  NEGATIVE Final    Specific Gravity, UA 06/14/2022 1.015  1.010 - 1.025 Final    Urine Hgb 06/14/2022 TRACE* NEGATIVE Final    pH, UA 06/14/2022 5.5  5.0 - 6.0 Final    Protein, UA 06/14/2022 NEGATIVE  NEGATIVE Final    Urobilinogen, Urine 06/14/2022 Normal  Normal Final    Nitrite, Urine 06/14/2022 NEGATIVE  NEGATIVE Final    Leukocyte Esterase, Urine 06/14/2022 NEGATIVE  NEGATIVE Final    - 06/14/2022        Final    WBC, UA 06/14/2022 0 TO 4  0 - 4 /HPF Final    RBC, UA 06/14/2022 0 TO 4  0 - 4 /HPF Final    Epithelial Cells UA 06/14/2022 0 TO 4  0 - 5 /HPF Final    Bacteria, UA 06/14/2022 TRACE* None Final   Hospital Outpatient Visit on 06/03/2022   Component Date Value Ref Range Status    Glucose, Ur 06/03/2022 NEGATIVE  NEGATIVE Final    Bilirubin Urine 06/03/2022 NEGATIVE  NEGATIVE Final    Ketones, Urine 06/03/2022 NEGATIVE  NEGATIVE Final    Specific Gravity, UA 06/03/2022 1.010  1.010 - 1.025 Final    Urine Hgb 06/03/2022 TRACE* NEGATIVE Final    pH, UA 06/03/2022 6.0  5.0 - 6.0 Final    Protein, UA 06/03/2022 NEGATIVE  NEGATIVE Final    Urobilinogen, Urine 06/03/2022 Normal  Normal Final    Nitrite, Urine 06/03/2022 NEGATIVE NEGATIVE Final    Leukocyte Esterase, Urine 06/03/2022 1+* NEGATIVE Final    - 06/03/2022        Final    WBC, UA 06/03/2022 5 TO 10  0 - 4 /HPF Final    RBC, UA 06/03/2022 0 TO 4  0 - 4 /HPF Final    Epithelial Cells UA 06/03/2022 5 TO 10  0 - 5 /HPF Final    Bacteria, UA 06/03/2022 TRACE* None Final       Results of the DEXA scan done 12/28/2021 were reviewed with the patient:  FRAX:       Based on the provided screening reformation and the lowest femoral neck BMD   value, the patient's 10-year risk for major osteoporotic fracture is 13.6%   and for hip fracture is 3.5%.             Impression   According to the [address]) classification:       Osteopenia.           National Osteoporosis Foundation (NOF) guidelines recommend treatment for   patients with a T score of -1.5 and below with risk factors or -2.0 and below   without risk factors. The selective therapies are available in the form of   diphosphonate (Fosamax and Actonel), and Evista. Alternatively hormone   therapy may be an option based on review of risk and benefits of the   treatment. All patients should ensure an adequate intake of the dietary   calcium (1200 mg/d) and vitamin D (400-800 international units daily).           On this date 6/14/2022 I have spent over 45 minutes reviewing previous notes, test results and face to face with the patient discussing the diagnosis and importance of compliance with the treatment plan as well as documenting on the day of the visit.     (Please note that portions of this note were completed with a voice-recognition program. Efforts were made to edit the dictation but occasionally words are mis-transcribed.)

## 2022-06-14 NOTE — PATIENT INSTRUCTIONS
Patient Education        Deciding About Bisphosphonate Medicine for Osteoporosis  How can you decide about taking bisphosphonate medicine for osteoporosis? This information is right for you if you are a woman who has been through menopause. If that does not describe you, or if you're not sure, talk with yourdoctor about this decision. What is osteoporosis? Osteoporosis is a disease that affects your bones. It means you have bones that are thin and brittle and have lots of holes inside them like a sponge. This makes them easy to break. It can lead to broken bones (fractures), especially in the hip, spine, and wrist. These fractures may make it hard for you to liveon your own. Bisphosphonates are the most common medicines used to prevent bone loss. They may be taken as a pill or an injection into a vein. Bisphosphonates slow the way bone dissolves and is absorbed by your body. They can increase bonethickness and strength. What are key points about this decision?  If you are at a higher risk of having a fracture, taking bisphosphonates is more likely to help you prevent a fracture. If your risk of a fracture is lower, it's less likely that these medicines will help you.  Bisphosphonates can cause problems with the jaw or thigh bone. But most women do not have these side effects.  Whether you take medicine or not, healthy habits can help protect your bones. Get enough calcium and vitamin D. Get regular weight-bearing exercise. Cut back on alcohol. And if you smoke, quit. Who is helped the most by bisphosphonates? For women who have been through menopause:   If you have osteoporosis (your T-score is -2.5 or less) or you have had a fracture, taking bisphosphonates lowers your risk of having a fracture.  If you haven't had a fracture and you have low bone density (your T-score is between -1.0 and -2.5, sometimes called osteopenia), taking bisphosphonates might lower your risk of having a fracture.  This evidence is not as strong. What are the side effects of bisphosphonates? These medicines can have side effects, such as heartburn and belly pain. Certain bone problems have also been reported in women taking bisphosphonates. Out of 1,000 people, about 1 person has a bone side effect during a year of taking bisphosphonates. That means 999 out of 1,000 people do not have a boneside effect. These bone side effects include problems with the jaw bone (called osteonecrosis). They also might include a certain kind of fracture of the thigh bone (called an atypical fracture), but more research is needed to find out iftaking bisphosphonates is a cause of these fractures. Your decision  Thinking about the facts and your feelings can help you make a decision that is right for you. Be sure you understand the benefits and risks of your options,and think about what else you need to do before you make the decision. Where can you learn more? Go to https://MagiqstevieTrustEgg.LUX [employer]ure. org and sign in to your centrose account. Enter F035 in the ColonaryConcepts box to learn more about \"Deciding About Bisphosphonate Medicine for Osteoporosis. \"     If you do not have an account, please click on the \"Sign Up Now\" link. Current as of: September 8, 2021               Content Version: 13.2  © 2006-2022 [employer]. Care instructions adapted under license by [facility] ([facility]). If you have questions about a medical condition or this instruction, always ask your healthcare professional. [place] [address] any warranty or liability for your use of this information. Patient Education        Learning About Low Bone Density  What is low bone density? Low bone density (sometimes called osteopenia) is a decrease in thickness, or density, in bones. That means the bones become thinner and weaker. It is muchmore common in women than in men. It's important to know that low bone density is not a disease. It can happen normally with aging. Having low bone density means that there is a greater risk that you may get osteoporosis. It also means that you are more likely to break a bone than someone who does not have low bone density. But not everyone withlow bone density gets osteoporosis or breaks a bone. Low bone density doesn't cause any symptoms. It's usually found with a type of X-ray called a bone density test. Low bone density means that your bone densityresult (T-score) is between -1.0 and -2.5. What increases your risk for low bone density? Things that increase your risk include:   Getting older.  Having a family history of osteoporosis.  Being thin.  Being white or .  Getting too little physical activity.  Smoking.  Drinking too much alcohol often.  Using certain medicines such as steroids. How can you prevent osteoporosis? There are things you can do to help prevent osteoporosis. Certain lifestylechanges will help slow the loss of bone density.  Eat food that has plenty of calcium and vitamin D. Yogurt, cheese, milk, and dark green vegetables are high in calcium. Eggs, fatty fish, cereal, and fortified milk are high in vitamin D.   Ask your doctor if you need to take a calcium plus vitamin D supplement. You may be able to get enough calcium and vitamin D through your diet.  Get regular exercise. ? Do 30 minutes of weight-bearing exercise on most days of the week. Walking, jogging, stair climbing, and dancing are good choices. ? Do resistance exercises with weights or elastic bands 2 or 3 days a week.  Limit alcohol to 2 drinks a day for men and 1 drink a day for women. Too much alcohol can cause health problems.  Do not smoke. Smoking can make bones thin faster. If you need help quitting, talk to your doctor about stop-smoking programs and medicines. These can increase your chances of quitting for good. Prescription medicines are available for treating low bone density.  But theseare more often used to treat osteoporosis. Follow-up care is a key part of your treatment and safety. Be sure to make and go to all appointments, and call your doctor if you are having problems. It's also a good idea to know your test results and keep alist of the medicines you take. Where can you learn more? Go to https://chpepiceweb.[employer]. org and sign in to your AlertMe account. Enter Y374 in the GeoPage box to learn more about \"Learning About Low Bone Density. \"     If you do not have an account, please click on the \"Sign Up Now\" link. Current as of: September 8, 2021               Content Version: 13.2  © 2006-2022 [employer]. Care instructions adapted under license by [facility]. If you have questions about a medical condition or this instruction, always ask your healthcare professional. [address] any warranty or liability for your use of this information.

## 2022-06-15 ENCOUNTER — OFFICE VISIT (OUTPATIENT)
Dept: OPTOMETRY | Age: 77
End: 2022-06-15
Payer: MEDICARE

## 2022-06-15 DIAGNOSIS — H52.03 HYPEROPIA OF BOTH EYES WITH ASTIGMATISM AND PRESBYOPIA: ICD-10-CM

## 2022-06-15 DIAGNOSIS — H52.4 HYPEROPIA OF BOTH EYES WITH ASTIGMATISM AND PRESBYOPIA: ICD-10-CM

## 2022-06-15 DIAGNOSIS — H53.8 BLURRED VISION, BILATERAL: Primary | ICD-10-CM

## 2022-06-15 DIAGNOSIS — H53.2 DIPLOPIA: ICD-10-CM

## 2022-06-15 DIAGNOSIS — H52.203 HYPEROPIA OF BOTH EYES WITH ASTIGMATISM AND PRESBYOPIA: ICD-10-CM

## 2022-06-15 DIAGNOSIS — Z96.1 PSEUDOPHAKIA OF BOTH EYES: ICD-10-CM

## 2022-06-15 PROCEDURE — G8417 CALC BMI ABV UP PARAM F/U: HCPCS | Performed by: OPTOMETRIST

## 2022-06-15 PROCEDURE — 1036F TOBACCO NON-USER: CPT | Performed by: OPTOMETRIST

## 2022-06-15 PROCEDURE — G8427 DOCREV CUR MEDS BY ELIG CLIN: HCPCS | Performed by: OPTOMETRIST

## 2022-06-15 PROCEDURE — 1090F PRES/ABSN URINE INCON ASSESS: CPT | Performed by: OPTOMETRIST

## 2022-06-15 PROCEDURE — 92015 DETERMINE REFRACTIVE STATE: CPT | Performed by: OPTOMETRIST

## 2022-06-15 PROCEDURE — 1123F ACP DISCUSS/DSCN MKR DOCD: CPT | Performed by: OPTOMETRIST

## 2022-06-15 PROCEDURE — 99214 OFFICE O/P EST MOD 30 MIN: CPT | Performed by: OPTOMETRIST

## 2022-06-15 PROCEDURE — G8400 PT W/DXA NO RESULTS DOC: HCPCS | Performed by: OPTOMETRIST

## 2022-06-15 PROCEDURE — 3017F COLORECTAL CA SCREEN DOC REV: CPT | Performed by: OPTOMETRIST

## 2022-06-15 PROCEDURE — 99211 OFF/OP EST MAY X REQ PHY/QHP: CPT

## 2022-06-15 ASSESSMENT — ENCOUNTER SYMPTOMS
EYES NEGATIVE: 0
RESPIRATORY NEGATIVE: 0
ALLERGIC/IMMUNOLOGIC NEGATIVE: 0
GASTROINTESTINAL NEGATIVE: 0

## 2022-06-15 ASSESSMENT — KERATOMETRY
OD_AXISANGLE_DEGREES: 069
OS_AXISANGLE_DEGREES: 084
OS_K1POWER_DIOPTERS: 45.00
OS_AXISANGLE2_DEGREES: 174
OS_K2POWER_DIOPTERS: 45.50
METHOD_AUTO_MANUAL: AUTOMATED
OD_AXISANGLE2_DEGREES: 159
OD_K2POWER_DIOPTERS: 45.50
OD_K1POWER_DIOPTERS: 44.50

## 2022-06-15 ASSESSMENT — REFRACTION_MANIFEST
OD_CYLINDER: -1.00
OS_AXIS: 120
OD_AXIS: 138
OD_ADD: +2.25
OS_ADD: +2.25
OS_CYLINDER: -0.25
OS_SPHERE: +0.50
OD_SPHERE: +0.50

## 2022-06-15 ASSESSMENT — VISUAL ACUITY
OS_SC: 20/25
METHOD: SNELLEN - LINEAR
OD_SC: 20/25
OS_SC+: -3
OD_SC+: -1

## 2022-06-15 ASSESSMENT — REFRACTION_WEARINGRX
SPECS_TYPE: RRX
OS_SPHERE: +2.75
OD_AXIS: 175
OD_CYLINDER: -1.00
OS_CYLINDER: DS
OD_SPHERE: +3.25

## 2022-06-15 ASSESSMENT — TONOMETRY
OD_IOP_MMHG: 15
OS_IOP_MMHG: 15
IOP_METHOD: NON-CONTACT AIR PUFF

## 2022-06-15 ASSESSMENT — SLIT LAMP EXAM - LIDS
COMMENTS: NORMAL
COMMENTS: NORMAL

## 2022-06-15 NOTE — PROGRESS NOTES
Ashley Josue presents today for   Chief Complaint   Patient presents with    Blurred Vision    Diplopia    Vision Exam   .    HPI     Blurred Vision     In both eyes. Vision is difficult to focus and blurred. Diplopia     In both eyes. Disease is new. Duration of 2 months. Occurring intermittently. Occurring when tired. Comments     Last Vision Exam: 9/17/2020 AW  Last Ophthalmology Exam: cat sx ou 2020 Dr. Clemente Moritz. Martha Allen Rx: 9/17/2020 RRX  Insurance: [facility]  Update: Glasses ; Double vision check x 2 months. States while she is tired she notices eyes sort of cross together and seeing double side by side. For example one episode while driving felt like the cars in the oncoming johanny were in her johanny                  Current Outpatient Medications   Medication Sig Dispense Refill    ibandronate (BONIVA) 150 MG tablet Take 1 tablet by mouth every 30 days Take one (1) tablet once per month in the morning with a full glass of water, on an empty stomach, and do not take anything else by mouth or lie down for the next 60 minutes. 3 tablet 3    blood glucose test strips (ACCU-CHEK IRVIN PLUS) strip TEST BLOOD SUGAR ONCE DAILY AND AS NEEDED FOR SYMPTOMS OF IRREGULAR BLOOD SUGAR 100 strip 1    blood glucose monitor kit and supplies Dispense sufficient amount for indicated testing frequency plus additional to accommodate PRN testing needs. Dispense all needed supplies to include: monitor, strips, lancing device, lancets, control solutions, alcohol swabs. Testing 1 x daily 1 kit 0    tretinoin (RETIN-A) 0.1 % cream Apply topically nightly.  (Patient not taking: Reported on 6/14/2022) 45 g 0    Accu-Chek Softclix Lancets MISC Use to check blood glucose daily or as needed for hypo/hyperglycemia 100 each 2    Blood Glucose Monitoring Suppl (ACCU-CHEK IRVIN PLUS) w/Device KIT USE TO TEST BLOOD SUGAR DAILY AND AS NEEDED FOR SYMPTOMS OF IRREGULAR BLOOD SUGAR 1 kit 0    amphetamine-dextroamphetamine (ADDERALL XR) 10 MG extended release capsule Take 1 capsule by mouth daily for 30 days. 30 capsule 0    Cholecalciferol (VITAMIN D3) 5000 UNITS TABS Take 5,000 Units by mouth daily      co-enzyme Q-10 50 MG capsule Take 50 mg by mouth daily      magnesium oxide (MAG-OX) 400 MG tablet Take 500 mg by mouth daily       Omega-3 300 MG CAPS Take 300 mg by mouth daily       Current Facility-Administered Medications   Medication Dose Route Frequency Provider Last Rate Last Admin    tuberculin injection 5 Units  5 Units IntraDERmal Once Petty Bunn MD   5 Units at 06/14/22 1532         Family History   Problem Relation Age of Onset    Cataracts Father     Diabetes Father     Alzheimer's Disease Mother     Diabetes Brother     Diabetes Sister     Glaucoma Neg Hx      Social History     Socioeconomic History    Marital status: Single     Spouse name: None    Number of children: None    Years of education: None    Highest education level: None   Occupational History    None   Tobacco Use    Smoking status: Never Smoker    Smokeless tobacco: Never Used   Vaping Use    Vaping Use: Never used   Substance and Sexual Activity    Alcohol use: No    Drug use: No    Sexual activity: Never   Other Topics Concern    None   Social History Narrative    None     Social Determinants of Health     Financial Resource Strain: Unknown    Difficulty of Paying Living Expenses: Patient refused   Food Insecurity: Unknown    Worried About Running Out of Food in the Last Year: Patient refused    Ran Out of Food in the Last Year: Patient refused   Transportation Needs:     Lack of Transportation (Medical): Not on file    Lack of Transportation (Non-Medical):  Not on file   Physical Activity:     Days of Exercise per Week: Not on file    Minutes of Exercise per Session: Not on file   Stress:     Feeling of Stress : Not on file   Social Connections:     Frequency of Communication with Friends and Family: Not on file    Frequency of Social Gatherings with Friends and Family: Not on file    Attends Latter-day Services: Not on file    Active Member of Clubs or Organizations: Not on file    Attends Club or Organization Meetings: Not on file    Marital Status: Not on file   Intimate Partner Violence:     Fear of Current or Ex-Partner: Not on file    Emotionally Abused: Not on file    Physically Abused: Not on file    Sexually Abused: Not on file   Housing Stability:     Unable to Pay for Housing in the Last Year: Not on file    Number of Places Lived in the Last Year: Not on file    Unstable Housing in the Last Year: Not on file     Past Medical History:   Diagnosis Date    ADHD (attention deficit hyperactivity disorder)     Dysphagia     schatzki ring    Hyperglycemia     Vitamin D deficiency        ROS     Negative for: Constitutional, Gastrointestinal, Neurological, Skin, Genitourinary, Musculoskeletal, HENT, Endocrine, Cardiovascular, Eyes, Respiratory, Psychiatric, Allergic/Imm, Heme/Lymph          Main Ophthalmology Exam     External Exam       Right Left    External Normal Normal          Slit Lamp Exam       Right Left    Lids/Lashes Normal Normal    Conjunctiva/Sclera White and quiet White and quiet    Cornea Clear Clear    Anterior Chamber Deep and quiet Deep and quiet    Iris Round and reactive Round and reactive    Lens Posterior chamber intraocular lens Posterior chamber intraocular lens    Vitreous Normal Normal          Fundus Exam       Right Left    Disc Normal Normal    C/D Ratio 0.2 0.2    Macula Normal Normal    Vessels Normal Normal             <div id=\"MAIN_EXAM_REVIEWED\"></div>      Tonometry     Tonometry (Non-contact air puff, 11:05 AM)       Right Left    Pressure 15 15      Right / Left  IOPg 13.1 / 13.1  CH 9.9 / 9.9  WS 9.3 / 8.3                     Not recorded       Not recorded         Visual Acuity (Snellen - Linear)       Right Left Dist sc 20/25 -1 20/25 -3          Pupils     Pupils       Pupils    Right PERRL    Left PERRL              Neuro/Psych     Neuro/Psych     Oriented x3: Yes    Mood/Affect: Normal              Keratometry     Keratometry (Automated)       K1 Axis K2 Axis    Right 44.50 159 45.50 069    Left 45.00 174 45.50 084                  Ophthalmology Exam     Wearing Rx       Sphere Cylinder Axis Add    Right +3.25 -1.00 175     Left +2.75 ds      Age: 4yrs    Type: RRX           Wearing Rx #2       Sphere Cylinder Axis Add    Right +0.75 -1.00 175 +2.50    Left +0.25 ds  +2.50    Age: 4yrs    Type: PAL : NOT FILLED              Manifest Refraction     Manifest Refraction       Sphere Cylinder Axis Dist VA Add    Right +0.50 -1.00 138 20/20 +2.25    Left +0.50 -0.25 120 20/20 +2.25          Manifest Refraction #2 (Auto)       Sphere Cylinder Axis Dist VA Add    Right +0.50 -1.00 155      Left +0.50 -0.25 118                 Final Rx       Sphere Cylinder Axis Dist VA Add    Right +0.50 -1.00 138 20/20 +2.25    Left +0.50 -0.25 120 20/20 +2.25    Type: SVL distance only    Expiration Date: 6/15/2024      Final Rx #2       Sphere Cylinder Axis Dist VA Add    Right +2.75 -1.00 138      Left +2.75 -0.25 120      Type: RRX     Expiration Date: 6/15/2024            No orders of the defined types were placed in this encounter. IMPRESSION:  1. Blurred vision, bilateral    2. Diplopia    3. Hyperopia of both eyes with astigmatism and presbyopia    4. Pseudophakia of both eyes        PLAN:    1. New glasses recommended for distance and near ;  2. No double vision in the office today and no prism was able to be measured  3. New glasses recommended       There are no Patient Instructions on file for this visit.    Return in about 1 year (around 6/15/2023) for complete eye exam.

## 2022-06-16 ENCOUNTER — TELEPHONE (OUTPATIENT)
Dept: FAMILY MEDICINE CLINIC | Age: 77
End: 2022-06-16

## 2022-06-16 ENCOUNTER — HOSPITAL ENCOUNTER (OUTPATIENT)
Dept: MAMMOGRAPHY | Age: 77
Discharge: HOME | End: 2022-06-18
Payer: MEDICARE

## 2022-06-16 VITALS — BODY MASS INDEX: 31.28 KG/M2 | WEIGHT: 170 LBS | HEIGHT: 62 IN

## 2022-06-16 DIAGNOSIS — Z12.31 BREAST CANCER SCREENING BY MAMMOGRAM: ICD-10-CM

## 2022-06-16 PROCEDURE — 77063 BREAST TOMOSYNTHESIS BI: CPT

## 2022-06-16 NOTE — TELEPHONE ENCOUNTER
Patient in office to have TB test read on RFA. 0MM.  Negative Test. Patient will return next week for 2nd step of TB test.

## 2022-06-16 NOTE — TELEPHONE ENCOUNTER
Patient returned call. Patient is seeing [address] [place] [address] and they are questioning if patient has a diagnosis of hypothyroidism. Patient does not have any knowledge of having abnormal thyroid labs, but does have a family history of thyroid issues. Please advise.     Last OV:5/4/21  Next OV:8/9/22

## 2022-06-16 NOTE — TELEPHONE ENCOUNTER
Message left with patient requesting return call. Patient has had dx of hypothyroidism that started with new patient appt on 11/28/2018. No history of use of Levothyroxine. TSH/T4 historically appears to be within normal limits. Awaiting return call.

## 2022-06-16 NOTE — TELEPHONE ENCOUNTER
Oxana was in for appointment yesterday and received a AVS. On AVS it lists hypothyroidism and patient states she does not have this diagnosis. Patient would like to know why this is listed on her chart. If she does have it shouldn't she be treated for it?

## 2022-06-16 NOTE — TELEPHONE ENCOUNTER
Please advise the patient that the diagnosis was in her electronic record prior to her first visit with me on 11/28/2018. I am not certain when the diagnosis was made initially. I can remove this from her chart if it is not an accurate diagnosis.

## 2022-06-22 DIAGNOSIS — M25.511 RIGHT SHOULDER PAIN, UNSPECIFIED CHRONICITY: Primary | ICD-10-CM

## 2022-06-28 ENCOUNTER — OFFICE VISIT (OUTPATIENT)
Dept: ORTHOPEDIC SURGERY | Age: 77
End: 2022-06-28
Payer: MEDICARE

## 2022-06-28 ENCOUNTER — HOSPITAL ENCOUNTER (OUTPATIENT)
Dept: GENERAL RADIOLOGY | Age: 77
Discharge: HOME | End: 2022-06-30
Payer: MEDICARE

## 2022-06-28 VITALS
WEIGHT: 170 LBS | SYSTOLIC BLOOD PRESSURE: 138 MMHG | DIASTOLIC BLOOD PRESSURE: 80 MMHG | HEIGHT: 62 IN | HEART RATE: 60 BPM | BODY MASS INDEX: 31.28 KG/M2

## 2022-06-28 DIAGNOSIS — M25.512 CHRONIC PAIN OF BOTH SHOULDERS: Primary | ICD-10-CM

## 2022-06-28 DIAGNOSIS — G89.29 CHRONIC PAIN OF BOTH SHOULDERS: Primary | ICD-10-CM

## 2022-06-28 DIAGNOSIS — M25.511 CHRONIC PAIN OF BOTH SHOULDERS: Primary | ICD-10-CM

## 2022-06-28 DIAGNOSIS — M25.511 RIGHT SHOULDER PAIN, UNSPECIFIED CHRONICITY: ICD-10-CM

## 2022-06-28 PROCEDURE — 99203 OFFICE O/P NEW LOW 30 MIN: CPT | Performed by: PHYSICIAN ASSISTANT

## 2022-06-28 PROCEDURE — 1090F PRES/ABSN URINE INCON ASSESS: CPT | Performed by: PHYSICIAN ASSISTANT

## 2022-06-28 PROCEDURE — 99213 OFFICE O/P EST LOW 20 MIN: CPT | Performed by: PHYSICIAN ASSISTANT

## 2022-06-28 PROCEDURE — 1036F TOBACCO NON-USER: CPT | Performed by: PHYSICIAN ASSISTANT

## 2022-06-28 PROCEDURE — G8417 CALC BMI ABV UP PARAM F/U: HCPCS | Performed by: PHYSICIAN ASSISTANT

## 2022-06-28 PROCEDURE — G8427 DOCREV CUR MEDS BY ELIG CLIN: HCPCS | Performed by: PHYSICIAN ASSISTANT

## 2022-06-28 PROCEDURE — 3017F COLORECTAL CA SCREEN DOC REV: CPT | Performed by: PHYSICIAN ASSISTANT

## 2022-06-28 PROCEDURE — 1123F ACP DISCUSS/DSCN MKR DOCD: CPT | Performed by: PHYSICIAN ASSISTANT

## 2022-06-28 PROCEDURE — G8400 PT W/DXA NO RESULTS DOC: HCPCS | Performed by: PHYSICIAN ASSISTANT

## 2022-06-28 PROCEDURE — 73030 X-RAY EXAM OF SHOULDER: CPT

## 2022-06-28 RX ORDER — METHYLPREDNISOLONE 4 MG/1
TABLET ORAL
Qty: 1 KIT | Refills: 0 | Status: SHIPPED | OUTPATIENT
Start: 2022-06-28 | End: 2022-07-04

## 2022-06-28 NOTE — PROGRESS NOTES
Orthopaedic Progress Note      CHIEF COMPLAINT: Bilateral shoulder pain    HISTORY OF PRESENT ILLNESS:       Ms. Brody Arias  is a 78 y.o. female  who presents with bilateral shoulder pain. Patient states is worse with range of motion. She has been doing some new exercises with increasing pain. He is neurovascular tact sensation intact. Has any issues with reaching across the body or overhead or reaching back for seatbelt.       Past Medical History:    Past Medical History:   Diagnosis Date    ADHD (attention deficit hyperactivity disorder)     Dysphagia     schatzki ring    Hyperglycemia     Vitamin D deficiency        Past Surgical History:    Past Surgical History:   Procedure Laterality Date    BLADDER SUSPENSION  06/24/2020    [address] COLONOSCOPY  08/12/2013    internal hemorrhoids,two sessile polyps ascending colon,few diverticula noted descending colon and sigmoid colon, per Dr Deyanira Landin at [place] [address] COLONOSCOPY N/A 5/26/2020    COLONOSCOPY DIAGNOSTIC performed by Emanuel Walker MD at [facility]    ENDOSCOPY, COLON, DIAGNOSTIC      HEMORRHOID SURGERY  09/25/2020    hemorrhoid banding, Dr. Ethan Santos, [facility] INTRACAPSULAR CATARACT EXTRACTION Left 3/5/2020    Left Cataract Extraction w/ IOL Impant performed by Sharon Mazariegos MD at [facility] Right 8/18/2020    Right Cataract Extraction w/ IOL Implant performed by Mireya Majano MD at [facility] ARTHROSCOPY Left 2006   [employer] OTHER SURGICAL HISTORY  2009    lipoma removed from upper arm    UPPER GASTROINTESTINAL ENDOSCOPY  08/12/2013    fragments of squamous mucosa with minimal,non-specific reactive epithelial changes,negative Hpylori,intestinal metaplasia or dysplasia,negative for active inflammatory changes per Dr Deyanira Landin at [facility] 5/26/2020    EGD ESOPHAGOGASTRODUODENOSCOPY DILATATION performed by Brenden Cherry MD at [address] Left 5/26/2020    EGD BIOPSY performed by Brenden Cherry MD at [facility], biopsies showed chronic esophagitis, chronically inflamed gastric cardia-type mucosa, no intestinal metaplasia or dysplasia         Current  Medications:  Current Outpatient Medications   Medication Sig Dispense Refill    methylPREDNISolone (MEDROL DOSEPACK) 4 MG tablet Take by mouth. 1 kit 0    ibandronate (BONIVA) 150 MG tablet Take 1 tablet by mouth every 30 days Take one (1) tablet once per month in the morning with a full glass of water, on an empty stomach, and do not take anything else by mouth or lie down for the next 60 minutes. 3 tablet 3    blood glucose test strips (ACCU-CHEK IRVIN PLUS) strip TEST BLOOD SUGAR ONCE DAILY AND AS NEEDED FOR SYMPTOMS OF IRREGULAR BLOOD SUGAR 100 strip 1    blood glucose monitor kit and supplies Dispense sufficient amount for indicated testing frequency plus additional to accommodate PRN testing needs. Dispense all needed supplies to include: monitor, strips, lancing device, lancets, control solutions, alcohol swabs. Testing 1 x daily 1 kit 0    tretinoin (RETIN-A) 0.1 % cream Apply topically nightly. 45 g 0    Accu-Chek Softclix Lancets MISC Use to check blood glucose daily or as needed for hypo/hyperglycemia 100 each 2    Blood Glucose Monitoring Suppl (ACCU-CHEK IRVIN PLUS) w/Device KIT USE TO TEST BLOOD SUGAR DAILY AND AS NEEDED FOR SYMPTOMS OF IRREGULAR BLOOD SUGAR 1 kit 0    Cholecalciferol (VITAMIN D3) 5000 UNITS TABS Take 5,000 Units by mouth daily      co-enzyme Q-10 50 MG capsule Take 50 mg by mouth daily      magnesium oxide (MAG-OX) 400 MG tablet Take 500 mg by mouth daily       Omega-3 300 MG CAPS Take 300 mg by mouth daily      amphetamine-dextroamphetamine (ADDERALL XR) 10 MG extended release capsule Take 1 capsule by mouth daily for 30 days.  27 capsule 0     No current facility-administered medications for this visit. Allergies:  Asa [aspirin], Celexa [citalopram hydrobromide], Elemental sulfur, Penicillins, Sulfa antibiotics, and Wasp venom    Social History:   Social History     Tobacco Use   Smoking Status Never Smoker   Smokeless Tobacco Never Used     Social History     Substance and Sexual Activity   Alcohol Use No     Social History     Substance and Sexual Activity   Drug Use No       Family History:  Family History   Problem Relation Age of Onset    Cataracts Father     Diabetes Father     Alzheimer's Disease Mother     Diabetes Brother     Diabetes Sister     Glaucoma Neg Hx        REVIEW OF SYSTEMS:  Constitutional: Denies any fever, chills. Musculoskeletal: Positive for bilateral shoulder pain. PHYSICAL EXAM:  [unfilled]  General appearance:  Alert and oriented x 3. No apparent distress, appears stated age, calm and cooperative. Musculoskeletal: Denies any significant tenderness to palpation along the shoulders bilaterally. She has full range of motion shoulders. Does have some mild weakness with lateral raise against resistance. Negative empty can full can denies any pain with crossover or speeds. DATA:  CBC:   Lab Results   Component Value Date    WBC 5.5 12/03/2018    HGB 13.2 12/03/2018     12/03/2018     BMP:    Lab Results   Component Value Date     12/13/2021    K 4.3 12/13/2021     12/13/2021    CO2 27 12/13/2021    BUN 14 12/13/2021    CREATININE 0.77 12/13/2021    CALCIUM 9.7 12/13/2021    GLUCOSE 89 12/13/2021     PT/INR:  No results found for: PROTIME, INR  Troponin:  No results found for: TROPONINI  No results for input(s): LIPASE, AMYLASE in the last 72 hours. No results for input(s): AST, ALT, BILIDIR, BILITOT, ALKPHOS in the last 72 hours.   Uric Acid:  No components found for: URIC  Urine Culture:  No components found for: CURINE    Radiology:   [facility] MALU DIGITAL SCREEN BILATERAL    Result Date: 6/16/2022  EXAMINATION: SCREENING DIGITAL BILATERAL MAMMOGRAM WITH TOMOSYNTHESIS, 6/16/2022 TECHNIQUE: Screening mammography was performed with tomosynthesis including MLO and CC views of the bilateral breasts. Computer aided detection was used for the interpretation of this exam. COMPARISON: 02/25/2020, 11/10/2016 HISTORY: Screening. FINDINGS: The breast tissue is composed of scattered fibroglandular tissue. There is no suspicious mass, suspicious microcalcification, or area of architectural distortion. Benign calcifications again demonstrated bilaterally. Benign findings. BI-RADS 2 BIRADS: BIRADS - CATEGORY 2 Benign, no evidence of malignancy. Normal interval follow-up is recommended in 12 months. OVERALL ASSESSMENT - BENIGN A letter of notification will be sent to the patient regarding the results. The [employer] of Rad[employer] recommends annual mammograms for women 40 years and older. X-rays personally reviewed by me of mild underlying osteoarthritic changes within this right shoulder at the glenohumeral joint inferiorly       Diagnosis Orders   1. Chronic pain of both shoulders  methylPREDNISolone (MEDROL DOSEPACK) 4 MG tablet    Ambulatory referral to Physical Therapy         PLAN:  Proceed with Medrol Dosepak and physical therapy for bilateral shoulders. No orders of the defined types were placed in this encounter.        Procedure:        Electronically signed by María Sharif PA-C on 6/28/2022 at 9:27 AM

## 2022-06-29 ENCOUNTER — HOSPITAL ENCOUNTER (OUTPATIENT)
Dept: PHYSICAL THERAPY | Age: 77
Setting detail: THERAPIES SERIES
Discharge: HOME | End: 2022-06-29
Payer: MEDICARE

## 2022-06-29 PROCEDURE — 97161 PT EVAL LOW COMPLEX 20 MIN: CPT

## 2022-06-29 ASSESSMENT — PAIN SCALES - GENERAL: PAINLEVEL_OUTOF10: 0

## 2022-06-29 ASSESSMENT — PAIN DESCRIPTION - ORIENTATION: ORIENTATION: RIGHT;LEFT

## 2022-06-29 ASSESSMENT — PAIN - FUNCTIONAL ASSESSMENT: PAIN_FUNCTIONAL_ASSESSMENT: PREVENTS OR INTERFERES SOME ACTIVE ACTIVITIES AND ADLS

## 2022-06-29 ASSESSMENT — PAIN DESCRIPTION - FREQUENCY: FREQUENCY: INTERMITTENT

## 2022-06-29 ASSESSMENT — PAIN DESCRIPTION - DESCRIPTORS: DESCRIPTORS: ACHING;THROBBING

## 2022-06-29 ASSESSMENT — PAIN DESCRIPTION - LOCATION: LOCATION: SHOULDER

## 2022-06-29 NOTE — PROGRESS NOTES
Physical Therapy  Initial Assessment  Date: 2022  Patient Name: Wing Zendejas  MRN: [id number]  : 1945    Referring Physician: FRANCES Vargas PA-C   PCP: Merlin Kil, MD     Medical Diagnosis: Pain in right shoulder [M25.511]  Other chronic pain [G89.29]  Pain in left shoulder [M25.512] M25.511, G89.29, M25.512 (ICD-10-CM) - Chronic pain of both shoulders  No data recorded    Insurance: Payor: Jakob Zhang / Plan: Mesha Seal / Product Type: *No Product type* /   Insurance ID: [id number] - (Medicare Managed)      Restrictions:       Subjective:   General  Chart Reviewed: Yes  Patient Assessed for Rehabilitation Services: Yes  History obtained from[de-identified] Chart Review,Patient  Family/Caregiver Present: No  Diagnosis: M25.511, G89.29, M25.512 (ICD-10-CM) - Chronic pain of both shoulders  Referring Provider (secondary): Rd Archuleta PA-C  Follows Commands: Within Functional Limits  PT Visit Information  PT Insurance Information: Primary: [facility] medicare, Secondary: Medicaid  Subjective  Subjective: Patient arrives to the clinic reporting bilateral shoulder pain. No OCTAVIO reported, reporting that she might have done some strenuous exercise with barbells. Patient reports that the pain started in the last couple of month. Denies any numbness or tingling.   Prior diagnostic testing[de-identified] X-ray (Multilevel degenerative changes)  Dominant Hand: : Right  Pain Screening  Patient Currently in Pain: Yes  Pain Assessment: 0-10  Pain Level: 0  Best Pain Level: 0  Worst Pain Level: 5  Pain Location: Shoulder  Pain Orientation: Right,Left  Pain Descriptors: Aching,Throbbing  Pain Frequency: Intermittent  Functional Pain Assessment: Prevents or interferes some active activities and ADLs  Aggravating factors: Lifting  Pain Management/Relieving Factors: Rest       Vision/Hearing:  Vision  Vision: Within Functional Limits  Hearing  Hearing: Within functional limits    Orientation:  Orientation  Overall Orientation Status: Within Normal Limits  Follows Commands: Within Functional Limits    Social History:  Social History  Lives With: Alone  Type of Home: House  Home Layout: One level  Home Access: Stairs to enter without rails  Entrance Stairs - Number of Steps: 2  Bathroom Shower/Tub: Tub/Shower unit    Functional Status:  Functional Status  Prior level of function: Independent  Occupation: Unemployed; Retired  Type of Occupation: Patient states that she has not been working in the past year, but states that she might go back to work  ADL Assistance: Independent  Homemaking Assistance: Independent  Homemaking Responsibilities: Yes  Ambulation Assistance: Independent  Transfer Assistance: Independent  Active : Yes  Mode of Transportation: Car    Objective:          AROM RUE (degrees)  RUE AROM : Exceptions  RUE General AROM: pain noted with abduction  R Shoulder Flexion 0-180: WNL  R Shoulder ABduction 0-180: WNL  R Shoulder Int Rotation  0-70: T10/11  R Shoulder Ext Rotation 0-90: T2/3  AROM LUE (degrees)  LUE AROM : Exceptions  LUE General AROM: pain noted with abduction  L Shoulder Flexion 0-180: WNL  L Shoulder ABduction 0-180: WNL  L Shoulder Int Rotation  0-70: T10/11  L Shoulder Ext Rotation  0-90: T2/3  Spine  Cervical: WNL. Movement did not create patient's pain.   Special Tests: - neer (B) , - drop arm (B), - espinal (B), + yergason (L) with mild pain noted, - Yergason (R)    Strength RUE  Strength RUE: Exception  R Shoulder Flexion: 4/5  R Shoulder Extension: 5/5  R Shoulder ABduction: 4-/5  R Shoulder Internal Rotation: 4+/5  R Shoulder External Rotation: 4+/5  R Elbow Flexion: 5/5  R Elbow Extension: 5/5  R Wrist Flexion: 5/5  R Wrist Extension: 5/5  Strength LUE  Strength LUE: Exception  L Shoulder Flexion: 4/5  L Shoulder Extension: 5/5  L Shoulder ABduction: 4-/5  L Shoulder Internal Rotation: 4+/5  L Shoulder External Rotation: 4+/5  L Elbow Flexion: shortness of breath 5/5  L Elbow Extension: 5/5  L Wrist Flexion: 5/5  L Wrist Extension: 5/5  Strength Other  Other: Lower trap, middle trap and rhomboid - grossly 4- to 4/5 bilaterally        Sensation  Overall Sensation Status: WNL        Assessment:    Conditions Requiring Skilled Therapeutic Intervention  Body Structures, Functions, Activity Limitations Requiring Skilled Therapeutic Intervention: Decreased ADL status; Decreased functional mobility ; Decreased strength;Decreased posture; Increased pain;Decreased ROM  Therapy Prognosis: Good  Referring Provider (secondary): Edita Neville PA-C  Activity Tolerance  Activity Tolerance: Patient tolerated treatment well  Activity Tolerance: Patient tolerated treatment well         Plan:    Plan  Plan weeks: 4 weeks  Current Treatment Recommendations: Strengthening,ROM,ADL/Self-care training,Functional mobility training,Manual Therapy - Soft Tissue Mobilization,Manual Therapy - Joint Manipulation,Pain management,Return to work related activity,Home exercise program,Safety education & training,Patient/Caregiver education & training,Modalities,Integrated dry needling,Therapeutic activities    OutComes Score:                 QuickDASH Total Score: 15 (06/29/22 1055)       QuickDASH Disability/Symptom Score : 9.09 % (06/29/22 1055)                           Goals:  Short Term Goals  Time Frame for Short term goals: 2 weeks  Short term goal 1: Initiate HEP  Long Term Goals  Time Frame for Long term goals : 4 weeks  Long term goal 1: Patient will report no greater than 1/10 bilateral shoulder pain to allow patient to get a full nights rest.  Long term goal 2: Patient will demonstrate full pain free bilateral shoulder AROM to allow patient to reach overhead with greater ease. Long term goal 3: Patient will demonstrate 5/5 bilateral shoulder strength to allow patient to carry items with greater ease.   Long term goal 4: Patient will score 0% disability on the Quick DASH to allow patient to complete her daily ADLs with greater ease. Long term goal 5: Patient will verbalize independence with her HEP for continued progress following therapy.        Therapy Time:   Individual Concurrent Group Co-treatment   Time In 1030         Time Out 1055         Minutes GABE Aguila

## 2022-06-29 NOTE — PROGRESS NOTES
Physical Therapy    Physical Therapy Daily Treatment Note    Date:  2022    Patient Name:  Neli Muñoz    :  1945  MRN: [id number]  Restrictions/Precautions:     Medical/Treatment Diagnosis Information:   · Diagnosis: M25.511, G89.29, M25.512 (ICD-10-CM) - Chronic pain of both shoulders  ·    Insurance/Certification information:  PT Insurance Information: Primary: [facility] medicare, Secondary: Medicaid  Physician Information:    Chika Scale, PA-C  Plan of care signed (Y/N):  N  Visit# / total visits:    Pain level: 0-5/10       Time In: 1030   Time Out: 1055    Progress Note: [x]  Yes  []  No  Next due by: Visit #10 or by 22    Subjective:   See eval     Objective: See eval   Observation:   Test measurements:      Exercises:   Exercise/Equipment Resistance/Repetitions Other comments   Cervical retraction     Scapular retraction          Prone cuff series     Wand AROM          TB rows/ext     TB 6 way shoulder            Doorway stretch      IR stretch                     [] Provided verbal/tactile cueing for activities related to strengthening, flexibility, endurance, ROM. (10995)  [] Provided verbal/tactile cueing for activities related to improving balance, coordination, kinesthetic sense, posture, motor skill, proprioception. (46062)    Therapeutic Activities:     [] Therapeutic activities, direct (one-on-one) patient contact (use of dynamic activities to improve functional performance). (17948)    Gait:   [] Provided training and instruction to the patient for ambulation re-education. (30614)    Self-Care/ADL's  [] Self-care/home management training and compensatory training, meal preparation, safety procedures, and instructions in use of assistive technology devices/adaptive equipment, direct one-on-one contact.  (70215)    Home Exercise Program:     [] Reviewed/Progressed HEP activities related to strengthening, flexibility, endurance, ROM. (35085)  [] Reviewed/Progressed HEP activities related to improving balance, coordination, kinesthetic sense, posture, motor skill, proprioception.  (56455)    Manual Treatments:    [] Provided manual therapy to mobilize soft tissue/joints for the purpose of modulating pain, promoting relaxation,  increasing ROM, reducing/eliminating soft tissue swelling/inflammation/restriction, improving soft tissue extensibility. (33185)    Service Based Modalities:   25' Eval    Timed Code Treatment Minutes: Total Treatment Minutes:   25'    Treatment/Activity Tolerance:  [x] Patient tolerated treatment well [] Patient limited by fatigue  [] Patient limited by pain  [] Patient limited by other medical complications  [] Other:     Prognosis: [x] Good [] Fair  [] Poor    Patient Requires Follow-up: [x] Yes  [] No      Goals:  Short Term Goals  Time Frame for Short term goals: 2 weeks  Short term goal 1: Initiate HEP    Long Term Goals  Time Frame for Long term goals : 4 weeks  Long term goal 1: Patient will report no greater than 1/10 bilateral shoulder pain to allow patient to get a full nights rest.  Long term goal 2: Patient will demonstrate full pain free bilateral shoulder AROM to allow patient to reach overhead with greater ease. Long term goal 3: Patient will demonstrate 5/5 bilateral shoulder strength to allow patient to carry items with greater ease. Long term goal 4: Patient will score 0% disability on the Quick DASH to allow patient to complete her daily ADLs with greater ease. Long term goal 5: Patient will verbalize independence with her HEP for continued progress following therapy.       Plan:   [] Continue per plan of care [] Alter current plan (see comments)  [x] Plan of care initiated [] Hold pending MD visit [] Discharge  Plan for Next Session:      Electronically signed by:  Cheryln Hodgkin, PT

## 2022-06-29 NOTE — PLAN OF CARE
[facility]    [x] [place]  Phone: [phone]  Fax: [phone]      [] [place]  Phone: [phone]  Fax: [phone]        To: Moose Mcclelland PA-C      Patient: Gavi Long  : 1945   MRN: [id number]  Evaluation Date: 2022      Diagnosis Information:  · Diagnosis: M25.511, G89.29, M25.512 (ICD-10-CM) - Chronic pain of both shoulders   ·       Physical Therapy Certification    Dear Moose Mcclelland PA-C  The following patient has been evaluated for physical therapy services and for therapy to continue, Medicare requires monthly physician review of the treatment plan. Please review the attached evaluation and/or summary of the patient's plan of care, and verify that you agree therapy should continue by signing the attached document and sending it back to our office. Plan of Care/Treatment to date:  [x] Therapeutic Exercise    [x] Modalities:  [x] Therapeutic Activity     [x] Ultrasound  [] Electrical Stimulation  [] Gait Training      [] Cervical Traction [] Lumbar Traction  [] Neuromuscular Re-education    [] Cold/hotpack [] Iontophoresis   [x] Instruction in HEP     Other:  [x] Manual Therapy      []             [] Aquatic Therapy      []           ? Goals:  Short Term Goals  Time Frame for Short term goals: 2 weeks  Short term goal 1: Initiate HEP    Long Term Goals  Time Frame for Long term goals : 4 weeks  Long term goal 1: Patient will report no greater than 1/10 bilateral shoulder pain to allow patient to get a full nights rest.  Long term goal 2: Patient will demonstrate full pain free bilateral shoulder AROM to allow patient to reach overhead with greater ease. Long term goal 3: Patient will demonstrate 5/5 bilateral shoulder strength to allow patient to carry items with greater ease.   Long term goal 4: Patient will score 0% disability on the Quick DASH to allow patient to complete her daily ADLs with greater ease.  Long term goal 5: Patient will verbalize independence with her HEP for continued progress following therapy. Frequency/Duration:  # Days per week: [] 1 day # Weeks: [] 1 week [] 5 weeks     [x] 2 days? [] 2 weeks [] 6 weeks     [] 3 days   [] 3 weeks [] 7 weeks     [] 4 days   [x] 4 weeks [] 8 weeks    Rehab Potential: [] Excellent [x] Good [] Fair  [] Poor     Electronically signed by:  Junie Cruz PT    If you have any questions or concerns, please don't hesitate to call.   Thank you for your referral.      Physician Signature:________________________________Date:__________________  By signing above, therapists plan is approved by physician

## 2022-07-05 ENCOUNTER — HOSPITAL ENCOUNTER (OUTPATIENT)
Dept: PHYSICAL THERAPY | Age: 77
Setting detail: THERAPIES SERIES
Discharge: HOME | End: 2022-07-05
Payer: MEDICARE

## 2022-07-05 PROCEDURE — 97110 THERAPEUTIC EXERCISES: CPT

## 2022-07-05 NOTE — PROGRESS NOTES
Physical Therapy    Physical Therapy Daily Treatment Note    Date:  2022    Patient Name:  Rui Ramirez    :  1945  MRN: [id number]  Restrictions/Precautions:     Medical/Treatment Diagnosis Information:   · Diagnosis: M25.511, G89.29, M25.512 (ICD-10-CM) - Chronic pain of both shoulders     Insurance/Certification information:  PT Insurance Information: Primary: [facility] medicare, Secondary: Medicaid  Physician Information:    Anthony Park PA-C  Plan of care signed (Y/N):  Y  Visit# / total visits:  2/8  Pain level: 0/10       Time In: 10:30   Time Out: 11:12    Progress Note: []  Yes  [x]  No  Next due by: Visit #10 or by 22    Subjective:  Pt notes no pain this date. No changes since eval.    Objective: ANGIE performed per flow sheet to increase strength and mobility for ease with daily tasks. Exercises initiated this date with good tolerance. Verbal cueing required throughout for proper technique. Pt notes increased discomfort at end of treatment. Pt encouraged to monitor response to PT session. Written HEP to be given next treatment. Observation:   Test measurements:      Exercises:   Exercise/Equipment Resistance/Repetitions Other comments   Cervical retraction 10x    Scapular retraction 10x         Prone cuff series 10x ea BUE   Wand AROM 10x         TB rows/ext 10x Red    TB 6 way shoulder  10x Red BUE        Doorway stretch  3x15\"    IR stretch  3x15\" BUE   Supine pec stretch 1'              [x] Provided verbal/tactile cueing for activities related to strengthening, flexibility, endurance, ROM. (68778)  [] Provided verbal/tactile cueing for activities related to improving balance, coordination, kinesthetic sense, posture, motor skill, proprioception. (59474)    Therapeutic Activities:     [] Therapeutic activities, direct (one-on-one) patient contact (use of dynamic activities to improve functional performance).  (79334)    Gait:   [] Provided training and instruction to the patient for ambulation re-education. (39138)    Self-Care/ADL's  [] Self-care/home management training and compensatory training, meal preparation, safety procedures, and instructions in use of assistive technology devices/adaptive equipment, direct one-on-one contact. (28673)    Home Exercise Program:     [] Reviewed/Progressed HEP activities related to strengthening, flexibility, endurance, ROM. (96300)  [] Reviewed/Progressed HEP activities related to improving balance, coordination, kinesthetic sense, posture, motor skill, proprioception.  (65382)    Manual Treatments:    [] Provided manual therapy to mobilize soft tissue/joints for the purpose of modulating pain, promoting relaxation,  increasing ROM, reducing/eliminating soft tissue swelling/inflammation/restriction, improving soft tissue extensibility. (53459)    Service Based Modalities:      Timed Code Treatment Minutes:   43' Therex    Total Treatment Minutes:   43'    Treatment/Activity Tolerance:  [x] Patient tolerated treatment well [] Patient limited by fatigue  [] Patient limited by pain  [] Patient limited by other medical complications  [] Other:     Prognosis: [x] Good [] Fair  [] Poor    Patient Requires Follow-up: [x] Yes  [] No      Goals:  Short Term Goals  Time Frame for Short term goals: 2 weeks  Short term goal 1: Initiate HEP    Long Term Goals  Time Frame for Long term goals : 4 weeks  Long term goal 1: Patient will report no greater than 1/10 bilateral shoulder pain to allow patient to get a full nights rest.  Long term goal 2: Patient will demonstrate full pain free bilateral shoulder AROM to allow patient to reach overhead with greater ease. Long term goal 3: Patient will demonstrate 5/5 bilateral shoulder strength to allow patient to carry items with greater ease. Long term goal 4: Patient will score 0% disability on the Quick DASH to allow patient to complete her daily ADLs with greater ease.   Long term goal 5: Patient will verbalize independence with her HEP for continued progress following therapy.       Plan:   [x] Continue per plan of care [] Alter current plan (see comments)  [] Plan of care initiated [] Hold pending MD visit [] Discharge  Plan for Next Session:  HEP, Progress per pt tolerance    Electronically signed by:  Shayy Bustillo PTA

## 2022-07-07 ENCOUNTER — HOSPITAL ENCOUNTER (OUTPATIENT)
Dept: PHYSICAL THERAPY | Age: 77
Setting detail: THERAPIES SERIES
Discharge: HOME | End: 2022-07-07
Payer: MEDICARE

## 2022-07-07 PROCEDURE — 97110 THERAPEUTIC EXERCISES: CPT

## 2022-07-11 ENCOUNTER — HOSPITAL ENCOUNTER (OUTPATIENT)
Dept: PHYSICAL THERAPY | Age: 77
Setting detail: THERAPIES SERIES
Discharge: HOME | End: 2022-07-11
Payer: MEDICARE
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
## 2022-07-13 ENCOUNTER — HOSPITAL ENCOUNTER (OUTPATIENT)
Dept: CT IMAGING | Age: 77
Discharge: HOME | End: 2022-07-15
Payer: MEDICARE

## 2022-07-13 ENCOUNTER — OFFICE VISIT (OUTPATIENT)
Dept: FAMILY MEDICINE CLINIC | Age: 77
End: 2022-07-13
Payer: MEDICARE

## 2022-07-13 ENCOUNTER — HOSPITAL ENCOUNTER (OUTPATIENT)
Dept: PHYSICAL THERAPY | Age: 77
Setting detail: THERAPIES SERIES
Discharge: HOME | End: 2022-07-13
Payer: MEDICARE

## 2022-07-13 VITALS
WEIGHT: 171.6 LBS | DIASTOLIC BLOOD PRESSURE: 60 MMHG | HEART RATE: 75 BPM | BODY MASS INDEX: 30.41 KG/M2 | OXYGEN SATURATION: 96 % | SYSTOLIC BLOOD PRESSURE: 124 MMHG | TEMPERATURE: 97.6 F | HEIGHT: 63 IN

## 2022-07-13 DIAGNOSIS — H53.8 BLURRY VISION: ICD-10-CM

## 2022-07-13 DIAGNOSIS — H53.2 DOUBLE VISION: ICD-10-CM

## 2022-07-13 DIAGNOSIS — H53.2 DOUBLE VISION: Primary | ICD-10-CM

## 2022-07-13 PROCEDURE — G8400 PT W/DXA NO RESULTS DOC: HCPCS | Performed by: FAMILY MEDICINE

## 2022-07-13 PROCEDURE — 1036F TOBACCO NON-USER: CPT | Performed by: FAMILY MEDICINE

## 2022-07-13 PROCEDURE — 1123F ACP DISCUSS/DSCN MKR DOCD: CPT | Performed by: FAMILY MEDICINE

## 2022-07-13 PROCEDURE — 99212 OFFICE O/P EST SF 10 MIN: CPT

## 2022-07-13 PROCEDURE — 1090F PRES/ABSN URINE INCON ASSESS: CPT | Performed by: FAMILY MEDICINE

## 2022-07-13 PROCEDURE — 97110 THERAPEUTIC EXERCISES: CPT

## 2022-07-13 PROCEDURE — G8427 DOCREV CUR MEDS BY ELIG CLIN: HCPCS | Performed by: FAMILY MEDICINE

## 2022-07-13 PROCEDURE — G8417 CALC BMI ABV UP PARAM F/U: HCPCS | Performed by: FAMILY MEDICINE

## 2022-07-13 PROCEDURE — 70450 CT HEAD/BRAIN W/O DYE: CPT

## 2022-07-13 PROCEDURE — 3017F COLORECTAL CA SCREEN DOC REV: CPT | Performed by: FAMILY MEDICINE

## 2022-07-13 PROCEDURE — 99213 OFFICE O/P EST LOW 20 MIN: CPT | Performed by: FAMILY MEDICINE

## 2022-07-13 NOTE — PROGRESS NOTES
Physical Therapy    Physical Therapy Daily Treatment Note    Date:  2022    Patient Name:  Bar Garcia    :  1945  MRN: [id number]  Restrictions/Precautions:     Medical/Treatment Diagnosis Information:   · Diagnosis: M25.511, G89.29, M25.512 (ICD-10-CM) - Chronic pain of both shoulders     Insurance/Certification information:  PT Insurance Information: Primary: [facility] medicare, Secondary: Medicaid  Physician Information:    Belen Rodriguez PA-C  Plan of care signed (Y/N):  Y  Visit# / total visits:  8  Pain level: 0/10        Time In: 11:20   Time Out: 11:47    Progress Note: []  Yes  [x]  No  Next due by: Visit #10 or by 22    Subjective:  Pt 15 minutes late to session. Patient compliant with HEP 1-2x daily. Objective: ANGIE performed per flow sheet to increase strength and mobility for ease with daily tasks. Verbal cueing required throughout for proper technique. Observation:  R UE more painful and demos more weakness vs L UE  Test measurements:       Exercises:   Exercise/Equipment Resistance/Repetitions Other comments   Cervical retraction 15x    Scapular retraction 15x         Prone cuff series 10x ea BUE   Wand AAROM 10x         TB rows/ext 15x Red    TB 6 way shoulder  10x Red BUE        Doorway stretch  3x20\"    IR stretch  10x BUE   Supine pec stretch 1'              [x] Provided verbal/tactile cueing for activities related to strengthening, flexibility, endurance, ROM. (95576)  [] Provided verbal/tactile cueing for activities related to improving balance, coordination, kinesthetic sense, posture, motor skill, proprioception. (30774)    Therapeutic Activities:     [] Therapeutic activities, direct (one-on-one) patient contact (use of dynamic activities to improve functional performance). (84310)    Gait:   [] Provided training and instruction to the patient for ambulation re-education.  (67459)    Self-Care/ADL's  [] Self-care/home management training and compensatory training, meal preparation, safety procedures, and instructions in use of assistive technology devices/adaptive equipment, direct one-on-one contact. (51361)    Home Exercise Program:     [] Reviewed/Progressed HEP activities related to strengthening, flexibility, endurance, ROM. (73000)  [] Reviewed/Progressed HEP activities related to improving balance, coordination, kinesthetic sense, posture, motor skill, proprioception.  (92516)    Manual Treatments:    [] Provided manual therapy to mobilize soft tissue/joints for the purpose of modulating pain, promoting relaxation,  increasing ROM, reducing/eliminating soft tissue swelling/inflammation/restriction, improving soft tissue extensibility. (56405)    Service Based Modalities:      Timed Code Treatment Minutes:   32' Therex/HEP    Total Treatment Minutes:   27'    Treatment/Activity Tolerance:  [x] Patient tolerated treatment well [] Patient limited by fatigue  [] Patient limited by pain  [] Patient limited by other medical complications  [] Other:     Prognosis: [x] Good [] Fair  [] Poor    Patient Requires Follow-up: [x] Yes  [] No      Goals:  Short Term Goals  Time Frame for Short term goals: 2 weeks  Short term goal 1: Initiate HEP    Long Term Goals  Time Frame for Long term goals : 4 weeks  Long term goal 1: Patient will report no greater than 1/10 bilateral shoulder pain to allow patient to get a full nights rest.  Long term goal 2: Patient will demonstrate full pain free bilateral shoulder AROM to allow patient to reach overhead with greater ease. Long term goal 3: Patient will demonstrate 5/5 bilateral shoulder strength to allow patient to carry items with greater ease. Long term goal 4: Patient will score 0% disability on the Quick DASH to allow patient to complete her daily ADLs with greater ease. Long term goal 5: Patient will verbalize independence with her HEP for continued progress following therapy.       Plan:   [x] Continue per plan of care [] Alter current plan (see comments)  [] Plan of care initiated [] Hold pending MD visit [] Discharge    Plan for Next Session:  HEP, Progress per pt tolerance    Electronically signed by:  Omar Real PTA

## 2022-07-13 NOTE — PROGRESS NOTES
[address]                        Telephone [phone]             Fax [phone]       Santa Guzman  :  1945  Age:  78 y.o. MRN:  [id number]  Date of visit:  2022       Assessment and Plan:    1. Double vision  2. Blurry vision  I reviewed the results of the CT done today with the patient. I have recommended follow up with ophthalmology. A referral was ordered:  - Ambulatory referral to Ophthalmology            Subjective:    Santa Guzman is a 78 y.o. female who presents to [facility] today (2022) for follow up/evaluation of: Other (Patient reports that she has issues with seeing at night when driving.)      She reports vision issues since she had cataract surgery in . She had cataract surgery on the left eye in 2020, and on the right eye in 2020. She states that she has had issues with blurred vision and double vision since the surgeries. She has increased difficulty at night. She states that she has difficulty focusing on the lines on the pavement at night. She had an eye exam with Dr. Sekou Gamez on 6/15/2022 that did not show any significant abnormalities. She was pulled over when driving at night approximately a week ago. She states that after speaking with the , he followed her home. She has received four doses of the Pfizer Covid-19 vaccine.          Immunization history was reviewed:  Immunization History   Administered Date(s) Administered    COVID-19, PFIZER GRAY top, DO NOT Dilute, (age 15 y+), IM, 30 mcg/0.3 mL 2022    COVID-19, PFIZER PURPLE top, DILUTE for use, (age 15 y+), 30mcg/0.3mL 2021, 2021, 2021    Influenza Vaccine, unspecified formulation 10/02/2017    Influenza, High Dose (Fluzone 65 yrs and older) 2018, 2019    Influenza, Areli Vann adjuvanted, 65 yrs +, IM, PF (Fluad) 10/05/2020, 12/08/2021    PPD Test 04/30/2021, 12/08/2021, 06/14/2022    Pneumococcal Conjugate 13-valent ([id number]) 01/29/2019    Pneumococcal Polysaccharide ([id number]) 07/12/2004, 09/18/2007, 08/22/2013    Td, unspecified formulation 05/04/2018    Tdap (Boostrix, Adacel) 09/03/2012, 11/28/2018    Zoster Recombinant (Shingrix) 03/01/2019, 05/15/2019          She has the following problem list:  Patient Active Problem List   Diagnosis    Vitamin D deficiency    Overweight (BMI 25.0-29. 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    Dermatochalasis of both upper eyelids    Combined forms of age-related cataract of left eye    Cataract extraction status of eye, left    Aftercare following surgery of a sensory organ    ADHD (attention deficit hyperactivity disorder)        Current medications are:  Outpatient Medications Marked as Taking for the 7/13/22 encounter (Office Visit) with Naomy Mitchell MD   Medication Sig Dispense Refill    ibandronate (BONIVA) 150 MG tablet Take 1 tablet by mouth every 30 days Take one (1) tablet once per month in the morning with a full glass of water, on an empty stomach, and do not take anything else by mouth or lie down for the next 60 minutes. 3 tablet 3    blood glucose test strips (ACCU-CHEK IRVIN PLUS) strip TEST BLOOD SUGAR ONCE DAILY AND AS NEEDED FOR SYMPTOMS OF IRREGULAR BLOOD SUGAR 100 strip 1    blood glucose monitor kit and supplies Dispense sufficient amount for indicated testing frequency plus additional to accommodate PRN testing needs. Dispense all needed supplies to include: monitor, strips, lancing device, lancets, control solutions, alcohol swabs. Testing 1 x daily 1 kit 0    tretinoin (RETIN-A) 0.1 % cream Apply topically nightly.  45 g 0    Accu-Chek Softclix Lancets MISC Use to check blood glucose daily or as needed for hypo/hyperglycemia 100 each 2    Blood Glucose Monitoring Suppl (ACCU-CHEK IRVIN PLUS) w/Device KIT USE TO TEST BLOOD SUGAR DAILY AND AS NEEDED FOR SYMPTOMS OF IRREGULAR BLOOD SUGAR 1 kit 0    amphetamine-dextroamphetamine (ADDERALL XR) 10 MG extended release capsule Take 1 capsule by mouth daily for 30 days. 30 capsule 0    Cholecalciferol (VITAMIN D3) 5000 UNITS TABS Take 5,000 Units by mouth daily      co-enzyme Q-10 50 MG capsule Take 50 mg by mouth daily      magnesium oxide (MAG-OX) 400 MG tablet Take 500 mg by mouth daily       Omega-3 300 MG CAPS Take 300 mg by mouth daily         She is allergic to asa [aspirin], celexa [citalopram hydrobromide], elemental sulfur, penicillins, sulfa antibiotics, and wasp venom. She  reports that she has never smoked. She has never used smokeless tobacco.      Objective:    Vitals:    07/13/22 1520   BP: 124/60   Site: Right Upper Arm   Position: Sitting   Cuff Size: Large Adult   Pulse: 75   Temp: 97.6 °F (36.4 °C)   TempSrc: Temporal   SpO2: 96%   Weight: 171 lb 9.6 oz (77.8 kg)   Height: 5' 2.5\" (1.588 m)     Body mass index is 30.89 kg/m². Obese female, healthy-appearing, alert, cooperative and in no acute distress. Pupils are equal, round, and reactive to light. Cranial nerves II-XII are intact. Neck supple. No adenopathy. Thyroid symmetric, normal size. Chest:  Normal expansion. Clear to auscultation. No rales, rhonchi, or wheezing. Heart sounds are normal.  Regular rate and rhythm without murmur, gallop or rub. Speech is clear. She responds to questions appropriately.       Results of the CT done today was reviewed with the patient:   CT HEAD WO CONTRAST    Result Date: 7/13/2022  EXAMINATION: CT OF THE HEAD WITHOUT CONTRAST  7/13/2022 4:36 pm TECHNIQUE: CT of the head was performed without the administration of intravenous contrast. Automated exposure control, iterative reconstruction, and/or weight based adjustment of the mA/kV was utilized to reduce the radiation dose to as low as reasonably achievable. COMPARISON: None. HISTORY: ORDERING SYSTEM PROVIDED HISTORY: Double vision TECHNOLOGIST PROVIDED HISTORY: episodes of double vision and blurry vision Reason for Exam: For a couple of years patient has had off and on double vision, she says it is getting worse FINDINGS: BRAIN/VENTRICLES: There is no acute intracranial hemorrhage, mass effect or midline shift. No abnormal extra-axial fluid collection. The gray-white differentiation is maintained without evidence of an acute infarct. There is no evidence of hydrocephalus. Mild involutional changes and chronic small vessel ischemic disease. Vascular calcifications. ORBITS: Cataract surgery. SINUSES: The visualized paranasal sinuses and mastoid air cells demonstrate no acute abnormality. SOFT TISSUES/SKULL:  No acute abnormality of the visualized skull or soft tissues. No acute intracranial abnormality.  Chronic microvascular disease            (Please note that portions of this note were completed with a voice-recognition program. Efforts were made to edit the dictation but occasionally words are mis-transcribed.)

## 2022-07-13 NOTE — PROGRESS NOTES
Patient reports that she was pulled over last weekend during the night and states that she feels that the middle line for the johanny looks like it is moving. She was to Dr. Liz Yepez a few weeks without any abnormalities. Has had cataract removal with 2 different lens placed in each eye. Reports that vision is worse when she is tired. Patient reports that it occurs during the day at times, but not as often.

## 2022-07-15 ENCOUNTER — HOSPITAL ENCOUNTER (OUTPATIENT)
Dept: PHYSICAL THERAPY | Age: 77
Setting detail: THERAPIES SERIES
Discharge: HOME | End: 2022-07-15
Payer: MEDICARE

## 2022-07-15 PROCEDURE — 97110 THERAPEUTIC EXERCISES: CPT

## 2022-07-15 NOTE — PROGRESS NOTES
Physical Therapy    Physical Therapy Daily Treatment Note    Date:  7/15/2022    Patient Name:  Chico De La Torre    :  1945  MRN: [id number]  Restrictions/Precautions:     Medical/Treatment Diagnosis Information:   Diagnosis: M25.511, G89.29, M25.512 (ICD-10-CM) - Chronic pain of both shoulders     Insurance/Certification information:  PT Insurance Information: Primary: [facility] medicare, Secondary: Medicaid  Physician Information:    Adelso Grier PA-C  Plan of care signed (Y/N):  Y  Visit# / total visits:    Pain level: 0/10        Time In: 11:10   Time Out: 11:49    Progress Note: []  Yes  [x]  No  Next due by: Visit #10 or by 22    Subjective:  Patient compliant with HEP 1-2x daily. Objective: ANGIE performed per flow sheet to increase strength and mobility for ease with daily tasks. Verbal cueing required throughout for proper technique. Observation:  R UE more painful and demos more weakness vs L UE  Test measurements:       Exercises:   Exercise/Equipment Resistance/Repetitions Other comments   Cervical retraction 15x    Cervical retraction extension 15x    Scapular retraction 15x    Finger ladder 10x abd   Prone cuff series 10x ea BUE   Wand AAROM 10x         TB rows/ext 15x Red    TB 6 way shoulder  10x Red BUE        Doorway stretch  3x20\"    IR stretch  10x BUE   Supine pec stretch 1'              [x] Provided verbal/tactile cueing for activities related to strengthening, flexibility, endurance, ROM. (01266)  [] Provided verbal/tactile cueing for activities related to improving balance, coordination, kinesthetic sense, posture, motor skill, proprioception. (96024)    Therapeutic Activities:     [] Therapeutic activities, direct (one-on-one) patient contact (use of dynamic activities to improve functional performance). (11101)    Gait:   [] Provided training and instruction to the patient for ambulation re-education.  (14558)    Self-Care/ADL's  [] Self-care/home management training and compensatory training, meal preparation, safety procedures, and instructions in use of assistive technology devices/adaptive equipment, direct one-on-one contact. (62840)    Home Exercise Program:     [] Reviewed/Progressed HEP activities related to strengthening, flexibility, endurance, ROM. (32555)  [] Reviewed/Progressed HEP activities related to improving balance, coordination, kinesthetic sense, posture, motor skill, proprioception.  (04936)    Manual Treatments:    [] Provided manual therapy to mobilize soft tissue/joints for the purpose of modulating pain, promoting relaxation,  increasing ROM, reducing/eliminating soft tissue swelling/inflammation/restriction, improving soft tissue extensibility. (95198)    Service Based Modalities:      Timed Code Treatment Minutes:   44' Therex/HEP    Total Treatment Minutes:   44'    Treatment/Activity Tolerance:  [x] Patient tolerated treatment well [] Patient limited by fatigue  [] Patient limited by pain  [] Patient limited by other medical complications  [] Other:     Prognosis: [x] Good [] Fair  [] Poor    Patient Requires Follow-up: [x] Yes  [] No      Goals:  Short Term Goals  Time Frame for Short term goals: 2 weeks  Short term goal 1: Initiate HEP (compliant)    Long Term Goals  Time Frame for Long term goals : 4 weeks  Long term goal 1: Patient will report no greater than 1/10 bilateral shoulder pain to allow patient to get a full nights rest. (Notes no pain, no lost sleep)  Long term goal 2: Patient will demonstrate full pain free bilateral shoulder AROM to allow patient to reach overhead with greater ease. Long term goal 3: Patient will demonstrate 5/5 bilateral shoulder strength to allow patient to carry items with greater ease. Long term goal 4: Patient will score 0% disability on the Quick DASH to allow patient to complete her daily ADLs with greater ease.   Long term goal 5: Patient will verbalize independence with her HEP for continued progress following therapy.       Plan:   [x] Continue per plan of care [] Alter current plan (see comments)  [] Plan of care initiated [] Hold pending MD visit [] Discharge    Plan for Next Session:  HEP, Progress per pt tolerance    Electronically signed by:  Antelmo Clark PTA

## 2022-07-18 ENCOUNTER — HOSPITAL ENCOUNTER (OUTPATIENT)
Dept: PHYSICAL THERAPY | Age: 77
Setting detail: THERAPIES SERIES
Discharge: HOME | End: 2022-07-18
Payer: MEDICARE

## 2022-07-18 PROCEDURE — 97110 THERAPEUTIC EXERCISES: CPT

## 2022-07-18 NOTE — PROGRESS NOTES
(64168)    Self-Care/ADL's  [] Self-care/home management training and compensatory training, meal preparation, safety procedures, and instructions in use of assistive technology devices/adaptive equipment, direct one-on-one contact. (61326)    Home Exercise Program:     [] Reviewed/Progressed HEP activities related to strengthening, flexibility, endurance, ROM. (95209)  [] Reviewed/Progressed HEP activities related to improving balance, coordination, kinesthetic sense, posture, motor skill, proprioception.  (04649)    Manual Treatments:    [] Provided manual therapy to mobilize soft tissue/joints for the purpose of modulating pain, promoting relaxation,  increasing ROM, reducing/eliminating soft tissue swelling/inflammation/restriction, improving soft tissue extensibility. (94886)    Service Based Modalities:      Timed Code Treatment Minutes:   27' Therex/HEP    Total Treatment Minutes:   30'    Treatment/Activity Tolerance:  [x] Patient tolerated treatment well [] Patient limited by fatigue  [] Patient limited by pain  [] Patient limited by other medical complications  [] Other:     Prognosis: [x] Good [] Fair  [] Poor    Patient Requires Follow-up: [x] Yes  [] No      Goals:  Short Term Goals  Time Frame for Short term goals: 2 weeks  Short term goal 1: Initiate HEP (compliant)    Long Term Goals  Time Frame for Long term goals : 4 weeks  Long term goal 1: Patient will report no greater than 1/10 bilateral shoulder pain to allow patient to get a full nights rest. (Notes no pain, no lost sleep)  Long term goal 2: Patient will demonstrate full pain free bilateral shoulder AROM to allow patient to reach overhead with greater ease. Long term goal 3: Patient will demonstrate 5/5 bilateral shoulder strength to allow patient to carry items with greater ease. Long term goal 4: Patient will score 0% disability on the Quick DASH to allow patient to complete her daily ADLs with greater ease.   Long term goal 5: Patient will verbalize independence with her HEP for continued progress following therapy.       Plan:   [x] Continue per plan of care [] Alter current plan (see comments)  [] Plan of care initiated [] Hold pending MD visit [] Discharge    Plan for Next Session:  HEP, Progress per pt tolerance    Electronically signed by:  Mary Bolden PTA

## 2022-07-20 ENCOUNTER — HOSPITAL ENCOUNTER (OUTPATIENT)
Dept: PHYSICAL THERAPY | Age: 77
Setting detail: THERAPIES SERIES
Discharge: HOME | End: 2022-07-20
Payer: MEDICARE

## 2022-07-20 PROCEDURE — 97110 THERAPEUTIC EXERCISES: CPT

## 2022-07-20 NOTE — PROGRESS NOTES
Physical Therapy    Physical Therapy Daily Treatment Note    Date:  2022    Patient Name:  Kyra Maria    :  1945  MRN: [id number]  Restrictions/Precautions:     Medical/Treatment Diagnosis Information:   Diagnosis: M25.511, G89.29, M25.512 (ICD-10-CM) - Chronic pain of both shoulders     Insurance/Certification information:  PT Insurance Information: Primary: [facility] medicare, Secondary: Medicaid  Physician Information:    Nigel Ng PA-C  Plan of care signed (Y/N):  Y  Visit# / total visits:    Pain level: 0/10        Time In: 9:40  Time Out: 10;16    Progress Note: []  Yes  [x]  No  Next due by: Visit #10 or by 22    Subjective:  Patient 10 mins late to session. Discussed tardiness reducing progression. Patient states she still has pain while performing abduction but has been improving. Discussed whether patient would benefit from more sessions with patient having to decide and let PT know next session. Objective: ANGIE performed per flow sheet to increase strength and mobility for ease with daily tasks. Verbal cueing required throughout for proper technique. Added weights this date to address muscle weakness with good challenge noted.      Observation:  R UE more painful and demos more weakness vs L UE  Test measurements:   R flex: 4/5, L flex 5/5, R abd 4-/5, L abd 5/5    Exercises:   Exercise/Equipment Resistance/Repetitions Other comments   Cervical retraction 20x    Cervical retraction extension 20x    Scapular retraction 20x    Finger ladder abd   Prone cuff series 10x ea, 3# BUE   Wand AAROM 10x Stretching with holds        TB rows/ext 15x GREEN    TB 6 way shoulder  10x GREEN BUE        Doorway stretch     IR stretch  BUE   Supine pec stretch 1'    Flex/abd 15x, 3# R, L 4#         [x] Provided verbal/tactile cueing for activities related to strengthening, flexibility, endurance, ROM. (43210)  [] Provided verbal/tactile cueing for activities related to improving balance, coordination, kinesthetic sense, posture, motor skill, proprioception. (03729)    Therapeutic Activities:     [] Therapeutic activities, direct (one-on-one) patient contact (use of dynamic activities to improve functional performance). (32156)    Gait:   [] Provided training and instruction to the patient for ambulation re-education. (09882)    Self-Care/ADL's  [] Self-care/home management training and compensatory training, meal preparation, safety procedures, and instructions in use of assistive technology devices/adaptive equipment, direct one-on-one contact. (84739)    Home Exercise Program:     [] Reviewed/Progressed HEP activities related to strengthening, flexibility, endurance, ROM. (49323)  [] Reviewed/Progressed HEP activities related to improving balance, coordination, kinesthetic sense, posture, motor skill, proprioception.  (74123)    Manual Treatments:    [] Provided manual therapy to mobilize soft tissue/joints for the purpose of modulating pain, promoting relaxation,  increasing ROM, reducing/eliminating soft tissue swelling/inflammation/restriction, improving soft tissue extensibility.  (34601)    Service Based Modalities:      Timed Code Treatment Minutes:   39' Therex/HEP    Total Treatment Minutes:   39'    Treatment/Activity Tolerance:  [x] Patient tolerated treatment well [] Patient limited by fatigue  [] Patient limited by pain  [] Patient limited by other medical complications  [] Other:     Prognosis: [x] Good [] Fair  [] Poor    Patient Requires Follow-up: [x] Yes  [] No      Goals:  Short Term Goals  Time Frame for Short term goals: 2 weeks  Short term goal 1: Initiate HEP (compliant)    Long Term Goals  Time Frame for Long term goals : 4 weeks  Long term goal 1: Patient will report no greater than 1/10 bilateral shoulder pain to allow patient to get a full nights rest. (Notes no pain, no lost sleep)  Long term goal 2: Patient will demonstrate full pain free bilateral shoulder AROM to allow

## 2022-07-25 ENCOUNTER — HOSPITAL ENCOUNTER (OUTPATIENT)
Dept: PHYSICAL THERAPY | Age: 77
Setting detail: THERAPIES SERIES
Discharge: HOME | End: 2022-07-25
Payer: MEDICARE

## 2022-07-25 PROCEDURE — 97110 THERAPEUTIC EXERCISES: CPT

## 2022-07-25 NOTE — DISCHARGE SUMMARY
[facility]    [x] [place]  Phone: [phone]  Fax: [phone]      [] [place]  Phone: [phone]  Fax: [phone]    Physical Therapy Discharge Note  Date: 2022        Patient Name:  Aliza Easton    :  1945  MRN: [id number]  Restrictions/Precautions:     Medical/Treatment Diagnosis Information:   Diagnosis: M25.511, G89.29, M25.512 (ICD-10-CM) - Chronic pain of both shoulders    Insurance/Certification information:  PT Insurance Information: Primary: [facility] medicare, Secondary: Medicaid  Physician Information:    Kiara Blake PA-C  Plan of care signed (Y/N):  Y  Visit# / total visits:    Pain level:      0/10          Time Period for Report: 22 - 22  Cancels/No-shows to date:      Plan of Care/Treatment to date:  [x] Therapeutic Exercise    [] Modalities:  [] Therapeutic Activity     [] Ultrasound  [] Electrical Stimulation  [] Gait Training      [] Cervical Traction    [] Lumbar Traction  [] Neuromuscular Re-education  [] Cold/hotpack [] Iontophoresis  [x] Instruction in HEP      Other:  [] Manual Therapy       []    [] Aquatic Therapy       []                              Subjective:  Patient reports 0/10 bilateral shoulder pain upon arrival. Patient reports that in the past couple of days she has had 2/10 bilateral shoulder pain when performing abduction. Patient states that she did not take her prednisone. Patient states that the shoulder pain does not wake her up in the middle of the night, but reports that she just doesn't sleep well. Discussed with patient d/c from therapy services and to continue with strong HEP at home. Encouraged patient to follow up with orthopedic physician to discuss next steps. Objective: Provided patient with updated written HEP of exercises that have been completed in therapy. Reviewed exercises during session. Patient verbalized feeling comfortable completing exercises. Observation:  Test measurements:       B flexion - 180 degrees, B abduction - 180 degree, B ER - T2/3, B IR - T10/11   - patient states that she does not have pain when performing shoulder abduction AROM, but notes occasional pain when statically holding shoulder abduction position     R flex: 4+/5, abduction - 4/5, IR - 4+/5, ER - 4+/5  L flex: 5/5. Abduction - 5/5, IR - 5/5, ER - 4+/5     Patient scored 6.82% disability on the Quick Dash this date (7/25/22) compared to a score of 9.09% at evaluation (6/29/22). Assessment: Noted improvements in patient's self reported pain level, bilateral shoulder ROM and strength, as well as functional ability compared to at evaluation. Plan: D/c from therapy services, patient to continue with HEP at home and encouraged patient to follow up with orthopedic physician to discuss plan. Goals:     Short Term Goals  Time Frame for Short term goals: 2 weeks  Short term goal 1: Initiate HEP - met (compliant)     Long Term Goals  Time Frame for Long term goals : 4 weeks  Long term goal 1: Patient will report no greater than 1/10 bilateral shoulder pain to allow patient to get a full nights rest. - met  Long term goal 2: Patient will demonstrate full pain free bilateral shoulder AROM to allow patient to reach overhead with greater ease. - met  Long term goal 3: Patient will demonstrate 5/5 bilateral shoulder strength to allow patient to carry items with greater ease. - partially met  Long term goal 4: Patient will score 0% disability on the Quick DASH to allow patient to complete her daily ADLs with greater ease. - partially met  Long term goal 5: Patient will verbalize independence with her HEP for continued progress following therapy.  - met (Notes compliance       Percentage of Goals Met: 66%            Discharge Prognosis: [] Excellent [x] Good [] Fair  [] Poor     Goal Status:  [] Achieved [x] Partially Achieved  [] Not Achieved       Electronically signed by:  Cintia Khan, PT

## 2022-07-25 NOTE — PROGRESS NOTES
Physical Therapy    Physical Therapy Daily Treatment Note    Date:  2022    Patient Name:  Santa Guzman    :  1945  MRN: [id number]  Restrictions/Precautions:     Medical/Treatment Diagnosis Information:   Diagnosis: M25.511, G89.29, M25.512 (ICD-10-CM) - Chronic pain of both shoulders     Insurance/Certification information:  PT Insurance Information: Primary: [facility] medicare, Secondary: Medicaid  Physician Information:    Martha Martinez PA-C  Plan of care signed (Y/N):  Y  Visit# / total visits:    Pain level: /10        Time In: 1030   Time Out: 1115    Progress Note: []  Yes  [x]  No  Next due by: Visit #10 or by 22    Subjective:  Patient reports 0/10 bilateral shoulder pain upon arrival. Patient reports that in the past couple of days she has had 2/10 bilateral shoulder pain when performing abduction. Patient states that she did not take her prednisone. Patient states that the shoulder pain does not wake her up in the middle of the night, but reports that she just doesn't sleep well. Discussed with patient d/c from therapy services and to continue with strong HEP at home. Encouraged patient to follow up with orthopedic physician to discuss next steps. Objective: Provided patient with updated written HEP of exercises that have been completed in therapy. Reviewed exercises during session. Patient verbalized feeling comfortable completing exercises. Observation:   Test measurements:       B flexion - 180 degrees, B abduction - 180 degree, B ER - T2/3, B IR - T10/11   - patient states that she does not have pain when performing shoulder abduction AROM, but notes occasional pain when statically holding shoulder abduction position    R flex: 4+/5, abduction - 4/5, IR - 4+/5, ER - 4+/5  L flex: 5/5. Abduction - 5/5, IR - 5/5, ER - 4+/5    Patient scored 6.82% disability on the Quick Dash this date (22) compared to a score of 9.09% at evaluation (22).      Exercises: pain  [] Patient limited by other medical complications  [] Other:     Prognosis: [x] Good [] Fair  [] Poor    Patient Requires Follow-up: [x] Yes  [] No      Goals:  Short Term Goals  Time Frame for Short term goals: 2 weeks  Short term goal 1: Initiate HEP - met (compliant)    Long Term Goals  Time Frame for Long term goals : 4 weeks  Long term goal 1: Patient will report no greater than 1/10 bilateral shoulder pain to allow patient to get a full nights rest. - met  Long term goal 2: Patient will demonstrate full pain free bilateral shoulder AROM to allow patient to reach overhead with greater ease. - met  Long term goal 3: Patient will demonstrate 5/5 bilateral shoulder strength to allow patient to carry items with greater ease. - partially met  Long term goal 4: Patient will score 0% disability on the Quick DASH to allow patient to complete her daily ADLs with greater ease. - partially met  Long term goal 5: Patient will verbalize independence with her HEP for continued progress following therapy. - met (Notes compliance)      Plan:   [] Continue per plan of care [] Alter current plan (see comments)  [] Plan of care initiated [] Hold pending MD visit [x] Discharge    Plan for Next Session:  D/c from therapy services, patient to continue with HEP at home and encouraged patient to follow up with orthopedic physician to discuss plan.      Electronically signed by:  Emilie Burrows PT

## 2022-08-08 ENCOUNTER — HOSPITAL ENCOUNTER (OUTPATIENT)
Dept: LAB | Age: 77
Discharge: HOME | End: 2022-08-08
Payer: MEDICARE

## 2022-08-08 DIAGNOSIS — R73.09 ELEVATED GLUCOSE: ICD-10-CM

## 2022-08-08 DIAGNOSIS — M85.80 OSTEOPENIA, UNSPECIFIED LOCATION: ICD-10-CM

## 2022-08-08 DIAGNOSIS — E03.9 HYPOTHYROIDISM, UNSPECIFIED TYPE: ICD-10-CM

## 2022-08-08 DIAGNOSIS — E66.8 OTHER OBESITY: ICD-10-CM

## 2022-08-08 LAB
ALBUMIN SERPL-MCNC: 3.8 G/DL (ref 3.5–5.2)
ALBUMIN/GLOBULIN RATIO: 1.2 (ref 1–2.5)
ALP BLD-CCNC: 78 U/L (ref 35–104)
ALT SERPL-CCNC: 13 U/L (ref 5–33)
ANION GAP SERPL CALCULATED.3IONS-SCNC: 8 MMOL/L (ref 9–17)
AST SERPL-CCNC: 18 U/L
BILIRUB SERPL-MCNC: 0.29 MG/DL (ref 0.3–1.2)
BUN BLDV-MCNC: 20 MG/DL (ref 8–23)
BUN/CREAT BLD: 26 (ref 9–20)
CALCIUM SERPL-MCNC: 9.1 MG/DL (ref 8.6–10.4)
CHLORIDE BLD-SCNC: 102 MMOL/L (ref 98–107)
CHOLESTEROL/HDL RATIO: 2
CHOLESTEROL: 99 MG/DL
CO2: 29 MMOL/L (ref 20–31)
CREAT SERPL-MCNC: 0.77 MG/DL (ref 0.5–0.9)
ESTIMATED AVERAGE GLUCOSE: 105 MG/DL
GFR AFRICAN AMERICAN: >60 ML/MIN
GFR NON-AFRICAN AMERICAN: >60 ML/MIN
GFR SERPL CREATININE-BSD FRML MDRD: ABNORMAL ML/MIN/{1.73_M2}
GLUCOSE BLD-MCNC: 86 MG/DL (ref 70–99)
HBA1C MFR BLD: 5.3 % (ref 4–6)
HDLC SERPL-MCNC: 49 MG/DL
LDL CHOLESTEROL: 42 MG/DL (ref 0–130)
POTASSIUM SERPL-SCNC: 4.3 MMOL/L (ref 3.7–5.3)
SODIUM BLD-SCNC: 139 MMOL/L (ref 135–144)
TOTAL PROTEIN: 7.1 G/DL (ref 6.4–8.3)
TRIGL SERPL-MCNC: 38 MG/DL
TSH SERPL DL<=0.05 MIU/L-ACNC: 2.73 UIU/ML (ref 0.3–5)
VITAMIN D 25-HYDROXY: 31.4 NG/ML

## 2022-08-08 PROCEDURE — 84443 ASSAY THYROID STIM HORMONE: CPT

## 2022-08-08 PROCEDURE — 80061 LIPID PANEL: CPT

## 2022-08-08 PROCEDURE — 36415 COLL VENOUS BLD VENIPUNCTURE: CPT

## 2022-08-08 PROCEDURE — 83036 HEMOGLOBIN GLYCOSYLATED A1C: CPT

## 2022-08-08 PROCEDURE — 80053 COMPREHEN METABOLIC PANEL: CPT

## 2022-08-08 PROCEDURE — 82306 VITAMIN D 25 HYDROXY: CPT

## 2022-08-09 ENCOUNTER — OFFICE VISIT (OUTPATIENT)
Dept: FAMILY MEDICINE CLINIC | Age: 77
End: 2022-08-09
Payer: MEDICARE

## 2022-08-09 VITALS
HEART RATE: 71 BPM | TEMPERATURE: 98.2 F | WEIGHT: 174.2 LBS | BODY MASS INDEX: 30.87 KG/M2 | HEIGHT: 63 IN | DIASTOLIC BLOOD PRESSURE: 60 MMHG | OXYGEN SATURATION: 98 % | SYSTOLIC BLOOD PRESSURE: 110 MMHG

## 2022-08-09 DIAGNOSIS — L70.0 ACNE VULGARIS: ICD-10-CM

## 2022-08-09 DIAGNOSIS — E03.9 HYPOTHYROIDISM, UNSPECIFIED TYPE: ICD-10-CM

## 2022-08-09 DIAGNOSIS — E55.9 VITAMIN D DEFICIENCY: ICD-10-CM

## 2022-08-09 DIAGNOSIS — R73.09 ELEVATED GLUCOSE: Primary | ICD-10-CM

## 2022-08-09 PROCEDURE — 3017F COLORECTAL CA SCREEN DOC REV: CPT | Performed by: FAMILY MEDICINE

## 2022-08-09 PROCEDURE — G8427 DOCREV CUR MEDS BY ELIG CLIN: HCPCS | Performed by: FAMILY MEDICINE

## 2022-08-09 PROCEDURE — 1090F PRES/ABSN URINE INCON ASSESS: CPT | Performed by: FAMILY MEDICINE

## 2022-08-09 PROCEDURE — G8400 PT W/DXA NO RESULTS DOC: HCPCS | Performed by: FAMILY MEDICINE

## 2022-08-09 PROCEDURE — G8417 CALC BMI ABV UP PARAM F/U: HCPCS | Performed by: FAMILY MEDICINE

## 2022-08-09 PROCEDURE — 1036F TOBACCO NON-USER: CPT | Performed by: FAMILY MEDICINE

## 2022-08-09 PROCEDURE — 99213 OFFICE O/P EST LOW 20 MIN: CPT | Performed by: FAMILY MEDICINE

## 2022-08-09 PROCEDURE — 1123F ACP DISCUSS/DSCN MKR DOCD: CPT | Performed by: FAMILY MEDICINE

## 2022-08-09 PROCEDURE — 99212 OFFICE O/P EST SF 10 MIN: CPT

## 2022-08-09 RX ORDER — BLOOD-GLUCOSE METER
1 KIT MISCELLANEOUS DAILY
Qty: 1 KIT | Refills: 0 | Status: SHIPPED | OUTPATIENT
Start: 2022-08-09

## 2022-08-09 RX ORDER — LANCETS 30 GAUGE
1 EACH MISCELLANEOUS DAILY
Qty: 100 EACH | Refills: 5 | Status: SHIPPED | OUTPATIENT
Start: 2022-08-09

## 2022-08-09 RX ORDER — TRETINOIN 1 MG/G
CREAM TOPICAL
Qty: 45 G | Refills: 1 | Status: SHIPPED | OUTPATIENT
Start: 2022-08-09

## 2022-08-09 NOTE — PATIENT INSTRUCTIONS
Remember to get a flu vaccine in the fall! The flu vaccine typically becomes available in September or October. Another Covid booster should be available next month also.

## 2022-08-09 NOTE — PROGRESS NOTES
LENKA [facility] [address], [facility] [address]                        Telephone [phone]             Fax [phone]       Matthew Edwards  :  1945  Age:  78 y.o. MRN:  [id number]  Date of visit:  2022       Assessment and Plan:    1. Hypothyroidism, unspecified type  TSH was within normal limits on her recent blood work. She is not currently taking any thyroid medication. 2. Elevated glucose  Her fasting glucose and HgbA1c are both within normal limits on her recent lab work. I recommended that she avoid highly processed carbohydrates, begin a regular exercise program, and attempt to lose weight. She was given a prescription for a glucometer and lancets:   - glucose monitoring (FREESTYLE FREEDOM) kit; 1 kit by Does not apply route daily  Dispense: 1 kit; Refill: 0  - Lancets MISC; 1 each by Does not apply route daily  Dispense: 100 each; Refill: 5    - Hemoglobin A1C; Future was ordered to be done prior to her return visit in 6 months. 3. Vitamin D deficiency  Her 25-hydroxy Vitamin D level was at the low end of the normal range (31.4 ng/mL) on  her recent lab work. She was advised to continue with her current dose of Vitamin D. absorption.       - Vitamin D 25 Hydroxy; Future was ordered to be done prior to her return visit in 6 months. 4. Acne vulgaris  Retina-A was refilled:  - tretinoin (RETIN-A) 0.1 % cream; Apply topically nightly. Dispense: 45 g; Refill: 1    5. Routine health maintenance  Health maintenance was reviewed with the patient. She has received four doses of the Pfizer Covid-19 vaccine. She was advised that an additional dose of a Covid vaccine is recommended this fall. Annual influenza vaccine was recommended. All other health maintenance, including Shingrix, Tdap, Pneumovax, and Prevnar-13, is up to date at this time.         Follow up instructions were given to the patient:  Return in about 6 months (around 2/9/2023) for elevated glucose. Subjective:    Sixto Montgomery is a 78 y.o. female who presents to [place] [address] today (8/9/2022) for follow up/evaluation of: Annual Exam and Other (Elevated fasting glucose, Vit D Deficiency)      She states that she has been feeling well. She is tolerating her medications well. She continues to have vision issues. She has not yet scheduled an appointment with ophthalmology. She has no new concerns today. She has received four doses of the Pfizer Covid-19 vaccine. Immunization history was reviewed:  Immunization History   Administered Date(s) Administered    COVID-19, PFIZER GRAY top, DO NOT Dilute, (age 15 y+), IM, 30 mcg/0.3 mL 06/16/2022    COVID-19, PFIZER PURPLE top, DILUTE for use, (age 15 y+), 30mcg/0.3mL 03/17/2021, 04/14/2021, 12/08/2021    Influenza Vaccine, unspecified formulation 10/02/2017    Influenza, High Dose (Fluzone 65 yrs and older) 11/28/2018, 11/01/2019    Influenza, Quadv, adjuvanted, 65 yrs +, IM, PF (Fluad) 10/05/2020, 12/08/2021    PPD Test 04/30/2021, 12/08/2021, 06/14/2022    Pneumococcal Conjugate 13-valent ([id number]) 01/29/2019    Pneumococcal Polysaccharide ([id number]) 07/12/2004, 09/18/2007, 08/22/2013    Td, unspecified formulation 05/04/2018    Tdap (Boostrix, Adacel) 09/03/2012, 11/28/2018    Zoster Recombinant (Shingrix) 03/01/2019, 05/15/2019          She has the following problem list:  Patient Active Problem List   Diagnosis    Vitamin D deficiency    Overweight (BMI 25.0-29. 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 Obese elderly female, healthy-appearing, alert, cooperative, and in no acute distress. Neck supple. No adenopathy. Thyroid symmetric, normal size. Chest:  Normal expansion. Clear to auscultation. No rales, rhonchi, or wheezing. Heart sounds are normal.  Regular rate and rhythm without murmur, gallop or rub. Lower extremities have no edema. Results of labs done 8/8/2022 were reviewed with the patient:   Hospital Outpatient Visit on 08/08/2022   Component Date Value Ref Range Status    Vit D, 25-Hydroxy 08/08/2022 31.4  >29.9 ng/mL Final    Comment:    Reference Range:  Vitamin D status         Range   Deficiency              <20 ng/mL   Mild Deficiency       20-30 ng/mL   Sufficiency           ng/mL   Toxicity               >100 ng/mL      TSH 08/08/2022 2.73  0.30 - 5.00 uIU/mL Final    Hemoglobin A1C 08/08/2022 5.3  4.0 - 6.0 % Final    Estimated Avg Glucose 08/08/2022 105  mg/dL Final    Comment: The ADA and AACC recommend providing the estimated average glucose result to permit better   patient understanding of their HBA1c result. Cholesterol 08/08/2022 99  <200 mg/dL Final    Comment:    Cholesterol Guidelines:      <200  Desirable   200-240  Borderline      >240  Undesirable         HDL 08/08/2022 49  >40 mg/dL Final    Comment:    HDL Guidelines:    <40     Undesirable   40-59    Borderline    >59     Desirable         LDL Cholesterol 08/08/2022 42  0 - 130 mg/dL Final    Comment:    LDL Guidelines:     <100    Desirable   100-129   Near to/above Desirable   130-159   Borderline      >159   Undesirable     Direct (measured) LDL and calculated LDL are not interchangeable tests. Chol/HDL Ratio 08/08/2022 2.0  <5 Final            Triglycerides 08/08/2022 38  <150 mg/dL Final    Comment:    Triglyceride Guidelines:     <150   Desirable   150-199  Borderline   200-499  High     >499   Very high   Based on AHA Guidelines for fasting triglyceride, October 2012. Glucose 08/08/2022 86  70 - 99 mg/dL Final    BUN 08/08/2022 20  8 - 23 mg/dL Final    Creatinine 08/08/2022 0.77  0.50 - 0.90 mg/dL Final    Bun/Cre Ratio 08/08/2022 26 (A) 9 - 20 Final    Calcium 08/08/2022 9.1  8.6 - 10.4 mg/dL Final    Sodium 08/08/2022 139  135 - 144 mmol/L Final    Potassium 08/08/2022 4.3  3.7 - 5.3 mmol/L Final    Chloride 08/08/2022 102  98 - 107 mmol/L Final    CO2 08/08/2022 29  20 - 31 mmol/L Final    Anion Gap 08/08/2022 8 (A) 9 - 17 mmol/L Final    Alkaline Phosphatase 08/08/2022 78  35 - 104 U/L Final    ALT 08/08/2022 13  5 - 33 U/L Final    AST 08/08/2022 18  <32 U/L Final    Total Bilirubin 08/08/2022 0.29 (A) 0.3 - 1.2 mg/dL Final    Total Protein 08/08/2022 7.1  6.4 - 8.3 g/dL Final    Albumin 08/08/2022 3.8  3.5 - 5.2 g/dL Final    Albumin/Globulin Ratio 08/08/2022 1.2  1.0 - 2.5 Final    GFR Non- 08/08/2022 >60  >60 mL/min Final    GFR  08/08/2022 >60  >60 mL/min Final    GFR Comment 08/08/2022        Final    Comment: Average GFR for 79or more years old:   76 mL/min/1.73sq m  Chronic Kidney Disease:   <60 mL/min/1.73sq m  Kidney failure:   <15 mL/min/1.73sq m              eGFR calculated using average adult body mass.  Additional eGFR calculator available at:        [employer]                        (Please note that portions of this note were completed with a voice-recognition program. Efforts were made to edit the dictation but occasionally words are mis-transcribed.)

## 2022-08-10 DIAGNOSIS — R73.9 HYPERGLYCEMIA: Primary | ICD-10-CM

## 2022-08-10 RX ORDER — GLUCOSAMINE HCL/CHONDROITIN SU 500-400 MG
CAPSULE ORAL
Qty: 100 STRIP | Refills: 1 | Status: SHIPPED | OUTPATIENT
Start: 2022-08-10

## 2022-08-10 NOTE — PROGRESS NOTES
Christina called. Medicare requires 2 separate orders for Blood Glucose Monitor and Test strips. See Pended test strip order.

## 2022-12-20 DIAGNOSIS — M85.80 OSTEOPENIA, UNSPECIFIED LOCATION: ICD-10-CM

## 2022-12-20 RX ORDER — IBANDRONATE SODIUM 150 MG/1
150 TABLET, FILM COATED ORAL
Qty: 3 TABLET | Refills: 3 | Status: SHIPPED | OUTPATIENT
Start: 2022-12-20

## 2022-12-20 NOTE — TELEPHONE ENCOUNTER
Hector Jackson called requesting a refill of the below medication which has been pended for you:     Requested Prescriptions     Pending Prescriptions Disp Refills    ibandronate (BONIVA) 150 MG tablet 3 tablet 3     Sig: Take 1 tablet by mouth every 30 days Take one (1) tablet once per month in the morning with a full glass of water, on an empty stomach, and do not take anything else by mouth or lie down for the next 60 minutes.        Last Appointment Date: 8/9/2022  Next Appointment Date: 2/9/2023    Allergies   Allergen Reactions    Asa [Aspirin]      Nausea and Vomiting    Celexa [Citalopram Hydrobromide]      dizziness    Elemental Sulfur Hives    Penicillins Nausea Only    Sulfa Antibiotics     Wasp Venom Other (See Comments)     Swelling, rash, itching, peeling/dermatitis

## 2023-02-20 DIAGNOSIS — R73.9 HYPERGLYCEMIA: ICD-10-CM

## 2023-02-21 RX ORDER — BLOOD SUGAR DIAGNOSTIC
STRIP MISCELLANEOUS
Qty: 100 STRIP | Refills: 1 | Status: SHIPPED | OUTPATIENT
Start: 2023-02-21

## 2024-11-08 NOTE — TELEPHONE ENCOUNTER
Last ov 05/04/2021,no future appointment scheduled.  Patient was notified that she needs to make an appointment,see telephone encounter 10/20/2021 Statement Selected

## (undated) DEVICE — SOLUTION IRRIGATION BAL SALT SOLUTION 15 ML STRL BSS

## (undated) DEVICE — SINGLE USE MEDICAL DEVICE FOR OPHTHALMIC SURGERY: Brand: SIL. COATED I/A 45 MIL 12/B

## (undated) DEVICE — GLOVE SURG SZ 65 THK91MIL LTX FREE SYN POLYISOPRENE

## (undated) DEVICE — 0.9 MM 45° KELMAN® MINI-FLARED ABS® TIP: Brand: ALCON, INFINITI, INTREPID

## (undated) DEVICE — Z DUPLICATE USE 2527422 TUBING O2 STD 7 FT EXTN NO CRUSH VISUAL CNTRST LF

## (undated) DEVICE — AGENT VISCOELASTIC 085ML NONPYROGENIC SOD HYALURONATE

## (undated) DEVICE — KNIFE OPHTHLMC 17.6X2.03X1.09MM 11MM ANGLD SDPRT LSRDGE ARRO

## (undated) DEVICE — KNIFE OPHTH SLT 45 DEG 2.4 MM ANGLED BVL UP SS NS XSTAR DISP

## (undated) DEVICE — TUBING IV STOPCOCK 48 CM 3 W

## (undated) DEVICE — CANNULA HYDRDISECT CHANG 90 DEG 27 GAX16 MM STRL DISP

## (undated) DEVICE — SHIELD EYE W3XL2.5IN UNIV CLR PLAS LTWT

## (undated) DEVICE — SET ADMIN 25ML L117IN PMP MOD CK VLV RLER CLMP 2 SMRTSITE

## (undated) DEVICE — CATHETER ETER IV 22GA L1IN POLYUR STR RADPQ INTROCAN SFTY

## (undated) DEVICE — GLOVE ORANGE PI 7   MSG9070

## (undated) DEVICE — SOLUTION IV 1000ML 0.45% SOD CHL PH 5 INJ USP VIAFLX PLAS

## (undated) DEVICE — KIT INF CTRL 2OZ LUB TBNG L12FT DBL END BRSH SYR OP4

## (undated) DEVICE — SET LNR RED GRN W/ BASE CLEANASCOPE

## (undated) DEVICE — AMERICAN ESOPHAGEAL DILATOR CLEANING BRUSH

## (undated) DEVICE — VISCOAT OPTH SOLN 0.5ML VISCOELASTIC

## (undated) DEVICE — SURGICAL PROCEDURE PACK EYE

## (undated) DEVICE — SOLUTION IRRIGATION BAL SALT SOLUTION 500 ML BTL 6/CA BSS +

## (undated) DEVICE — FORCEP RAD JAW W/NEEDLE 160CM

## (undated) DEVICE — SOLUTION IV IRRIG WATER 500ML POUR BRL ST 2F7113

## (undated) DEVICE — CONMED SCOPE SAVER BITE BLOCK, 20X27 MM: Brand: SCOPE SAVER

## (undated) DEVICE — GLOVE SURG SZ 6 THK91MIL LTX FREE SYN POLYISOPRENE ANTI

## (undated) DEVICE — GLOVE ORANGE PI 7 1/2   MSG9075